# Patient Record
Sex: MALE | Race: WHITE | NOT HISPANIC OR LATINO | Employment: UNEMPLOYED | URBAN - NONMETROPOLITAN AREA
[De-identification: names, ages, dates, MRNs, and addresses within clinical notes are randomized per-mention and may not be internally consistent; named-entity substitution may affect disease eponyms.]

---

## 2020-10-01 ENCOUNTER — TRANSCRIBE ORDERS (OUTPATIENT)
Dept: LAB | Facility: HOSPITAL | Age: 50
End: 2020-10-01

## 2020-10-01 ENCOUNTER — HOSPITAL ENCOUNTER (OUTPATIENT)
Dept: GENERAL RADIOLOGY | Facility: HOSPITAL | Age: 50
Discharge: HOME | End: 2020-10-01
Admitting: FAMILY MEDICINE

## 2020-10-01 DIAGNOSIS — M1A.00X0 IDIOPATHIC CHRONIC GOUT WITHOUT TOPHUS, UNSPECIFIED SITE: ICD-10-CM

## 2020-10-01 DIAGNOSIS — M1A.9XX0 CHRONIC GOUT WITHOUT TOPHUS, UNSPECIFIED CAUSE, UNSPECIFIED SITE: ICD-10-CM

## 2020-10-01 DIAGNOSIS — M25.562 LEFT KNEE PAIN, UNSPECIFIED CHRONICITY: Primary | ICD-10-CM

## 2020-10-01 DIAGNOSIS — M25.562 LEFT KNEE PAIN, UNSPECIFIED CHRONICITY: ICD-10-CM

## 2020-10-01 PROCEDURE — 73562 X-RAY EXAM OF KNEE 3: CPT | Performed by: RADIOLOGY

## 2020-10-01 PROCEDURE — 73562 X-RAY EXAM OF KNEE 3: CPT

## 2022-10-21 ENCOUNTER — HOSPITAL ENCOUNTER (OUTPATIENT)
Dept: GENERAL RADIOLOGY | Facility: HOSPITAL | Age: 52
Discharge: HOME | End: 2022-10-21
Admitting: NURSE PRACTITIONER

## 2022-10-21 DIAGNOSIS — M25.511 RIGHT SHOULDER PAIN: ICD-10-CM

## 2022-10-21 PROCEDURE — 73030 X-RAY EXAM OF SHOULDER: CPT

## 2022-10-21 PROCEDURE — 73030 X-RAY EXAM OF SHOULDER: CPT | Performed by: RADIOLOGY

## 2023-09-05 ENCOUNTER — APPOINTMENT (OUTPATIENT)
Dept: CT IMAGING | Facility: HOSPITAL | Age: 53
DRG: 439 | End: 2023-09-05
Payer: COMMERCIAL

## 2023-09-05 ENCOUNTER — APPOINTMENT (OUTPATIENT)
Dept: ULTRASOUND IMAGING | Facility: HOSPITAL | Age: 53
DRG: 439 | End: 2023-09-05
Payer: COMMERCIAL

## 2023-09-05 ENCOUNTER — HOSPITAL ENCOUNTER (INPATIENT)
Facility: HOSPITAL | Age: 53
LOS: 3 days | Discharge: HOME | DRG: 439 | End: 2023-09-08
Attending: STUDENT IN AN ORGANIZED HEALTH CARE EDUCATION/TRAINING PROGRAM | Admitting: STUDENT IN AN ORGANIZED HEALTH CARE EDUCATION/TRAINING PROGRAM
Payer: COMMERCIAL

## 2023-09-05 ENCOUNTER — APPOINTMENT (OUTPATIENT)
Dept: MRI IMAGING | Facility: HOSPITAL | Age: 53
DRG: 439 | End: 2023-09-05
Payer: COMMERCIAL

## 2023-09-05 DIAGNOSIS — E78.1 HYPERTRIGLYCERIDEMIA: ICD-10-CM

## 2023-09-05 DIAGNOSIS — K85.00 IDIOPATHIC ACUTE PANCREATITIS WITHOUT INFECTION OR NECROSIS: Primary | ICD-10-CM

## 2023-09-05 PROBLEM — K85.90 PANCREATITIS: Status: ACTIVE | Noted: 2023-09-05

## 2023-09-05 LAB
ALBUMIN SERPL-MCNC: 0.4 G/DL (ref 3.5–5.2)
ALBUMIN/GLOB SERPL: 0.1 G/DL
ALP SERPL-CCNC: 75 U/L (ref 39–117)
ALT SERPL W P-5'-P-CCNC: 16 U/L (ref 1–41)
ANION GAP SERPL CALCULATED.3IONS-SCNC: 14.5 MMOL/L (ref 5–15)
ANION GAP SERPL CALCULATED.3IONS-SCNC: 20 MMOL/L (ref 5–15)
AST SERPL-CCNC: 28 U/L (ref 1–40)
BASOPHILS # BLD AUTO: 0.06 10*3/MM3 (ref 0–0.2)
BASOPHILS NFR BLD AUTO: 0.5 % (ref 0–1.5)
BILIRUB SERPL-MCNC: 0.2 MG/DL (ref 0–1.2)
BILIRUB UR QL STRIP: NEGATIVE
BUN SERPL-MCNC: 13 MG/DL (ref 6–20)
BUN SERPL-MCNC: 16 MG/DL (ref 6–20)
BUN/CREAT SERPL: 13.8 (ref 7–25)
BUN/CREAT SERPL: 20.3 (ref 7–25)
CALCIUM SPEC-SCNC: 9.3 MG/DL (ref 8.6–10.5)
CALCIUM SPEC-SCNC: ABNORMAL MMOL/L
CHLORIDE SERPL-SCNC: 100 MMOL/L (ref 98–107)
CHLORIDE SERPL-SCNC: 94 MMOL/L (ref 98–107)
CLARITY UR: CLEAR
CO2 SERPL-SCNC: 12 MMOL/L (ref 22–29)
CO2 SERPL-SCNC: 17.5 MMOL/L (ref 22–29)
COLOR UR: YELLOW
CREAT SERPL-MCNC: 0.64 MG/DL (ref 0.76–1.27)
CREAT SERPL-MCNC: 1.16 MG/DL (ref 0.76–1.27)
D-LACTATE SERPL-SCNC: 1.5 MMOL/L (ref 0.5–2)
DEPRECATED RDW RBC AUTO: 41.3 FL (ref 37–54)
EGFRCR SERPLBLD CKD-EPI 2021: 112.5 ML/MIN/1.73
EGFRCR SERPLBLD CKD-EPI 2021: 74.8 ML/MIN/1.73
EOSINOPHIL # BLD AUTO: 0.26 10*3/MM3 (ref 0–0.4)
EOSINOPHIL NFR BLD AUTO: 2.2 % (ref 0.3–6.2)
ERYTHROCYTE [DISTWIDTH] IN BLOOD BY AUTOMATED COUNT: 12.2 % (ref 12.3–15.4)
GLOBULIN UR ELPH-MCNC: 5.9 GM/DL
GLUCOSE BLDC GLUCOMTR-MCNC: 135 MG/DL (ref 70–130)
GLUCOSE BLDC GLUCOMTR-MCNC: 138 MG/DL (ref 70–130)
GLUCOSE BLDC GLUCOMTR-MCNC: 146 MG/DL (ref 70–130)
GLUCOSE BLDC GLUCOMTR-MCNC: 150 MG/DL (ref 70–130)
GLUCOSE BLDC GLUCOMTR-MCNC: 151 MG/DL (ref 70–130)
GLUCOSE BLDC GLUCOMTR-MCNC: 158 MG/DL (ref 70–130)
GLUCOSE SERPL-MCNC: 149 MG/DL (ref 65–99)
GLUCOSE SERPL-MCNC: 238 MG/DL (ref 65–99)
GLUCOSE UR STRIP-MCNC: ABNORMAL MG/DL
HCT VFR BLD AUTO: 47.2 % (ref 37.5–51)
HGB BLD-MCNC: 19.4 G/DL (ref 13–17.7)
HGB UR QL STRIP.AUTO: NEGATIVE
HOLD SPECIMEN: NORMAL
HOLD SPECIMEN: NORMAL
IMM GRANULOCYTES # BLD AUTO: 0.1 10*3/MM3 (ref 0–0.05)
IMM GRANULOCYTES NFR BLD AUTO: 0.9 % (ref 0–0.5)
KETONES UR QL STRIP: ABNORMAL
LEUKOCYTE ESTERASE UR QL STRIP.AUTO: NEGATIVE
LIPASE SERPL-CCNC: >3000 U/L (ref 13–60)
LYMPHOCYTES # BLD AUTO: 1.33 10*3/MM3 (ref 0.7–3.1)
LYMPHOCYTES NFR BLD AUTO: 11.4 % (ref 19.6–45.3)
MAGNESIUM SERPL-MCNC: 2.4 MG/DL (ref 1.6–2.6)
MCH RBC QN AUTO: 37.7 PG (ref 26.6–33)
MCHC RBC AUTO-ENTMCNC: 36.9 G/DL (ref 31.5–35.7)
MCV RBC AUTO: 91.7 FL (ref 79–97)
MONOCYTES # BLD AUTO: 0.88 10*3/MM3 (ref 0.1–0.9)
MONOCYTES NFR BLD AUTO: 7.6 % (ref 5–12)
NEUTROPHILS NFR BLD AUTO: 77.4 % (ref 42.7–76)
NEUTROPHILS NFR BLD AUTO: 8.99 10*3/MM3 (ref 1.7–7)
NITRITE UR QL STRIP: NEGATIVE
NRBC BLD AUTO-RTO: 0 /100 WBC (ref 0–0.2)
PH UR STRIP.AUTO: <=5 [PH] (ref 5–8)
PHOSPHATE SERPL-MCNC: 2.6 MG/DL (ref 2.5–4.5)
PLATELET # BLD AUTO: 306 10*3/MM3 (ref 140–450)
PMV BLD AUTO: 10.6 FL (ref 6–12)
POTASSIUM SERPL-SCNC: 4.6 MMOL/L (ref 3.5–5.2)
POTASSIUM SERPL-SCNC: 5.8 MMOL/L (ref 3.5–5.2)
PROT SERPL-MCNC: 6.3 G/DL (ref 6–8.5)
PROT UR QL STRIP: ABNORMAL
RBC # BLD AUTO: 5.15 10*6/MM3 (ref 4.14–5.8)
SODIUM SERPL-SCNC: 126 MMOL/L (ref 136–145)
SODIUM SERPL-SCNC: 132 MMOL/L (ref 136–145)
SP GR UR STRIP: >1.03 (ref 1–1.03)
TRIGL SERPL-MCNC: >4425 MG/DL (ref 0–150)
UROBILINOGEN UR QL STRIP: ABNORMAL
WBC NRBC COR # BLD: 11.62 10*3/MM3 (ref 3.4–10.8)
WHOLE BLOOD HOLD COAG: NORMAL
WHOLE BLOOD HOLD SPECIMEN: NORMAL

## 2023-09-05 PROCEDURE — 25010000002 ENOXAPARIN PER 10 MG: Performed by: STUDENT IN AN ORGANIZED HEALTH CARE EDUCATION/TRAINING PROGRAM

## 2023-09-05 PROCEDURE — 74181 MRI ABDOMEN W/O CONTRAST: CPT | Performed by: RADIOLOGY

## 2023-09-05 PROCEDURE — 25010000002 ONDANSETRON PER 1 MG: Performed by: STUDENT IN AN ORGANIZED HEALTH CARE EDUCATION/TRAINING PROGRAM

## 2023-09-05 PROCEDURE — 25010000002 HYDROMORPHONE 1 MG/ML SOLUTION: Performed by: STUDENT IN AN ORGANIZED HEALTH CARE EDUCATION/TRAINING PROGRAM

## 2023-09-05 PROCEDURE — 82948 REAGENT STRIP/BLOOD GLUCOSE: CPT

## 2023-09-05 PROCEDURE — 76705 ECHO EXAM OF ABDOMEN: CPT

## 2023-09-05 PROCEDURE — 25010000002 PROCHLORPERAZINE 10 MG/2ML SOLUTION: Performed by: STUDENT IN AN ORGANIZED HEALTH CARE EDUCATION/TRAINING PROGRAM

## 2023-09-05 PROCEDURE — 76705 ECHO EXAM OF ABDOMEN: CPT | Performed by: RADIOLOGY

## 2023-09-05 PROCEDURE — 25510000001 IOPAMIDOL 61 % SOLUTION: Performed by: STUDENT IN AN ORGANIZED HEALTH CARE EDUCATION/TRAINING PROGRAM

## 2023-09-05 PROCEDURE — 85025 COMPLETE CBC W/AUTO DIFF WBC: CPT | Performed by: PHYSICIAN ASSISTANT

## 2023-09-05 PROCEDURE — 84100 ASSAY OF PHOSPHORUS: CPT | Performed by: STUDENT IN AN ORGANIZED HEALTH CARE EDUCATION/TRAINING PROGRAM

## 2023-09-05 PROCEDURE — 74181 MRI ABDOMEN W/O CONTRAST: CPT

## 2023-09-05 PROCEDURE — 83605 ASSAY OF LACTIC ACID: CPT | Performed by: PHYSICIAN ASSISTANT

## 2023-09-05 PROCEDURE — 74177 CT ABD & PELVIS W/CONTRAST: CPT

## 2023-09-05 PROCEDURE — 84478 ASSAY OF TRIGLYCERIDES: CPT | Performed by: STUDENT IN AN ORGANIZED HEALTH CARE EDUCATION/TRAINING PROGRAM

## 2023-09-05 PROCEDURE — 93005 ELECTROCARDIOGRAM TRACING: CPT | Performed by: STUDENT IN AN ORGANIZED HEALTH CARE EDUCATION/TRAINING PROGRAM

## 2023-09-05 PROCEDURE — 99285 EMERGENCY DEPT VISIT HI MDM: CPT

## 2023-09-05 PROCEDURE — 83690 ASSAY OF LIPASE: CPT | Performed by: PHYSICIAN ASSISTANT

## 2023-09-05 PROCEDURE — 80053 COMPREHEN METABOLIC PANEL: CPT | Performed by: PHYSICIAN ASSISTANT

## 2023-09-05 PROCEDURE — 83735 ASSAY OF MAGNESIUM: CPT | Performed by: STUDENT IN AN ORGANIZED HEALTH CARE EDUCATION/TRAINING PROGRAM

## 2023-09-05 PROCEDURE — 74177 CT ABD & PELVIS W/CONTRAST: CPT | Performed by: RADIOLOGY

## 2023-09-05 PROCEDURE — 81003 URINALYSIS AUTO W/O SCOPE: CPT | Performed by: PHYSICIAN ASSISTANT

## 2023-09-05 RX ORDER — GLIPIZIDE 10 MG/1
10 TABLET, FILM COATED, EXTENDED RELEASE ORAL DAILY
COMMUNITY

## 2023-09-05 RX ORDER — ATORVASTATIN CALCIUM 40 MG/1
40 TABLET, FILM COATED ORAL NIGHTLY
Status: CANCELLED | OUTPATIENT
Start: 2023-09-05

## 2023-09-05 RX ORDER — DAPAGLIFLOZIN 10 MG/1
10 TABLET, FILM COATED ORAL DAILY
Status: ON HOLD | COMMUNITY
End: 2023-09-05

## 2023-09-05 RX ORDER — ASPIRIN 81 MG/1
TABLET ORAL
COMMUNITY

## 2023-09-05 RX ORDER — PROCHLORPERAZINE EDISYLATE 5 MG/ML
10 INJECTION INTRAMUSCULAR; INTRAVENOUS ONCE
Status: COMPLETED | OUTPATIENT
Start: 2023-09-05 | End: 2023-09-05

## 2023-09-05 RX ORDER — DEXTROSE MONOHYDRATE 25 G/50ML
25 INJECTION, SOLUTION INTRAVENOUS
Status: DISCONTINUED | OUTPATIENT
Start: 2023-09-05 | End: 2023-09-08

## 2023-09-05 RX ORDER — SODIUM CHLORIDE 0.9 % (FLUSH) 0.9 %
10 SYRINGE (ML) INJECTION AS NEEDED
Status: DISCONTINUED | OUTPATIENT
Start: 2023-09-05 | End: 2023-09-08 | Stop reason: HOSPADM

## 2023-09-05 RX ORDER — DEXTROSE AND SODIUM CHLORIDE 5; .45 G/100ML; G/100ML
150 INJECTION, SOLUTION INTRAVENOUS CONTINUOUS
Status: DISCONTINUED | OUTPATIENT
Start: 2023-09-05 | End: 2023-09-08

## 2023-09-05 RX ORDER — NICOTINE POLACRILEX 4 MG
15 LOZENGE BUCCAL
Status: DISCONTINUED | OUTPATIENT
Start: 2023-09-05 | End: 2023-09-08

## 2023-09-05 RX ORDER — GLUCAGON 1 MG/ML
1 KIT INJECTION
Status: DISCONTINUED | OUTPATIENT
Start: 2023-09-05 | End: 2023-09-08

## 2023-09-05 RX ORDER — HYDROXYZINE 50 MG/1
50 TABLET, FILM COATED ORAL NIGHTLY
Status: CANCELLED | OUTPATIENT
Start: 2023-09-05

## 2023-09-05 RX ORDER — ASPIRIN 81 MG/1
81 TABLET ORAL DAILY
Status: CANCELLED | OUTPATIENT
Start: 2023-09-06

## 2023-09-05 RX ORDER — GLIPIZIDE 5 MG/1
5 TABLET ORAL
Status: CANCELLED | OUTPATIENT
Start: 2023-09-06

## 2023-09-05 RX ORDER — FENOFIBRATE 145 MG/1
145 TABLET, COATED ORAL NIGHTLY
COMMUNITY

## 2023-09-05 RX ORDER — ENOXAPARIN SODIUM 100 MG/ML
40 INJECTION SUBCUTANEOUS NIGHTLY
Status: DISCONTINUED | OUTPATIENT
Start: 2023-09-05 | End: 2023-09-08 | Stop reason: HOSPADM

## 2023-09-05 RX ORDER — FENOFIBRATE 145 MG/1
145 TABLET, COATED ORAL NIGHTLY
Status: CANCELLED | OUTPATIENT
Start: 2023-09-05

## 2023-09-05 RX ORDER — HYDROXYZINE PAMOATE 25 MG/1
50 CAPSULE ORAL NIGHTLY
COMMUNITY

## 2023-09-05 RX ORDER — PROCHLORPERAZINE EDISYLATE 5 MG/ML
5 INJECTION INTRAMUSCULAR; INTRAVENOUS EVERY 6 HOURS PRN
Status: DISCONTINUED | OUTPATIENT
Start: 2023-09-05 | End: 2023-09-08 | Stop reason: HOSPADM

## 2023-09-05 RX ORDER — HYDROMORPHONE HYDROCHLORIDE 1 MG/ML
0.5 INJECTION, SOLUTION INTRAMUSCULAR; INTRAVENOUS; SUBCUTANEOUS
Status: DISCONTINUED | OUTPATIENT
Start: 2023-09-05 | End: 2023-09-08 | Stop reason: HOSPADM

## 2023-09-05 RX ORDER — FLUOXETINE HYDROCHLORIDE 20 MG/1
20 CAPSULE ORAL DAILY
Status: CANCELLED | OUTPATIENT
Start: 2023-09-06

## 2023-09-05 RX ORDER — ATORVASTATIN CALCIUM 40 MG/1
40 TABLET, FILM COATED ORAL NIGHTLY
COMMUNITY

## 2023-09-05 RX ORDER — SODIUM CHLORIDE 9 MG/ML
40 INJECTION, SOLUTION INTRAVENOUS AS NEEDED
Status: DISCONTINUED | OUTPATIENT
Start: 2023-09-05 | End: 2023-09-08 | Stop reason: HOSPADM

## 2023-09-05 RX ORDER — ONDANSETRON 2 MG/ML
4 INJECTION INTRAMUSCULAR; INTRAVENOUS ONCE
Status: COMPLETED | OUTPATIENT
Start: 2023-09-05 | End: 2023-09-05

## 2023-09-05 RX ORDER — SODIUM CHLORIDE 0.9 % (FLUSH) 0.9 %
10 SYRINGE (ML) INJECTION EVERY 12 HOURS SCHEDULED
Status: DISCONTINUED | OUTPATIENT
Start: 2023-09-05 | End: 2023-09-08 | Stop reason: HOSPADM

## 2023-09-05 RX ORDER — FLUOXETINE HYDROCHLORIDE 20 MG/1
20 CAPSULE ORAL DAILY
COMMUNITY

## 2023-09-05 RX ADMIN — HYDROMORPHONE HYDROCHLORIDE 1 MG: 1 INJECTION, SOLUTION INTRAMUSCULAR; INTRAVENOUS; SUBCUTANEOUS at 15:12

## 2023-09-05 RX ADMIN — HYDROMORPHONE HYDROCHLORIDE 1 MG: 1 INJECTION, SOLUTION INTRAMUSCULAR; INTRAVENOUS; SUBCUTANEOUS at 18:36

## 2023-09-05 RX ADMIN — INSULIN HUMAN 0.05 UNITS/KG/HR: 1 INJECTION, SOLUTION INTRAVENOUS at 21:42

## 2023-09-05 RX ADMIN — ONDANSETRON 4 MG: 2 INJECTION INTRAMUSCULAR; INTRAVENOUS at 14:02

## 2023-09-05 RX ADMIN — DEXTROSE AND SODIUM CHLORIDE 150 ML/HR: 5; 450 INJECTION, SOLUTION INTRAVENOUS at 20:46

## 2023-09-05 RX ADMIN — IOPAMIDOL 86 ML: 612 INJECTION, SOLUTION INTRAVENOUS at 15:30

## 2023-09-05 RX ADMIN — ENOXAPARIN SODIUM 40 MG: 40 INJECTION SUBCUTANEOUS at 20:50

## 2023-09-05 RX ADMIN — Medication 10 ML: at 20:51

## 2023-09-05 RX ADMIN — SODIUM CHLORIDE 1000 ML: 9 INJECTION, SOLUTION INTRAVENOUS at 13:41

## 2023-09-05 RX ADMIN — PROCHLORPERAZINE EDISYLATE 10 MG: 5 INJECTION INTRAMUSCULAR; INTRAVENOUS at 17:40

## 2023-09-05 RX ADMIN — SODIUM CHLORIDE 1000 ML: 9 INJECTION, SOLUTION INTRAVENOUS at 15:06

## 2023-09-05 RX ADMIN — HYDROMORPHONE HYDROCHLORIDE 1 MG: 1 INJECTION, SOLUTION INTRAMUSCULAR; INTRAVENOUS; SUBCUTANEOUS at 17:40

## 2023-09-05 RX ADMIN — HYDROMORPHONE HYDROCHLORIDE 1 MG: 1 INJECTION, SOLUTION INTRAMUSCULAR; INTRAVENOUS; SUBCUTANEOUS at 14:02

## 2023-09-05 RX ADMIN — PROCHLORPERAZINE EDISYLATE 5 MG: 5 INJECTION INTRAMUSCULAR; INTRAVENOUS at 22:37

## 2023-09-05 NOTE — LETTER
September 8, 2023     Patient: Herminio Matson   YOB: 1970   Date of Visit: 9/5/2023       To Whom It May Concern:    It is my medical opinion that Herminio Matson should be excused from work 9/5/2023 through 9/10/2023 due to recent hospitalization.            Sincerely,        Dr. Deangelo Thomason, DO

## 2023-09-05 NOTE — ED PROVIDER NOTES
Subjective   History of Present Illness  This is a 52-year-old male who presents to the emergency department chief complaint epigastric abdominal pain that radiates to his back.  Patient has had 1 episode of associated nausea and vomiting.  Patient has history of pancreatitis from hypertriglyceridemia.    History provided by:  Patient   used: No    Abdominal Pain  Pain location:  Generalized  Pain quality: aching    Pain radiates to:  Does not radiate  Pain severity:  Moderate  Onset quality:  Gradual  Duration:  2 days  Timing:  Intermittent  Progression:  Worsening  Chronicity:  New  Context: not alcohol use, not awakening from sleep, not diet changes, not laxative use, not previous surgeries, not recent illness, not recent sexual activity, not retching, not sick contacts and not suspicious food intake    Relieved by:  Nothing  Worsened by:  Nothing  Ineffective treatments:  None tried  Associated symptoms: chills, fatigue and nausea    Associated symptoms: no cough, no shortness of breath, no vaginal bleeding, no vaginal discharge and no vomiting    Risk factors: no alcohol abuse, no aspirin use, has not had multiple surgeries, no NSAID use, not pregnant and no recent hospitalization      Review of Systems   Constitutional:  Positive for chills and fatigue.   Eyes: Negative.  Negative for photophobia, pain, redness and itching.   Respiratory:  Negative for cough, choking, shortness of breath and stridor.    Gastrointestinal:  Positive for abdominal pain and nausea. Negative for vomiting.   Endocrine: Negative.    Genitourinary: Negative.  Negative for enuresis, flank pain, frequency, penile discharge, penile pain, vaginal bleeding and vaginal discharge.   All other systems reviewed and are negative.    Past Medical History:   Diagnosis Date    Anxiety     Hypertension     Pancreatitis        No Known Allergies    History reviewed. No pertinent surgical history.    History reviewed. No  pertinent family history.    Social History     Socioeconomic History    Marital status:            Objective   Physical Exam  Vitals and nursing note reviewed.   Constitutional:       General: He is not in acute distress.     Appearance: Normal appearance. He is well-developed and normal weight. He is not ill-appearing or toxic-appearing.   HENT:      Head: Normocephalic and atraumatic.      Nose: Nose normal.      Mouth/Throat:      Mouth: Mucous membranes are moist.      Pharynx: Oropharynx is clear. No pharyngeal swelling.   Eyes:      General: No scleral icterus.     Extraocular Movements: Extraocular movements intact.      Conjunctiva/sclera: Conjunctivae normal.      Pupils: Pupils are equal, round, and reactive to light.   Cardiovascular:      Rate and Rhythm: Normal rate and regular rhythm.      Heart sounds: Normal heart sounds. No murmur heard.    No friction rub. No gallop.   Pulmonary:      Effort: Pulmonary effort is normal. No respiratory distress.      Breath sounds: Normal breath sounds. No stridor. No wheezing, rhonchi or rales.   Abdominal:      General: Abdomen is flat and protuberant. Bowel sounds are normal.      Palpations: Abdomen is soft. There is no shifting dullness, fluid wave, hepatomegaly, splenomegaly, mass or pulsatile mass.      Tenderness: There is abdominal tenderness in the epigastric area. There is guarding.      Hernia: There is no hernia in the umbilical area, ventral area, left inguinal area, right femoral area or right inguinal area.   Musculoskeletal:         General: Normal range of motion.      Cervical back: Normal range of motion and neck supple.   Skin:     General: Skin is warm and dry.      Capillary Refill: Capillary refill takes less than 2 seconds.      Coloration: Skin is not cyanotic, jaundiced, mottled or pale.      Findings: No erythema.   Neurological:      General: No focal deficit present.      Mental Status: He is alert and oriented to person, place,  and time.   Psychiatric:         Mood and Affect: Mood normal. Mood is not anxious or depressed.         Behavior: Behavior normal.         Thought Content: Thought content normal.         Judgment: Judgment normal.       Procedures           ED Course  ED Course as of 09/05/23 1910   Tue Sep 05, 2023   1556 IMPRESSION:  1.  Stranding around the pancreas suggestive of acute pancreatitis.  2.  Tiny calcific density measuring about 2 mm in region of head of  pancreas could represent distal duct stone and evaluation with  ultrasound or MRCP could be considered for further evaluation.  3.  Degenerative changes lumbar spine as described.   []   1706 Discussed care with Dr. Zaldivar.  Await MRCP. []      ED Course User Index  [] Chun Fernandez PA-C                                           Medical Decision Making  Problems Addressed:  Idiopathic acute pancreatitis without infection or necrosis: complicated acute illness or injury    Amount and/or Complexity of Data Reviewed  Labs: ordered.  Radiology: ordered.    Risk  Prescription drug management.  Decision regarding hospitalization.        Final diagnoses:   Idiopathic acute pancreatitis without infection or necrosis   Hypertriglyceridemia       ED Disposition  ED Disposition       ED Disposition   Decision to Admit    Condition   --    Comment   Level of Care: Progressive Care [20]   Diagnosis: Pancreatitis [202663]   Certification: I Certify That Inpatient Hospital Services Are Medically Necessary For Greater Than 2 Midnights                 No follow-up provider specified.       Medication List      No changes were made to your prescriptions during this visit.            Chun Fernandez PA-C  09/05/23 1910

## 2023-09-05 NOTE — H&P
[facility] ERIST HISTORY AND PHYSICAL    Patient Identification:  Name:  Herminio Matson  Age:  52 y.o.  Sex:  male  :  1970  MRN:  [id number]   Visit Number:  [id number]  Admit Date: 2023   Room number:  H101/H1  Primary Care Physician:  Zain      Subjective     Chief complaint:    Chief Complaint   Patient presents with    Abdominal Pain       History of presenting illness:  52 y.o. male who presents to the hospital with onset of nausea and vomiting with epigastric pain early this morning.  Patient has a past medical history significant for hypertriglyceridemia with prior episode of pancreatitis on fibrate and statin, hypertension, and diabetes mellitus type 2.  Patient states that symptoms began earlier this morning.  He denies any fevers or chills associated with this.  He denies any chest pain, shortness of breath.  Patient reports inability to keep down oral food today hence he came to the hospital for further evaluation.  Patient does report past history of prior hospitalization for pancreatitis secondary to hypertriglyceridemia approximately 3 years ago in [place].  When asked if he is missed or run out of any of his cholesterol/lipid-lowering medications patient does report that up until last week when he followed up with his PCP he had been without his fibrate for several weeks.  When asked if repeat lab work was obtained at his last PCP visit he does report that his triglycerides reported to be in the 6000's he thinks and his wife was unaware of this.  Patient's triglycerides today in the emergency room elevated at 4400.  Patient states that he feels a little bit drowsy/sluggish but otherwise denies any other acute complaints.  No diarrhea or constipation no dysuria.  ---------------------------------------------------------------------------------------------------------------------   Review of Systems a 14 system review of systems was performed  with pertinent positives and negatives as above in the HPI.  ---------------------------------------------------------------------------------------------------------------------   Past Medical History:   Diagnosis Date    Anxiety     Hypertension     Pancreatitis      History reviewed. No pertinent surgical history.  History reviewed. No pertinent family history.  Social History     Socioeconomic History    Marital status:      ---------------------------------------------------------------------------------------------------------------------   Allergies:  Patient has no known allergies.  ---------------------------------------------------------------------------------------------------------------------   Medications below are reported home medications pulling from within the system; at this time, these medications have not been reconciled unless otherwise specified and are in the verification process for further verifcation as current home medications.    Prior to Admission Medications       Prescriptions Last Dose Informant Patient Reported? Taking?    FLUoxetine (PROzac) 10 MG capsule   Yes No    Take 1 capsule by mouth Daily.    hydrOXYzine pamoate (VISTARIL) 25 MG capsule   Yes No    Take 1 capsule by mouth 3 (Three) Times a Day As Needed for Itching.    metoprolol tartrate (LOPRESSOR) 50 MG tablet   Yes No    Take 1 tablet by mouth 2 (Two) Times a Day.          Objective     Vital Signs:  Temp:  [98.1 °F (36.7 °C)-98.7 °F (37.1 °C)] 98.7 °F (37.1 °C)  Heart Rate:  [67-92] 92  Resp:  [18-20] 20  BP: (129-153)/(76-87) 129/77    No data found.  SpO2:  [94 %-98 %] 94 %  on   ;   Device (Oxygen Therapy): room air  Body mass index is 28.48 kg/m².    Wt Readings from Last 3 Encounters:   09/05/23 95.3 kg (210 lb)      ---------------------------------------------------------------------------------------------------------------------   Physical Exam:  Constitutional:  Well-developed and well-nourished.  No  respiratory distress.      HENT:  Head: Normocephalic and atraumatic.  Mouth:  Moist mucous membranes.    Eyes:  Conjunctivae and EOM are normal.  Pupils are equal, round, and reactive to light.  No scleral icterus.  Neck:  Neck supple.  No JVD present.    Cardiovascular:  Normal rate, regular rhythm and normal heart sounds with no murmur.  Pulmonary/Chest:  No respiratory distress, no wheezes, no crackles, with normal breath sounds and good air movement.  Abdominal:  Soft.  Bowel sounds are normal.  No distention but there is tenderness to palpation in the epigastric region..   Musculoskeletal:  No tenderness and no deformity.  No red or swollen joints anywhere.    Neurological: Slightly drowsy but oriented to person, place, and time.  No cranial nerve deficit.  No tongue deviation.  No facial droop.  No slurred speech.   Skin:  Skin is warm and dry.  No rash noted.  No pallor.   Peripheral vascular:  No edema and pulses on all 4 extremities.    ---------------------------------------------------------------------------------------------------------------------  EKG:   Ordered and pending at this time    --------------------------------------------------------------------------------------------------------------------  Labs:  Results from last 7 days   Lab Units 09/05/23  1347   LACTATE mmol/L 1.5   WBC 10*3/mm3 11.62*   HEMOGLOBIN g/dL 19.4*   HEMATOCRIT % 47.2   MCV fL 91.7   MCHC g/dL 36.9*   PLATELETS 10*3/mm3 306         Results from last 7 days   Lab Units 09/05/23  1347   SODIUM mmol/L 126*   POTASSIUM mmol/L 5.8*   CHLORIDE mmol/L 94*   CO2 mmol/L 17.5*   BUN mg/dL 16   CREATININE mg/dL 1.16   GLUCOSE mg/dL 238*   ALBUMIN g/dL 0.4*   BILIRUBIN mg/dL 0.2   ALK PHOS U/L 75   AST (SGOT) U/L 28   ALT (SGPT) U/L 16   Estimated Creatinine Clearance: 87.2 mL/min (by C-G formula based on SCr of 1.16 mg/dL).    No results found for: AMMONIA      Results from last 7 days   Lab Units 09/05/23  1347   TRIGLYCERIDES  mg/dL >4,425*     No results found for: HGBA1C, POCGLU  No results found for: TSH, FREET4  No results found for: PREGTESTUR, PREGSERUM, HCG, HCGQUANT  Pain Management Panel           No data to display              Brief Urine Lab Results  (Last result in the past 365 days)        Color   Clarity   Blood   Leuk Est   Nitrite   Protein   CREAT   Urine HCG        09/05/23 1347 Yellow   Clear   Negative   Negative   Negative   Trace                 No results found for: BLOODCX  No results found for: URINECX  No results found for: WOUNDCX  No results found for: STOOLCX    I have personally looked at the labs and they are summarized above.  ----------------------------------------------------------------------------------------------------------------------  Detailed radiology reports for the last 24 hours:    Imaging Results (Last 24 Hours)       Procedure Component Value Units Date/Time    MRI abdomen wo contrast mrcp [[id number]] Resulted: 09/05/23 1639     Updated: 09/05/23 1704    US Gallbladder [[id number]] Collected: 09/05/23 1629     Updated: 09/05/23 1631    Narrative:      EXAM:    US Abdomen Limited, Gallbladder     EXAM DATE:    9/5/2023 4:16 PM     CLINICAL HISTORY:    ruq abdominal pain     TECHNIQUE:    Real-time ultrasound of the right upper quadrant with image  documentation.     COMPARISON:    No relevant prior studies available.     FINDINGS:    Liver:  Fatty infiltration of the liver.    Gallbladder:  Unremarkable.  No gallstones.    Common bile duct:  Unremarkable as visualized.  No stones.  No  dilation.  Common bile duct measures 0.22 cm in diameter.    Pancreas:  Unremarkable as visualized.       Impression:        No acute findings in the right upper quadrant.     This report was finalized on 9/5/2023 4:29 PM by Dr. Perry Roberts MD.       CT Abdomen Pelvis With Contrast [[id number]] Collected: 09/05/23 1547     Updated: 09/05/23 1550    Narrative:      EXAM:    CT Abdomen and Pelvis With  Intravenous Contrast     EXAM DATE:    9/5/2023 3:13 PM     CLINICAL HISTORY:    abdominal pain     TECHNIQUE:    Axial computed tomography images of the abdomen and pelvis with  intravenous contrast.  Sagittal and coronal reformatted images were  created and reviewed.  This CT exam was performed using one or more of  the following dose reduction techniques:  automated exposure control,  adjustment of the mA and/or kV according to patient size, and/or use of  iterative reconstruction technique.     COMPARISON:    No relevant prior studies available.     FINDINGS:    LUNG BASES:  Unremarkable.  No mass.  No consolidation.      ABDOMEN:    LIVER:  Unremarkable.  No mass.    GALLBLADDER AND BILE DUCTS:  Unremarkable.  No calcified stones.  No  ductal dilation.    PANCREAS:  Stranding around the pancreas suggestive of acute  pancreatitis.  Tiny calcific density measuring about 2 mm in region of  head of pancreas could represent distal duct stone and evaluation with  ultrasound or MRCP could be considered for further evaluation.    SPLEEN:  Unremarkable.  No splenomegaly.    ADRENALS:  Unremarkable.  No mass.    KIDNEYS AND URETERS:  Unremarkable.  No solid mass.  No  hydronephrosis.    STOMACH AND BOWEL:  Unremarkable.  No obstruction.  No mucosal  thickening.      PELVIS:    APPENDIX:  No findings to suggest acute appendicitis.    BLADDER:  Unremarkable.  No mass.    REPRODUCTIVE:  Unremarkable as visualized.      ABDOMEN and PELVIS:    INTRAPERITONEAL SPACE:  Unremarkable.  No free air.  No significant  fluid collection.    BONES/JOINTS:  Degenerative disc disease throughout the lumbar spine.   Degenerative facet arthropathy throughout the lumbar spine, most  prominent in the lower lumbar spine.  No acute fracture.  No  dislocation.    SOFT TISSUES:  Unremarkable.    VASCULATURE:  Atherosclerotic disease.  No abdominal aortic aneurysm.    LYMPH NODES:  Unremarkable.  No enlarged lymph nodes.       Impression:      1.   Stranding around the pancreas suggestive of acute pancreatitis.  2.  Tiny calcific density measuring about 2 mm in region of head of  pancreas could represent distal duct stone and evaluation with  ultrasound or MRCP could be considered for further evaluation.  3.  Degenerative changes lumbar spine as described.     This report was finalized on 9/5/2023 3:48 PM by Dr. Perry Roberts MD.             Final impressions for the last 30 days of radiology reports:    CT Abdomen Pelvis With Contrast    Result Date: 9/5/2023  1.  Stranding around the pancreas suggestive of acute pancreatitis. 2.  Tiny calcific density measuring about 2 mm in region of head of pancreas could represent distal duct stone and evaluation with ultrasound or MRCP could be considered for further evaluation. 3.  Degenerative changes lumbar spine as described.  This report was finalized on 9/5/2023 3:48 PM by Dr. Perry Roberts MD.      US Gallbladder    Result Date: 9/5/2023    No acute findings in the right upper quadrant.  This report was finalized on 9/5/2023 4:29 PM by Dr. Perry Roberts MD.     I have personally looked at the radiology images and read the final radiology report.    Assessment & Plan       Acute pancreatitis  Severe hypertriglyceridemia  Intractable nausea and vomiting    -Patient presenting with nausea and vomiting associated with acute pancreatitis in the setting of markedly elevated triglycerides greater than 4000.  Patient with a past medical history significant for hypertriglyceridemia and previous induced pancreatitis in the past last episode 3 years prior.    -Patient admits that he has been out of his fibrate until a week ago for approximately 2 to 3 weeks large suspicion that this is a contributing factor.    -Initiate patient on D5 half-normal saline for volume expansion in the setting of pancreatitis as well as need for glucose administration while initiating insulin drip for hypertriglyceridemia.    -Triglycerides every  12 hours until triglycerides less than 500 at which point we will turn off insulin drip.    -We will resume any of patient's home medications as appropriate.    -Lipase greater than 3000 with typical abdominal pain and imaging findings consistent with pancreatitis.    Hyponatremia    -Patient with corrected sodium of 129 not clinically significant at this time, will continue to monitor on chemistry panels.    Diabetes mellitus type 2    -Patient currently on insulin drip for treatment of hypertriglyceridemia as above, will monitor glucose closely and adjust insulin drip as needed.    -Every hour glucose checks while on insulin drip.    Hypertension    -Resume home antihypertensives once reconciled    VTE Prophylaxis:   Mechanical Order History:       None          Pharmalogical Order History:        Ordered     Dose Route Frequency Stop    09/05/23 1842  Enoxaparin Sodium (LOVENOX) syringe 40 mg         40 mg SC Nightly --    09/05/23 1840  Pharmacy to Dose enoxaparin (LOVENOX)        Question:  Indication of use  Answer:  Prophylaxis    -- XX Continuous PRN --                    The patient is high risk due to the following diagnoses/reasons: Hypertrophy acidemia induced acute pancreatitis with intractable nausea and vomiting.        Deangelo Thomason DO  [facility] ERist  09/05/23  18:46 EDT

## 2023-09-06 LAB
ANION GAP SERPL CALCULATED.3IONS-SCNC: 10.1 MMOL/L (ref 5–15)
ANION GAP SERPL CALCULATED.3IONS-SCNC: 11.7 MMOL/L (ref 5–15)
ANION GAP SERPL CALCULATED.3IONS-SCNC: 9.6 MMOL/L (ref 5–15)
BUN SERPL-MCNC: 10 MG/DL (ref 6–20)
BUN SERPL-MCNC: 10 MG/DL (ref 6–20)
BUN SERPL-MCNC: 11 MG/DL (ref 6–20)
BUN/CREAT SERPL: 13.5 (ref 7–25)
BUN/CREAT SERPL: 17.5 (ref 7–25)
BUN/CREAT SERPL: 18.3 (ref 7–25)
CALCIUM SPEC-SCNC: 8.4 MG/DL (ref 8.6–10.5)
CALCIUM SPEC-SCNC: 8.8 MG/DL (ref 8.6–10.5)
CALCIUM SPEC-SCNC: 8.8 MG/DL (ref 8.6–10.5)
CHLORIDE SERPL-SCNC: 102 MMOL/L (ref 98–107)
CHLORIDE SERPL-SCNC: 102 MMOL/L (ref 98–107)
CHLORIDE SERPL-SCNC: 99 MMOL/L (ref 98–107)
CO2 SERPL-SCNC: 17.3 MMOL/L (ref 22–29)
CO2 SERPL-SCNC: 23.4 MMOL/L (ref 22–29)
CO2 SERPL-SCNC: 24.9 MMOL/L (ref 22–29)
CREAT SERPL-MCNC: 0.57 MG/DL (ref 0.76–1.27)
CREAT SERPL-MCNC: 0.6 MG/DL (ref 0.76–1.27)
CREAT SERPL-MCNC: 0.74 MG/DL (ref 0.76–1.27)
DEPRECATED RDW RBC AUTO: 41.2 FL (ref 37–54)
EGFRCR SERPLBLD CKD-EPI 2021: 107.7 ML/MIN/1.73
EGFRCR SERPLBLD CKD-EPI 2021: 114.7 ML/MIN/1.73
EGFRCR SERPLBLD CKD-EPI 2021: 116.5 ML/MIN/1.73
ERYTHROCYTE [DISTWIDTH] IN BLOOD BY AUTOMATED COUNT: 12.6 % (ref 12.3–15.4)
GLUCOSE BLDC GLUCOMTR-MCNC: 123 MG/DL (ref 70–130)
GLUCOSE BLDC GLUCOMTR-MCNC: 137 MG/DL (ref 70–130)
GLUCOSE BLDC GLUCOMTR-MCNC: 138 MG/DL (ref 70–130)
GLUCOSE BLDC GLUCOMTR-MCNC: 139 MG/DL (ref 70–130)
GLUCOSE BLDC GLUCOMTR-MCNC: 140 MG/DL (ref 70–130)
GLUCOSE BLDC GLUCOMTR-MCNC: 141 MG/DL (ref 70–130)
GLUCOSE BLDC GLUCOMTR-MCNC: 143 MG/DL (ref 70–130)
GLUCOSE BLDC GLUCOMTR-MCNC: 147 MG/DL (ref 70–130)
GLUCOSE BLDC GLUCOMTR-MCNC: 147 MG/DL (ref 70–130)
GLUCOSE BLDC GLUCOMTR-MCNC: 148 MG/DL (ref 70–130)
GLUCOSE BLDC GLUCOMTR-MCNC: 151 MG/DL (ref 70–130)
GLUCOSE BLDC GLUCOMTR-MCNC: 152 MG/DL (ref 70–130)
GLUCOSE BLDC GLUCOMTR-MCNC: 154 MG/DL (ref 70–130)
GLUCOSE BLDC GLUCOMTR-MCNC: 158 MG/DL (ref 70–130)
GLUCOSE BLDC GLUCOMTR-MCNC: 160 MG/DL (ref 70–130)
GLUCOSE BLDC GLUCOMTR-MCNC: 177 MG/DL (ref 70–130)
GLUCOSE BLDC GLUCOMTR-MCNC: 181 MG/DL (ref 70–130)
GLUCOSE BLDC GLUCOMTR-MCNC: 186 MG/DL (ref 70–130)
GLUCOSE BLDC GLUCOMTR-MCNC: 187 MG/DL (ref 70–130)
GLUCOSE BLDC GLUCOMTR-MCNC: 197 MG/DL (ref 70–130)
GLUCOSE SERPL-MCNC: 144 MG/DL (ref 65–99)
GLUCOSE SERPL-MCNC: 145 MG/DL (ref 65–99)
GLUCOSE SERPL-MCNC: 176 MG/DL (ref 65–99)
HBA1C MFR BLD: 10.3 % (ref 4.8–5.6)
HCT VFR BLD AUTO: 44.6 % (ref 37.5–51)
HGB BLD-MCNC: 16.1 G/DL (ref 13–17.7)
MAGNESIUM SERPL-MCNC: 2.2 MG/DL (ref 1.6–2.6)
MCH RBC QN AUTO: 32.9 PG (ref 26.6–33)
MCHC RBC AUTO-ENTMCNC: 36.1 G/DL (ref 31.5–35.7)
MCV RBC AUTO: 91.2 FL (ref 79–97)
PHOSPHATE SERPL-MCNC: 3.2 MG/DL (ref 2.5–4.5)
PLATELET # BLD AUTO: 308 10*3/MM3 (ref 140–450)
PMV BLD AUTO: 10.6 FL (ref 6–12)
POTASSIUM SERPL-SCNC: 3.9 MMOL/L (ref 3.5–5.2)
POTASSIUM SERPL-SCNC: 4.1 MMOL/L (ref 3.5–5.2)
POTASSIUM SERPL-SCNC: 5.5 MMOL/L (ref 3.5–5.2)
RBC # BLD AUTO: 4.89 10*6/MM3 (ref 4.14–5.8)
SODIUM SERPL-SCNC: 131 MMOL/L (ref 136–145)
SODIUM SERPL-SCNC: 134 MMOL/L (ref 136–145)
SODIUM SERPL-SCNC: 135 MMOL/L (ref 136–145)
TRIGL SERPL-MCNC: 1463 MG/DL (ref 0–150)
TRIGL SERPL-MCNC: 2566 MG/DL (ref 0–150)
WBC NRBC COR # BLD: 17.47 10*3/MM3 (ref 3.4–10.8)

## 2023-09-06 PROCEDURE — 80048 BASIC METABOLIC PNL TOTAL CA: CPT | Performed by: STUDENT IN AN ORGANIZED HEALTH CARE EDUCATION/TRAINING PROGRAM

## 2023-09-06 PROCEDURE — 25010000002 ONDANSETRON PER 1 MG: Performed by: STUDENT IN AN ORGANIZED HEALTH CARE EDUCATION/TRAINING PROGRAM

## 2023-09-06 PROCEDURE — 25010000002 HYDROMORPHONE PER 4 MG: Performed by: STUDENT IN AN ORGANIZED HEALTH CARE EDUCATION/TRAINING PROGRAM

## 2023-09-06 PROCEDURE — 25010000002 CALCIUM GLUCONATE-NACL 1-0.675 GM/50ML-% SOLUTION: Performed by: INTERNAL MEDICINE

## 2023-09-06 PROCEDURE — 82948 REAGENT STRIP/BLOOD GLUCOSE: CPT

## 2023-09-06 PROCEDURE — 25010000002 FUROSEMIDE PER 20 MG: Performed by: INTERNAL MEDICINE

## 2023-09-06 PROCEDURE — 83735 ASSAY OF MAGNESIUM: CPT | Performed by: STUDENT IN AN ORGANIZED HEALTH CARE EDUCATION/TRAINING PROGRAM

## 2023-09-06 PROCEDURE — 87076 CULTURE ANAEROBE IDENT EACH: CPT | Performed by: STUDENT IN AN ORGANIZED HEALTH CARE EDUCATION/TRAINING PROGRAM

## 2023-09-06 PROCEDURE — 84478 ASSAY OF TRIGLYCERIDES: CPT | Performed by: STUDENT IN AN ORGANIZED HEALTH CARE EDUCATION/TRAINING PROGRAM

## 2023-09-06 PROCEDURE — 84100 ASSAY OF PHOSPHORUS: CPT | Performed by: STUDENT IN AN ORGANIZED HEALTH CARE EDUCATION/TRAINING PROGRAM

## 2023-09-06 PROCEDURE — 25010000002 PROCHLORPERAZINE 10 MG/2ML SOLUTION: Performed by: STUDENT IN AN ORGANIZED HEALTH CARE EDUCATION/TRAINING PROGRAM

## 2023-09-06 PROCEDURE — 85027 COMPLETE CBC AUTOMATED: CPT | Performed by: STUDENT IN AN ORGANIZED HEALTH CARE EDUCATION/TRAINING PROGRAM

## 2023-09-06 PROCEDURE — 93005 ELECTROCARDIOGRAM TRACING: CPT | Performed by: STUDENT IN AN ORGANIZED HEALTH CARE EDUCATION/TRAINING PROGRAM

## 2023-09-06 PROCEDURE — 87040 BLOOD CULTURE FOR BACTERIA: CPT | Performed by: STUDENT IN AN ORGANIZED HEALTH CARE EDUCATION/TRAINING PROGRAM

## 2023-09-06 PROCEDURE — 83036 HEMOGLOBIN GLYCOSYLATED A1C: CPT | Performed by: STUDENT IN AN ORGANIZED HEALTH CARE EDUCATION/TRAINING PROGRAM

## 2023-09-06 PROCEDURE — 25010000002 ENOXAPARIN PER 10 MG: Performed by: STUDENT IN AN ORGANIZED HEALTH CARE EDUCATION/TRAINING PROGRAM

## 2023-09-06 RX ORDER — ONDANSETRON 2 MG/ML
4 INJECTION INTRAMUSCULAR; INTRAVENOUS ONCE
Status: COMPLETED | OUTPATIENT
Start: 2023-09-06 | End: 2023-09-06

## 2023-09-06 RX ORDER — SODIUM CHLORIDE 0.9 % (FLUSH) 0.9 %
10 SYRINGE (ML) INJECTION EVERY 12 HOURS SCHEDULED
Status: DISCONTINUED | OUTPATIENT
Start: 2023-09-06 | End: 2023-09-08 | Stop reason: HOSPADM

## 2023-09-06 RX ORDER — FUROSEMIDE 10 MG/ML
40 INJECTION INTRAMUSCULAR; INTRAVENOUS ONCE
Status: COMPLETED | OUTPATIENT
Start: 2023-09-06 | End: 2023-09-06

## 2023-09-06 RX ORDER — FLUOXETINE HYDROCHLORIDE 20 MG/1
20 CAPSULE ORAL DAILY
Status: DISCONTINUED | OUTPATIENT
Start: 2023-09-06 | End: 2023-09-08 | Stop reason: HOSPADM

## 2023-09-06 RX ORDER — CALCIUM GLUCONATE 20 MG/ML
1000 INJECTION, SOLUTION INTRAVENOUS ONCE
Status: COMPLETED | OUTPATIENT
Start: 2023-09-06 | End: 2023-09-06

## 2023-09-06 RX ORDER — HYDROXYZINE 50 MG/1
50 TABLET, FILM COATED ORAL NIGHTLY
Status: DISCONTINUED | OUTPATIENT
Start: 2023-09-06 | End: 2023-09-08 | Stop reason: HOSPADM

## 2023-09-06 RX ORDER — SODIUM CHLORIDE 9 MG/ML
40 INJECTION, SOLUTION INTRAVENOUS AS NEEDED
Status: DISCONTINUED | OUTPATIENT
Start: 2023-09-06 | End: 2023-09-08 | Stop reason: HOSPADM

## 2023-09-06 RX ORDER — SODIUM CHLORIDE 0.9 % (FLUSH) 0.9 %
10 SYRINGE (ML) INJECTION AS NEEDED
Status: DISCONTINUED | OUTPATIENT
Start: 2023-09-06 | End: 2023-09-08 | Stop reason: HOSPADM

## 2023-09-06 RX ORDER — ACETAMINOPHEN 650 MG/1
650 SUPPOSITORY RECTAL EVERY 6 HOURS PRN
Status: DISCONTINUED | OUTPATIENT
Start: 2023-09-06 | End: 2023-09-08 | Stop reason: HOSPADM

## 2023-09-06 RX ADMIN — HYDROMORPHONE HYDROCHLORIDE 0.5 MG: 1 INJECTION, SOLUTION INTRAMUSCULAR; INTRAVENOUS; SUBCUTANEOUS at 01:26

## 2023-09-06 RX ADMIN — Medication 10 ML: at 20:53

## 2023-09-06 RX ADMIN — DEXTROSE AND SODIUM CHLORIDE 150 ML/HR: 5; 450 INJECTION, SOLUTION INTRAVENOUS at 03:05

## 2023-09-06 RX ADMIN — INSULIN HUMAN 0.05 UNITS/KG/HR: 1 INJECTION, SOLUTION INTRAVENOUS at 15:32

## 2023-09-06 RX ADMIN — PROCHLORPERAZINE EDISYLATE 5 MG: 5 INJECTION INTRAMUSCULAR; INTRAVENOUS at 05:19

## 2023-09-06 RX ADMIN — METOPROLOL TARTRATE 12.5 MG: 25 TABLET, FILM COATED ORAL at 08:48

## 2023-09-06 RX ADMIN — HYDROXYZINE HYDROCHLORIDE 50 MG: 50 TABLET ORAL at 20:52

## 2023-09-06 RX ADMIN — Medication 10 ML: at 08:49

## 2023-09-06 RX ADMIN — ONDANSETRON 4 MG: 2 INJECTION INTRAMUSCULAR; INTRAVENOUS at 10:59

## 2023-09-06 RX ADMIN — CALCIUM GLUCONATE 1000 MG: 20 INJECTION, SOLUTION INTRAVENOUS at 04:58

## 2023-09-06 RX ADMIN — DEXTROSE AND SODIUM CHLORIDE 150 ML/HR: 5; 450 INJECTION, SOLUTION INTRAVENOUS at 11:25

## 2023-09-06 RX ADMIN — FLUOXETINE HYDROCHLORIDE 20 MG: 20 CAPSULE ORAL at 08:48

## 2023-09-06 RX ADMIN — PROCHLORPERAZINE EDISYLATE 5 MG: 5 INJECTION INTRAMUSCULAR; INTRAVENOUS at 20:52

## 2023-09-06 RX ADMIN — HYDROMORPHONE HYDROCHLORIDE 0.5 MG: 1 INJECTION, SOLUTION INTRAMUSCULAR; INTRAVENOUS; SUBCUTANEOUS at 05:19

## 2023-09-06 RX ADMIN — SODIUM BICARBONATE 50 MEQ: 84 INJECTION INTRAVENOUS at 04:59

## 2023-09-06 RX ADMIN — FUROSEMIDE 40 MG: 10 INJECTION, SOLUTION INTRAMUSCULAR; INTRAVENOUS at 04:59

## 2023-09-06 RX ADMIN — ENOXAPARIN SODIUM 40 MG: 40 INJECTION SUBCUTANEOUS at 23:00

## 2023-09-06 RX ADMIN — ACETAMINOPHEN 650 MG: 650 SUPPOSITORY RECTAL at 04:59

## 2023-09-06 NOTE — PROGRESS NOTES
[facility] HOSPITALIST PROGRESS NOTE     Patient Identification:  Name:  Herminio Matson  Age:  52 y.o.  Sex:  male  :  1970  MRN:  [id number]  Visit Number:  [id number]  ROOM: [place] [address]     Primary Care Provider:  Zain     Length of stay in inpatient status:  1    Subjective     Chief Compliant:    Chief Complaint   Patient presents with    Abdominal Pain       History of Presenting Illness: Patient seen and evaluated in follow-up for hypertriglyceridemia induced pancreatitis.  Patient this morning still having some nausea and early episode of emesis this morning but reports some improvement in tolerating some clear liquid intake.  Triglycerides continue to improve on insulin drip.    Objective     Current Hospital Meds:  enoxaparin, 40 mg, Subcutaneous, Nightly  FLUoxetine, 20 mg, Oral, Daily  hydrOXYzine, 50 mg, Oral, Nightly  metoprolol tartrate, 12.5 mg, Oral, Daily  sodium chloride, 10 mL, Intravenous, Q12H  sodium chloride, 10 mL, Intravenous, Q12H      dextrose 5 % and sodium chloride 0.45 %, 150 mL/hr, Last Rate: 150 mL/hr (23 1125)  insulin, 0.05 Units/kg/hr, Last Rate: 0.05 Units/kg/hr (23 1532)  Pharmacy to Dose enoxaparin (LOVENOX),       ----------------------------------------------------------------------------------------------------------------------  Vital Signs:  Temp:  [97.5 °F (36.4 °C)-100.8 °F (38.2 °C)] 98.3 °F (36.8 °C)  Heart Rate:  [] 90  Resp:  [12-19] 18  BP: (115-147)/() 122/64  SpO2:  [91 %-100 %] 95 %  on   ;   Device (Oxygen Therapy): room air  Body mass index is 27.39 kg/m².      Intake/Output Summary (Last 24 hours) at 2023 1824  Last data filed at 2023 0800  Gross per 24 hour   Intake 1416.6 ml   Output 900 ml   Net 516.6 ml      ----------------------------------------------------------------------------------------------------------------------  Physical exam:  Constitutional:  Well-developed and  well-nourished.  No respiratory distress.      HENT:  Head: Normocephalic and atraumatic.  Mouth:  Moist mucous membranes.    Eyes:  Conjunctivae and EOM are normal.  Pupils are equal, round, and reactive to light.  No scleral icterus.  Neck:  Neck supple.  No JVD present.    Cardiovascular:  Normal rate, regular rhythm and normal heart sounds with no murmur.  Pulmonary/Chest:  No respiratory distress, no wheezes, no crackles, with normal breath sounds and good air movement.  Abdominal:  Soft.  Bowel sounds are normal.  No distention but there is tenderness to palpation in the epigastric region but improved from day prior.   Musculoskeletal:  No tenderness and no deformity.  No red or swollen joints anywhere.    Neurological: Slightly drowsy but oriented to person, place, and time.  No cranial nerve deficit.  No tongue deviation.  No facial droop.  No slurred speech.   Skin:  Skin is warm and dry.  No rash noted.  No pallor.   Peripheral vascular:  No edema and pulses on all 4 extremities.  ----------------------------------------------------------------------------------------------------------------------  WBC/HGB/HCT/PLT   17.47/16.1/44.6/308 (09/06 0903)  BUN/CREAT/GLUC/ALT/AST/NADYA/LIP    10/0.74/144/--/--/--/-- (09/06 0712)  LYTES - Na/K/Cl/CO2: 135*/4.1/102/23.4 (09/06 0712)        No results found for: URINECX  No results found for: BLOODCX    I have personally looked at the labs and they are summarized above.  ----------------------------------------------------------------------------------------------------------------------  Detailed radiology reports for the last 24 hours:  CT Abdomen Pelvis With Contrast    Result Date: 9/5/2023  1.  Stranding around the pancreas suggestive of acute pancreatitis. 2.  Tiny calcific density measuring about 2 mm in region of head of pancreas could represent distal duct stone and evaluation with ultrasound or MRCP could be considered for further evaluation. 3.  Degenerative  changes lumbar spine as described.  This report was finalized on 9/5/2023 3:48 PM by Dr. Perry Roberts MD.      US Gallbladder    Result Date: 9/5/2023    No acute findings in the right upper quadrant.  This report was finalized on 9/5/2023 4:29 PM by Dr. Perry Roberts MD.      MRI abdomen wo contrast mrcp    Result Date: 9/5/2023   1.  Cholelithiasis. 2.  No features of choledocholithiasis. 3.  No features of cholecystitis. 4.  Mild stranding around the pancreas and duodenum suggestive of underlying mild acute pancreatitis. 5.  Contribution from mild duodenitis is not excluded. 6.  No acute process seen in the liver, spleen, adrenal glands, and kidneys. 7.  Very small parapelvic renal cysts. 8.  Minimal nonspecific stranding around the right and left kidney.  This report was finalized on 9/5/2023 8:21 PM by Mukund Brennan MD.     Assessment & Plan      Acute pancreatitis  Severe hypertriglyceridemia  Intractable nausea and vomiting    -Patient presenting with nausea and vomiting associated with acute pancreatitis in the setting of markedly elevated triglycerides greater than 4000.  Patient with a past medical history significant for hypertriglyceridemia and previous induced pancreatitis in the past last episode 3 years prior.    -Patient admits that he has been out of his fibrate until a week ago for approximately 2 to 3 weeks large suspicion that this is a contributing factor.    -Initiate patient on D5 half-normal saline for volume expansion in the setting of pancreatitis as well as need for glucose administration while initiating insulin drip for hypertriglyceridemia.    -Triglycerides every 12 hours until triglycerides less than 500 at which point we will turn off insulin drip and transition to oral statin and fibrate    -We will resume any of patient's home medications as appropriate.    -Lipase greater than 3000 with typical abdominal pain and imaging findings consistent with pancreatitis.     Hyponatremia,  resolved    -Patient with corrected sodium of 129 initially at presentation now improved to 135 with volume resuscitation.     Diabetes mellitus type 2    -Patient currently on insulin drip for treatment of hypertriglyceridemia as above, will monitor glucose closely and adjust insulin drip as needed.    -Every hour glucose checks while on insulin drip.     Hypertension    -Resume home antihypertensives once reconciled    Copied text in portions of the note has been reviewed and is accurate as of 09/06/23    VTE Prophylaxis:   Mechanical Order History:       None          Pharmalogical Order History:        Ordered     Dose Route Frequency Stop    09/05/23 1842  Enoxaparin Sodium (LOVENOX) syringe 40 mg         40 mg SC Nightly --    09/05/23 1840  Pharmacy to Dose enoxaparin (LOVENOX)        Question:  Indication of use  Answer:  Prophylaxis    -- XX Continuous PRN --                    Disposition Home once hypertriglyceridemia improved and resolved and patient tolerating oral intake with regular diet.    Deangelo Thomason DO  [facility]  09/06/23  18:24 EDT

## 2023-09-06 NOTE — PAYOR COMM NOTE
"CONTACT:  EILEEN MORFIN RN  UTILIZATION MANAGEMENT DEPT.   [facility]   [address] [facility], [address]   PHONE:  [phone]   FAX: [phone]     INPATIENT AUTH REQUEST      Kylie Matson (52 y.o. Male)       Date of Birth   1970    Social Security Number       Address   [address] [facility] [address]    Home Phone   [phone]    MRN   [id number]       Caodaism   Non-Sikh    Marital Status                               Admission Date   23    Admission Type   Emergency    Admitting Provider   Deangelo Thomason DO    Attending Provider   Deangelo Thomason DO    Department, Room/Bed   [facility] PROGRESS CARE, P203/S2       Discharge Date       Discharge Disposition       Discharge Destination                                 Attending Provider: Deangelo Thomason DO    Allergies: No Known Allergies    Isolation: None   Infection: None   Code Status: CPR    Ht: 182.9 cm (72\")   Wt: 91.6 kg (201 lb 15.1 oz)    Admission Cmt: None   Principal Problem: Pancreatitis [K85.90]                   Active Insurance as of 2023       Primary Coverage       Payor Plan Insurance Group Employer/Plan Group    [employer] BLUE CROSS AN[employer] BLUE CROSS BLUE SHIELD PPO [id number]       Payor Plan Address Payor Plan Phone Number Payor Plan Fax Number Effective Dates    [phone]  2023 - None Entered    [place] [address]         Subscriber Name Subscriber Birth Date Member ID       KYLIE MATSON 1970 [id number]                     Emergency Contacts        (Rel.) Home Phone Work Phone Mobile Phone    NIKKI MATSON (Spouse) [phone] -- [phone]                     History & Physical        Deangelo Thomason DO at 23 1845                [facility] HOSPITALIST HISTORY AND PHYSICAL    Patient Identification:  Name:  Kylie Matson  Age:  52 y.o.  Sex:  male  :  1970  MRN:  [id number] "   Visit Number:  [id number]  Admit Date: 9/5/2023   Room number:  H101/H1  Primary Care Physician:  Zain April, APRN     Subjective     Chief complaint:    Chief Complaint   Patient presents with    Abdominal Pain       History of presenting illness:  52 y.o. male who presents to the hospital with onset of nausea and vomiting with epigastric pain early this morning.  Patient has a past medical history significant for hypertriglyceridemia with prior episode of pancreatitis on fibrate and statin, hypertension, and diabetes mellitus type 2.  Patient states that symptoms began earlier this morning.  He denies any fevers or chills associated with this.  He denies any chest pain, shortness of breath.  Patient reports inability to keep down oral food today hence he came to the hospital for further evaluation.  Patient does report past history of prior hospitalization for pancreatitis secondary to hypertriglyceridemia approximately 3 years ago in [place].  When asked if he is missed or run out of any of his cholesterol/lipid-lowering medications patient does report that up until last week when he followed up with his PCP he had been without his fibrate for several weeks.  When asked if repeat lab work was obtained at his last PCP visit he does report that his triglycerides reported to be in the 6000's he thinks and his wife was unaware of this.  Patient's triglycerides today in the emergency room elevated at 4400.  Patient states that he feels a little bit drowsy/sluggish but otherwise denies any other acute complaints.  No diarrhea or constipation no dysuria.  ---------------------------------------------------------------------------------------------------------------------   Review of Systems a 14 system review of systems was performed with pertinent positives and negatives as above in the  HPI.  ---------------------------------------------------------------------------------------------------------------------   Past Medical History:   Diagnosis Date    Anxiety     Hypertension     Pancreatitis      History reviewed. No pertinent surgical history.  History reviewed. No pertinent family history.  Social History     Socioeconomic History    Marital status:      ---------------------------------------------------------------------------------------------------------------------   Allergies:  Patient has no known allergies.  ---------------------------------------------------------------------------------------------------------------------   Medications below are reported home medications pulling from within the system; at this time, these medications have not been reconciled unless otherwise specified and are in the verification process for further verifcation as current home medications.    Prior to Admission Medications       Prescriptions Last Dose Informant Patient Reported? Taking?    FLUoxetine (PROzac) 10 MG capsule   Yes No    Take 1 capsule by mouth Daily.    hydrOXYzine pamoate (VISTARIL) 25 MG capsule   Yes No    Take 1 capsule by mouth 3 (Three) Times a Day As Needed for Itching.    metoprolol tartrate (LOPRESSOR) 50 MG tablet   Yes No    Take 1 tablet by mouth 2 (Two) Times a Day.          Objective     Vital Signs:  Temp:  [98.1 °F (36.7 °C)-98.7 °F (37.1 °C)] 98.7 °F (37.1 °C)  Heart Rate:  [67-92] 92  Resp:  [18-20] 20  BP: (129-153)/(76-87) 129/77    No data found.  SpO2:  [94 %-98 %] 94 %  on   ;   Device (Oxygen Therapy): room air  Body mass index is 28.48 kg/m².    Wt Readings from Last 3 Encounters:   09/05/23 95.3 kg (210 lb)      ---------------------------------------------------------------------------------------------------------------------   Physical Exam:  Constitutional:  Well-developed and well-nourished.  No respiratory distress.      HENT:  Head: Normocephalic  and atraumatic.  Mouth:  Moist mucous membranes.    Eyes:  Conjunctivae and EOM are normal.  Pupils are equal, round, and reactive to light.  No scleral icterus.  Neck:  Neck supple.  No JVD present.    Cardiovascular:  Normal rate, regular rhythm and normal heart sounds with no murmur.  Pulmonary/Chest:  No respiratory distress, no wheezes, no crackles, with normal breath sounds and good air movement.  Abdominal:  Soft.  Bowel sounds are normal.  No distention but there is tenderness to palpation in the epigastric region..   Musculoskeletal:  No tenderness and no deformity.  No red or swollen joints anywhere.    Neurological: Slightly drowsy but oriented to person, place, and time.  No cranial nerve deficit.  No tongue deviation.  No facial droop.  No slurred speech.   Skin:  Skin is warm and dry.  No rash noted.  No pallor.   Peripheral vascular:  No edema and pulses on all 4 extremities.    ---------------------------------------------------------------------------------------------------------------------  EKG:   Ordered and pending at this time    --------------------------------------------------------------------------------------------------------------------  Labs:  Results from last 7 days   Lab Units 09/05/23  1347   LACTATE mmol/L 1.5   WBC 10*3/mm3 11.62*   HEMOGLOBIN g/dL 19.4*   HEMATOCRIT % 47.2   MCV fL 91.7   MCHC g/dL 36.9*   PLATELETS 10*3/mm3 306         Results from last 7 days   Lab Units 09/05/23  1347   SODIUM mmol/L 126*   POTASSIUM mmol/L 5.8*   CHLORIDE mmol/L 94*   CO2 mmol/L 17.5*   BUN mg/dL 16   CREATININE mg/dL 1.16   GLUCOSE mg/dL 238*   ALBUMIN g/dL 0.4*   BILIRUBIN mg/dL 0.2   ALK PHOS U/L 75   AST (SGOT) U/L 28   ALT (SGPT) U/L 16   Estimated Creatinine Clearance: 87.2 mL/min (by C-G formula based on SCr of 1.16 mg/dL).    No results found for: AMMONIA      Results from last 7 days   Lab Units 09/05/23  1347   TRIGLYCERIDES mg/dL >4,425*     No results found for: HGBA1C,  POCGLU  No results found for: TSH, FREET4  No results found for: PREGTESTUR, PREGSERUM, HCG, HCGQUANT  Pain Management Panel           No data to display              Brief Urine Lab Results  (Last result in the past 365 days)        Color   Clarity   Blood   Leuk Est   Nitrite   Protein   CREAT   Urine HCG        09/05/23 1347 Yellow   Clear   Negative   Negative   Negative   Trace                 No results found for: BLOODCX  No results found for: URINECX  No results found for: WOUNDCX  No results found for: STOOLCX    I have personally looked at the labs and they are summarized above.  ----------------------------------------------------------------------------------------------------------------------  Detailed radiology reports for the last 24 hours:    Imaging Results (Last 24 Hours)       Procedure Component Value Units Date/Time    MRI abdomen wo contrast mrcp [[id number]] Resulted: 09/05/23 1639     Updated: 09/05/23 1704    US Gallbladder [[id number]] Collected: 09/05/23 1629     Updated: 09/05/23 1631    Narrative:      EXAM:    US Abdomen Limited, Gallbladder     EXAM DATE:    9/5/2023 4:16 PM     CLINICAL HISTORY:    ruq abdominal pain     TECHNIQUE:    Real-time ultrasound of the right upper quadrant with image  documentation.     COMPARISON:    No relevant prior studies available.     FINDINGS:    Liver:  Fatty infiltration of the liver.    Gallbladder:  Unremarkable.  No gallstones.    Common bile duct:  Unremarkable as visualized.  No stones.  No  dilation.  Common bile duct measures 0.22 cm in diameter.    Pancreas:  Unremarkable as visualized.       Impression:        No acute findings in the right upper quadrant.     This report was finalized on 9/5/2023 4:29 PM by Dr. Perry Roberts MD.       CT Abdomen Pelvis With Contrast [[id number]] Collected: 09/05/23 1547     Updated: 09/05/23 1550    Narrative:      EXAM:    CT Abdomen and Pelvis With Intravenous Contrast     EXAM DATE:    9/5/2023 3:13 PM      CLINICAL HISTORY:    abdominal pain     TECHNIQUE:    Axial computed tomography images of the abdomen and pelvis with  intravenous contrast.  Sagittal and coronal reformatted images were  created and reviewed.  This CT exam was performed using one or more of  the following dose reduction techniques:  automated exposure control,  adjustment of the mA and/or kV according to patient size, and/or use of  iterative reconstruction technique.     COMPARISON:    No relevant prior studies available.     FINDINGS:    LUNG BASES:  Unremarkable.  No mass.  No consolidation.      ABDOMEN:    LIVER:  Unremarkable.  No mass.    GALLBLADDER AND BILE DUCTS:  Unremarkable.  No calcified stones.  No  ductal dilation.    PANCREAS:  Stranding around the pancreas suggestive of acute  pancreatitis.  Tiny calcific density measuring about 2 mm in region of  head of pancreas could represent distal duct stone and evaluation with  ultrasound or MRCP could be considered for further evaluation.    SPLEEN:  Unremarkable.  No splenomegaly.    ADRENALS:  Unremarkable.  No mass.    KIDNEYS AND URETERS:  Unremarkable.  No solid mass.  No  hydronephrosis.    STOMACH AND BOWEL:  Unremarkable.  No obstruction.  No mucosal  thickening.      PELVIS:    APPENDIX:  No findings to suggest acute appendicitis.    BLADDER:  Unremarkable.  No mass.    REPRODUCTIVE:  Unremarkable as visualized.      ABDOMEN and PELVIS:    INTRAPERITONEAL SPACE:  Unremarkable.  No free air.  No significant  fluid collection.    BONES/JOINTS:  Degenerative disc disease throughout the lumbar spine.   Degenerative facet arthropathy throughout the lumbar spine, most  prominent in the lower lumbar spine.  No acute fracture.  No  dislocation.    SOFT TISSUES:  Unremarkable.    VASCULATURE:  Atherosclerotic disease.  No abdominal aortic aneurysm.    LYMPH NODES:  Unremarkable.  No enlarged lymph nodes.       Impression:      1.  Stranding around the pancreas suggestive of acute  pancreatitis.  2.  Tiny calcific density measuring about 2 mm in region of head of  pancreas could represent distal duct stone and evaluation with  ultrasound or MRCP could be considered for further evaluation.  3.  Degenerative changes lumbar spine as described.     This report was finalized on 9/5/2023 3:48 PM by Dr. Perry Roberts MD.             Final impressions for the last 30 days of radiology reports:    CT Abdomen Pelvis With Contrast    Result Date: 9/5/2023  1.  Stranding around the pancreas suggestive of acute pancreatitis. 2.  Tiny calcific density measuring about 2 mm in region of head of pancreas could represent distal duct stone and evaluation with ultrasound or MRCP could be considered for further evaluation. 3.  Degenerative changes lumbar spine as described.  This report was finalized on 9/5/2023 3:48 PM by Dr. Perry Roberts MD.      US Gallbladder    Result Date: 9/5/2023    No acute findings in the right upper quadrant.  This report was finalized on 9/5/2023 4:29 PM by Dr. Perry Roberts MD.     I have personally looked at the radiology images and read the final radiology report.    Assessment & Plan       Acute pancreatitis  Severe hypertriglyceridemia  Intractable nausea and vomiting    -Patient presenting with nausea and vomiting associated with acute pancreatitis in the setting of markedly elevated triglycerides greater than 4000.  Patient with a past medical history significant for hypertriglyceridemia and previous induced pancreatitis in the past last episode 3 years prior.    -Patient admits that he has been out of his fibrate until a week ago for approximately 2 to 3 weeks large suspicion that this is a contributing factor.    -Initiate patient on D5 half-normal saline for volume expansion in the setting of pancreatitis as well as need for glucose administration while initiating insulin drip for hypertriglyceridemia.    -Triglycerides every 12 hours until triglycerides less than 500 at  which point we will turn off insulin drip.    -We will resume any of patient's home medications as appropriate.    -Lipase greater than 3000 with typical abdominal pain and imaging findings consistent with pancreatitis.    Hyponatremia    -Patient with corrected sodium of 129 not clinically significant at this time, will continue to monitor on chemistry panels.    Diabetes mellitus type 2    -Patient currently on insulin drip for treatment of hypertriglyceridemia as above, will monitor glucose closely and adjust insulin drip as needed.    -Every hour glucose checks while on insulin drip.    Hypertension    -Resume home antihypertensives once reconciled    VTE Prophylaxis:   Mechanical Order History:       None          Pharmalogical Order History:        Ordered     Dose Route Frequency Stop    09/05/23 1842  Enoxaparin Sodium (LOVENOX) syringe 40 mg         40 mg SC Nightly --    09/05/23 1840  Pharmacy to Dose enoxaparin (LOVENOX)        Question:  Indication of use  Answer:  Prophylaxis    -- XX Continuous PRN --                    The patient is high risk due to the following diagnoses/reasons: Hypertrophy acidemia induced acute pancreatitis with intractable nausea and vomiting.        Deangelo Thomason DO  [facility]  09/05/23  18:46 EDT    Electronically signed by Deangelo Thomason DO at 09/05/23 1900          Emergency Department Notes        Ino Mishra PCT at 09/05/23 1835          Radiology contacted for resend on MRI scan.     Electronically signed by Ino Mishra PCT at 09/05/23 1835       Chun Fernandez PA-C at 09/05/23 1325          Subjective   History of Present Illness  This is a 52-year-old male who presents to the emergency department chief complaint epigastric abdominal pain that radiates to his back.  Patient has had 1 episode of associated nausea and vomiting.  Patient has history of pancreatitis from hypertriglyceridemia.    History provided by:   Patient   used: No    Abdominal Pain  Pain location:  Generalized  Pain quality: aching    Pain radiates to:  Does not radiate  Pain severity:  Moderate  Onset quality:  Gradual  Duration:  2 days  Timing:  Intermittent  Progression:  Worsening  Chronicity:  New  Context: not alcohol use, not awakening from sleep, not diet changes, not laxative use, not previous surgeries, not recent illness, not recent sexual activity, not retching, not sick contacts and not suspicious food intake    Relieved by:  Nothing  Worsened by:  Nothing  Ineffective treatments:  None tried  Associated symptoms: chills, fatigue and nausea    Associated symptoms: no cough, no shortness of breath, no vaginal bleeding, no vaginal discharge and no vomiting    Risk factors: no alcohol abuse, no aspirin use, has not had multiple surgeries, no NSAID use, not pregnant and no recent hospitalization      Review of Systems   Constitutional:  Positive for chills and fatigue.   Eyes: Negative.  Negative for photophobia, pain, redness and itching.   Respiratory:  Negative for cough, choking, shortness of breath and stridor.    Gastrointestinal:  Positive for abdominal pain and nausea. Negative for vomiting.   Endocrine: Negative.    Genitourinary: Negative.  Negative for enuresis, flank pain, frequency, penile discharge, penile pain, vaginal bleeding and vaginal discharge.   All other systems reviewed and are negative.    Past Medical History:   Diagnosis Date    Anxiety     Hypertension     Pancreatitis        No Known Allergies    History reviewed. No pertinent surgical history.    History reviewed. No pertinent family history.    Social History     Socioeconomic History    Marital status:            Objective   Physical Exam  Vitals and nursing note reviewed.   Constitutional:       General: He is not in acute distress.     Appearance: Normal appearance. He is well-developed and normal weight. He is not ill-appearing or  toxic-appearing.   HENT:      Head: Normocephalic and atraumatic.      Nose: Nose normal.      Mouth/Throat:      Mouth: Mucous membranes are moist.      Pharynx: Oropharynx is clear. No pharyngeal swelling.   Eyes:      General: No scleral icterus.     Extraocular Movements: Extraocular movements intact.      Conjunctiva/sclera: Conjunctivae normal.      Pupils: Pupils are equal, round, and reactive to light.   Cardiovascular:      Rate and Rhythm: Normal rate and regular rhythm.      Heart sounds: Normal heart sounds. No murmur heard.    No friction rub. No gallop.   Pulmonary:      Effort: Pulmonary effort is normal. No respiratory distress.      Breath sounds: Normal breath sounds. No stridor. No wheezing, rhonchi or rales.   Abdominal:      General: Abdomen is flat and protuberant. Bowel sounds are normal.      Palpations: Abdomen is soft. There is no shifting dullness, fluid wave, hepatomegaly, splenomegaly, mass or pulsatile mass.      Tenderness: There is abdominal tenderness in the epigastric area. There is guarding.      Hernia: There is no hernia in the umbilical area, ventral area, left inguinal area, right femoral area or right inguinal area.   Musculoskeletal:         General: Normal range of motion.      Cervical back: Normal range of motion and neck supple.   Skin:     General: Skin is warm and dry.      Capillary Refill: Capillary refill takes less than 2 seconds.      Coloration: Skin is not cyanotic, jaundiced, mottled or pale.      Findings: No erythema.   Neurological:      General: No focal deficit present.      Mental Status: He is alert and oriented to person, place, and time.   Psychiatric:         Mood and Affect: Mood normal. Mood is not anxious or depressed.         Behavior: Behavior normal.         Thought Content: Thought content normal.         Judgment: Judgment normal.       Procedures          ED Course  ED Course as of 09/05/23 1910 Tue Sep 05, 2023   5641 IMPRESSION:  1.   Stranding around the pancreas suggestive of acute pancreatitis.  2.  Tiny calcific density measuring about 2 mm in region of head of  pancreas could represent distal duct stone and evaluation with  ultrasound or MRCP could be considered for further evaluation.  3.  Degenerative changes lumbar spine as described.   []   1706 Discussed care with Dr. Zaldivar.  Await MRCP. []      ED Course User Index  [] Chun Fernandez PA-C                                           Medical Decision Making  Problems Addressed:  Idiopathic acute pancreatitis without infection or necrosis: complicated acute illness or injury    Amount and/or Complexity of Data Reviewed  Labs: ordered.  Radiology: ordered.    Risk  Prescription drug management.  Decision regarding hospitalization.        Final diagnoses:   Idiopathic acute pancreatitis without infection or necrosis   Hypertriglyceridemia       ED Disposition  ED Disposition       ED Disposition   Decision to Admit    Condition   --    Comment   Level of Care: Progressive Care [20]   Diagnosis: Pancreatitis [202663]   Certification: I Certify That Inpatient Hospital Services Are Medically Necessary For Greater Than 2 Midnights                 No follow-up provider specified.       Medication List      No changes were made to your prescriptions during this visit.            Cuhn Fernandez PA-C  09/05/23 1910      Electronically signed by Chun Fernandez PA-C at 09/05/23 1910       Facility-Administered Medications as of 9/6/2023   Medication Dose Route Frequency Provider Last Rate Last Admin    acetaminophen (TYLENOL) suppository 650 mg  650 mg Rectal Q6H PRN Shobha Bishop DO   650 mg at 09/06/23 0459    [COMPLETED] calcium gluconate 1000 Mg/50ml 0.675% NaCl IV SOLN  1,000 mg Intravenous Once Shobha Bishop DO   1,000 mg at 09/06/23 0458    Calcium Replacement - Follow Nurse / BPA Driven Protocol   Does not apply PRN Deangeol Thomason DO        dextrose  (D50W) (25 g/50 mL) IV injection 25 g  25 g Intravenous Q15 Min PRN Deangelo Thomason DO        dextrose (GLUTOSE) oral gel 15 g  15 g Oral Q15 Min PRN Deangelo Thomason DO        dextrose 5 % and sodium chloride 0.45 % infusion  150 mL/hr Intravenous Continuous Deangelo Thomason  mL/hr at 09/06/23 0305 150 mL/hr at 09/06/23 0305    Enoxaparin Sodium (LOVENOX) syringe 40 mg  40 mg Subcutaneous Nightly Deangelo Thomason DO   40 mg at 09/05/23 2050    FLUoxetine (PROzac) capsule 20 mg  20 mg Oral Daily Deangelo Thomason DO        [COMPLETED] furosemide (LASIX) injection 40 mg  40 mg Intravenous Once Shobha Bishop DO   40 mg at 09/06/23 0459    glucagon HCl (Diagnostic) injection 1 mg  1 mg Intramuscular Q15 Min PRN Deangelo Thomason DO        HYDROmorphone (DILAUDID) injection 0.5 mg  0.5 mg Intravenous Q2H PRN Deangelo Thomason DO   0.5 mg at 09/06/23 0519    [COMPLETED] HYDROmorphone (DILAUDID) injection 1 mg  1 mg Intravenous Once Hailee Fernandez MD   1 mg at 09/05/23 1402    [COMPLETED] HYDROmorphone (DILAUDID) injection 1 mg  1 mg Intravenous Once Hailee Fernandez MD   1 mg at 09/05/23 1512    [COMPLETED] HYDROmorphone (DILAUDID) injection 1 mg  1 mg Intravenous Once Hailee Fernandez MD   1 mg at 09/05/23 1740    [COMPLETED] HYDROmorphone (DILAUDID) injection 1 mg  1 mg Intravenous Once Hailee Fernandez MD   1 mg at 09/05/23 1836    hydrOXYzine (ATARAX) tablet 50 mg  50 mg Oral Nightly Deangelo Thomason DO        insulin regular 1 unit/mL in 0.9% sodium chloride (HYPERTRIGLYCERIDEMIA) - NOT FOR USE WITH GLUCOMMANDER  0.05 Units/kg/hr Intravenous Continuous Deangelo Thomason DO 4.77 mL/hr at 09/05/23 2142 0.05 Units/kg/hr at 09/05/23 2142    [COMPLETED] iopamidol (ISOVUE-300) 61 % injection 100 mL  100 mL Intravenous Once in imaging Hailee Fernandez MD   86 mL at 09/05/23 1530    Magnesium Standard Dose Replacement - Follow Nurse / BPA Driven Protocol   Does not apply PRN Deangelo Thomason DO        metoprolol tartrate  (LOPRESSOR) tablet 12.5 mg  12.5 mg Oral Daily Deangelo Thomason DO        [COMPLETED] ondansetron (ZOFRAN) injection 4 mg  4 mg Intravenous Once Hailee Fernandez MD   4 mg at 09/05/23 1402    Pharmacy Consult - Calculate corrected sodium if level falls below 135  1 each Does not apply PRN Deangelo Thomason DO        Pharmacy to Dose enoxaparin (LOVENOX)   Does not apply Continuous PRN Deangelo Thomason DO        Phosphorus Replacement - Follow Nurse / BPA Driven Protocol   Does not apply PRN Deangelo Thomason DO        Potassium Replacement - Follow Nurse / BPA Driven Protocol   Does not apply PRN Deangelo Thomason DO        [COMPLETED] prochlorperazine (COMPAZINE) injection 10 mg  10 mg Intravenous Once Hailee Fernandez MD   10 mg at 09/05/23 1740    prochlorperazine (COMPAZINE) injection 5 mg  5 mg Intravenous Q6H PRN Deangelo Thomason DO   5 mg at 09/06/23 0519    [COMPLETED] sodium bicarbonate injection 8.4% 50 mEq  50 mEq Intravenous Once Shobha Bishop DO   50 mEq at 09/06/23 0459    [COMPLETED] sodium chloride 0.9 % bolus 1,000 mL  1,000 mL Intravenous Once Chun Fernandez PA-C   Stopped at 09/05/23 1440    [COMPLETED] sodium chloride 0.9 % bolus 1,000 mL  1,000 mL Intravenous Once Chun Fernandez PA-C   Stopped at 09/05/23 1521    sodium chloride 0.9 % flush 10 mL  10 mL Intravenous PRN Chun Fernandez PA-C        sodium chloride 0.9 % flush 10 mL  10 mL Intravenous PRN Chun Fernandez PA-C        sodium chloride 0.9 % flush 10 mL  10 mL Intravenous Q12H Deangelo Thomason DO   10 mL at 09/05/23 2051    sodium chloride 0.9 % flush 10 mL  10 mL Intravenous PRN Deangelo Thomason DO        sodium chloride 0.9 % flush 10 mL  10 mL Intravenous Q12H Deangelo Thomason DO        sodium chloride 0.9 % flush 10 mL  10 mL Intravenous PRN Deangelo Thomason DO        sodium chloride 0.9 % infusion 40 mL  40 mL Intravenous PRN Deangelo Thomason DO        sodium chloride 0.9 % infusion 40 mL  40 mL Intravenous PRN  Deangelo Thomason DO         Orders (all)        Start     Ordered    09/07/23 1816  Basic Metabolic Panel  Daily       09/05/23 1840    09/07/23 0930  BNP  Once         09/06/23 0508    09/06/23 2100  hydrOXYzine (ATARAX) tablet 50 mg  Nightly         09/06/23 0752    09/06/23 0900  metoprolol tartrate (LOPRESSOR) tablet 12.5 mg  Daily         09/06/23 0752    09/06/23 0900  FLUoxetine (PROzac) capsule 20 mg  Daily         09/06/23 0752    09/06/23 0900  sodium chloride 0.9 % flush 10 mL  Every 12 Hours Scheduled         09/06/23 0801    09/06/23 0805  Blood Culture - Blood,  Once        See Hyperspace for full Linked Orders Report.    09/06/23 0804    09/06/23 0805  Blood Culture - Blood,  Once        Comments: From a different site than #1.     See Hyperspace for full Linked Orders Report.    09/06/23 0804    09/06/23 0802  Connectors / Hubs Must Be Scrubbed 15 Seconds Using 70% Alcohol Before Access - Allow to Dry Before Accessing Line  Continuous         09/06/23 0801    09/06/23 0801  sodium chloride 0.9 % flush 10 mL  As Needed         09/06/23 0801    09/06/23 0801  sodium chloride 0.9 % infusion 40 mL  As Needed         09/06/23 0801    09/06/23 0800  Oral Care  2 Times Daily       09/05/23 1840    09/06/23 0800  Triglycerides  Every 12 Hours       09/05/23 1840    09/06/23 0753  CBC (No Diff)  Morning Draw         09/06/23 0753    09/06/23 0752  NPO Diet NPO Type: Sips with Meds  Diet Effective Now         09/06/23 0751    09/06/23 0730  POC Glucose Once  PROCEDURE ONCE        Comments: Complete no more than 45 minutes prior to patient eating      09/06/23 0723    09/06/23 0723  Activity As Tolerated  Until Discontinued         09/06/23 0722    09/06/23 0616  POC Glucose Once  PROCEDURE ONCE        Comments: Complete no more than 45 minutes prior to patient eating      09/06/23 0610    09/06/23 0600  Hemoglobin A1c  Morning Draw         09/05/23 1840    09/06/23 0553  POC Glucose Once  PROCEDURE ONCE         Comments: Complete no more than 45 minutes prior to patient eating      09/06/23 0546    09/06/23 0505  POC Glucose Once  PROCEDURE ONCE        Comments: Complete no more than 45 minutes prior to patient eating      09/06/23 0458    09/06/23 0445  calcium gluconate 1000 Mg/50ml 0.675% NaCl IV SOLN  Once         09/06/23 0351    09/06/23 0445  sodium bicarbonate injection 8.4% 50 mEq  Once         09/06/23 0351    09/06/23 0445  furosemide (LASIX) injection 40 mg  Once         09/06/23 0351    09/06/23 0438  acetaminophen (TYLENOL) suppository 650 mg  Every 6 Hours PRN         09/06/23 0438    09/06/23 0355  POC Glucose Once  PROCEDURE ONCE        Comments: Complete no more than 45 minutes prior to patient eating      09/06/23 0348    09/06/23 0352  Please order a repeat BMP timed for 4 hours after hyperkalemia treatment given  Misc Nursing Order (Specify)  Once        Comments: Please order a repeat BMP timed for 4 hours after hyperkalemia treatment given    09/06/23 0351    09/06/23 0235  POC Glucose Once  PROCEDURE ONCE        Comments: Complete no more than 45 minutes prior to patient eating      09/06/23 0229    09/06/23 0209  Basic Metabolic Panel  Timed         09/05/23 2150    09/06/23 0133  ECG 12 Lead Tachycardia  STAT         09/06/23 0132    09/06/23 0110  POC Glucose Once  PROCEDURE ONCE        Comments: Complete no more than 45 minutes prior to patient eating      09/06/23 0103    09/06/23 0045  POC Glucose Once  PROCEDURE ONCE        Comments: Complete no more than 45 minutes prior to patient eating      09/06/23 0037    09/05/23 2337  POC Glucose Once  PROCEDURE ONCE        Comments: Complete no more than 45 minutes prior to patient eating      09/05/23 2329    09/05/23 2309  Code Status and Medical Interventions:  Continuous         09/05/23 2308    09/05/23 2300  Basic Metabolic Panel  Once,   Status:  Canceled         09/05/23 1908    09/05/23 2240  POC Glucose Once  PROCEDURE ONCE         Comments: Complete no more than 45 minutes prior to patient eating      09/05/23 2232 09/05/23 2155  POC Glucose Once  PROCEDURE ONCE        Comments: Complete no more than 45 minutes prior to patient eating      09/05/23 2148 09/05/23 2153  ECG 12 Lead Rhythm Change  STAT,   Status:  Canceled         09/05/23 2152 09/05/23 2130  POC Glucose Once  PROCEDURE ONCE        Comments: Complete no more than 45 minutes prior to patient eating      09/05/23 2122 09/05/23 2100  sodium chloride 0.9 % flush 10 mL  Every 12 Hours Scheduled         09/05/23 1840 09/05/23 2100  Enoxaparin Sodium (LOVENOX) syringe 40 mg  Nightly         09/05/23 1842 09/05/23 2019  POC Glucose Once  PROCEDURE ONCE        Comments: Complete no more than 45 minutes prior to patient eating      09/05/23 2012 09/05/23 2000  Vital Signs  Every 4 Hours       09/05/23 1840 09/05/23 1903  POC Glucose Once  PROCEDURE ONCE        Comments: Complete no more than 45 minutes prior to patient eating      09/05/23 1856 09/05/23 1901  prochlorperazine (COMPAZINE) injection 5 mg  Every 6 Hours PRN         09/05/23 1901    09/05/23 1900  POC Glucose Q1H  Every Hour      Comments: While on Insulin Infusion for Hypertriglyceridemia      09/05/23 1840 09/05/23 1856  insulin regular 1 unit/mL in 0.9% sodium chloride (HYPERTRIGLYCERIDEMIA) - NOT FOR USE WITH GLUCOMMANDER  Continuous         09/05/23 1840 09/05/23 1856  dextrose 5 % and sodium chloride 0.45 % infusion  Continuous         09/05/23 1840 09/05/23 1855  ECG 12 Lead Electrolyte Imbalance  Once         09/05/23 1854 09/05/23 1850  Initiate & Follow Hypercapnic Monitoring Guideline for Opioid Administration via EtCO2 and / or SpO2  Continuous        Comments: Follow Hypercapnic Monitoring Guideline As Outlined in Process Instructions (Open Order Report to View Full Instructions)    09/05/23 1850 09/05/23 1850  Opioid Administration - Document EtCO2 and / or SpO2 With  Each Set of Vitals & Any Change in Patient Status  Per Order Details        Comments: With Each Set of Vitals & Any Change in Patient Status    09/05/23 1850    09/05/23 1850  Opioid Administration - Notify Provider Hypercapnic Monitoring  Until Discontinued         09/05/23 1850 09/05/23 1850  Opioid Administration - Continuous Pulse Oximetry (SpO2)  Continuous         09/05/23 1850 09/05/23 1841  Intake & Output  Every Shift       09/05/23 1840 09/05/23 1841  Weigh Patient  Once         09/05/23 1840 09/05/23 1841  Insert Peripheral IV  Once         09/05/23 1840 09/05/23 1841  Saline Lock & Maintain IV Access  Continuous         09/05/23 1840 09/05/23 1841  Activity - Ad Adela  Until Discontinued         09/05/23 1840 09/05/23 1841  NPO Diet NPO Type: Strict NPO  Diet Effective Now,   Status:  Canceled         09/05/23 1840 09/05/23 1841  Bowel Regimen Not Indicated  Once         09/05/23 1840 09/05/23 1841  Basic Metabolic Panel  Now Then Every 12 Hours       09/05/23 1840 09/05/23 1841  Magnesium  Daily       09/05/23 1840 09/05/23 1841  Phosphorus  Daily       09/05/23 1840 09/05/23 1840  Potassium Replacement - Follow Nurse / BPA Driven Protocol  As Needed         09/05/23 1840 09/05/23 1840  Magnesium Standard Dose Replacement - Follow Nurse / BPA Driven Protocol  As Needed         09/05/23 1840 09/05/23 1840  Phosphorus Replacement - Follow Nurse / BPA Driven Protocol  As Needed         09/05/23 1840 09/05/23 1840  Calcium Replacement - Follow Nurse / BPA Driven Protocol  As Needed         09/05/23 1840    09/05/23 1840  sodium chloride 0.9 % flush 10 mL  As Needed         09/05/23 1840 09/05/23 1840  sodium chloride 0.9 % infusion 40 mL  As Needed         09/05/23 1840 09/05/23 1840  Pharmacy to Dose enoxaparin (LOVENOX)  Continuous PRN         09/05/23 1840 09/05/23 1840  HYDROmorphone (DILAUDID) injection 0.5 mg  Every 2 Hours PRN         09/05/23 1840     09/05/23 1840  Pharmacy Consult - Calculate corrected sodium if level falls below 135  As Needed         09/05/23 1840    09/05/23 1840  dextrose (GLUTOSE) oral gel 15 g  Every 15 Minutes PRN         09/05/23 1840    09/05/23 1840  dextrose (D50W) (25 g/50 mL) IV injection 25 g  Every 15 Minutes PRN         09/05/23 1840    09/05/23 1840  glucagon HCl (Diagnostic) injection 1 mg  Every 15 Minutes PRN         09/05/23 1840    09/05/23 1832  HYDROmorphone (DILAUDID) injection 1 mg  Once         09/05/23 1816    09/05/23 1813  Inpatient Admission  Once         09/05/23 1820    09/05/23 1732  HYDROmorphone (DILAUDID) injection 1 mg  Once         09/05/23 1716    09/05/23 1732  prochlorperazine (COMPAZINE) injection 10 mg  Once         09/05/23 1716    09/05/23 1710  Triglycerides  STAT         09/05/23 1709    09/05/23 1558  US Gallbladder  1 Time Imaging         09/05/23 1557    09/05/23 1557  MRI abdomen wo contrast mrcp  1 Time Imaging         09/05/23 1557    09/05/23 1546  iopamidol (ISOVUE-300) 61 % injection 100 mL  Once in Imaging         09/05/23 1530    09/05/23 1524  HYDROmorphone (DILAUDID) injection 1 mg  Once         09/05/23 1508    09/05/23 1514  sodium chloride 0.9 % bolus 1,000 mL  Once         09/05/23 1458    09/05/23 1409  HYDROmorphone (DILAUDID) injection 1 mg  Once         09/05/23 1353    09/05/23 1409  ondansetron (ZOFRAN) injection 4 mg  Once         09/05/23 1353    09/05/23 1353  Scan Slide  Once,   Status:  Canceled         09/05/23 1352    09/05/23 1350  sodium chloride 0.9 % bolus 1,000 mL  Once         09/05/23 1334    09/05/23 1340  CT Abdomen Pelvis With Contrast  1 Time Imaging         09/05/23 1339    09/05/23 1335  Insert Peripheral IV  Once         09/05/23 1334    09/05/23 1335  [facility] Draw  Once         09/05/23 1334    09/05/23 1335  CBC & Differential  Once         09/05/23 1334    09/05/23 1335  Comprehensive Metabolic Panel  Once         09/05/23 1334    09/05/23 1335   Lipase  Once         09/05/23 1334    09/05/23 1335  Urinalysis With Microscopic If Indicated (No Culture) - Urine, Clean Catch  STAT         09/05/23 1334    09/05/23 1335  Lactic Acid, Plasma  Once         09/05/23 1334    09/05/23 1335  Insert Peripheral IV  Once        See Hyperspace for full Linked Orders Report.    09/05/23 1334    09/05/23 1335  Green Top (Gel)  PROCEDURE ONCE         09/05/23 1334    09/05/23 1335  Lavender Top  PROCEDURE ONCE         09/05/23 1334    09/05/23 1335  Gold Top - SST  PROCEDURE ONCE         09/05/23 1334    09/05/23 1335  Light Blue Top  PROCEDURE ONCE         09/05/23 1334    09/05/23 1335  CBC Auto Differential  PROCEDURE ONCE         09/05/23 1334    09/05/23 1334  sodium chloride 0.9 % flush 10 mL  As Needed        See Hyperspace for full Linked Orders Report.    09/05/23 1334    09/05/23 1334  sodium chloride 0.9 % flush 10 mL  As Needed         09/05/23 1334    Unscheduled  Follow Hypoglycemia Standing Orders For Blood Glucose <70 & Notify Provider of Treatment  As Needed      Comments: Follow Hypoglycemia Orders As Outlined in Process Instructions (Open Order Report to View Full Instructions)  Notify Provider Any Time Hypoglycemia Treatment is Administered    09/05/23 1840    Unscheduled  Change Dressing to IV Site As Needed When Damp, Loose or Soiled  As Needed       09/06/23 0801    Unscheduled  Change Needleless Connectors  As Needed      Comments: Change Needleless Connectors When:  - Administration Set Changed  - Dressing Changed  - Removed For Any Reason  - Residual Blood or Debris Within Connector  - Prior to Drawing Blood Cultures  - Contamination of Connector  - After Administration of Blood or Blood Components    09/06/23 0801    Unscheduled  Insert New Peripheral IV  As Needed      Comments: Frequency Per Facility Policy    09/06/23 0801    --  FLUoxetine (PROzac) 20 MG capsule  Daily         09/05/23 1349    --  hydrOXYzine pamoate (VISTARIL) 25 MG capsule   Nightly         09/05/23 1349    --  metoprolol tartrate (LOPRESSOR) 25 MG tablet  Daily         09/05/23 1349    --  atorvastatin (LIPITOR) 40 MG tablet  Nightly         09/05/23 1851    --  dapagliflozin Propanediol (Farxiga) 10 MG tablet  Daily,   Status:  Discontinued         09/05/23 1851    --  fenofibrate (TRICOR) 145 MG tablet  Nightly         09/05/23 1851    --  glipizide (GLUCOTROL XL) 10 MG 24 hr tablet  Daily         09/05/23 1851    --  aspirin 81 MG EC tablet         09/05/23 1851    --  empagliflozin (JARDIANCE) 10 MG tablet tablet  Daily         09/05/23 1956    --  SCANNED - TELEMETRY           09/05/23 0000    --  SCANNED - TELEMETRY           09/05/23 0000                  Physician Progress Notes (all)    No notes of this type exist for this encounter.       Consult Notes (all)    No notes of this type exist for this encounter.

## 2023-09-06 NOTE — PLAN OF CARE
Goal Outcome Evaluation:  Plan of Care Reviewed With: patient        Progress: no change  Outcome Evaluation: Pt AAOx4 on room air, running sinus tachycardia patient has insulin infusing along with D5 and sodium chloride 0.45%. Patient recieving hyperkalemia treatment this shift, patient has no concerns at this time. Bed in lowest positon and call light within reach.    Problem: Adult Inpatient Plan of Care  Goal: Plan of Care Review  9/6/2023 0425 by Marcio Tadeo RN  Outcome: Ongoing, Progressing  Flowsheets (Taken 9/6/2023 0425)  Progress: no change  9/6/2023 0422 by Marcio Tadeo RN  Outcome: Ongoing, Progressing  Flowsheets (Taken 9/6/2023 0419)  Outcome Evaluation: Pt AAOx4 on room air, running sinus tachycardia patient has insulin insulin infusing and D5 and sodium chloride 0.45% infusing  9/6/2023 0422 by Marcio Tadeo RN  Outcome: Ongoing, Progressing  Flowsheets (Taken 9/6/2023 0419)  Progress: no change  Plan of Care Reviewed With: patient  Outcome Evaluation: Pt AAOx4 on room air, running sinus tachycardia patient has insulin insulin infusing and D5 and sodium chloride 0.45% infusing          Problem: Adult Inpatient Plan of Care  Goal: Plan of Care Review  9/6/2023 0422 by Marcio Tadeo RN  Outcome: Ongoing, Progressing  Flowsheets (Taken 9/6/2023 0419)  Outcome Evaluation: Pt AAOx4 on room air, running sinus tachycardia patient has insulin insulin infusing and D5 and sodium chloride 0.45% infusing  9/6/2023 0422 by Marcio Tadeo RN  Outcome: Ongoing, Progressing  Flowsheets (Taken 9/6/2023 0419)  Progress: no change  Plan of Care Reviewed With: patient  Outcome Evaluation: Pt AAOx4 on room air, running sinus tachycardia patient has insulin insulin infusing and D5 and sodium chloride 0.45% infusing  Goal: Patient-Specific Goal (Individualized)  9/6/2023 0422 by Marcio Tadeo RN  Outcome: Ongoing, Progressing  9/6/2023 0422 by Marcio Tadeo RN  Outcome: Ongoing,  Progressing  Goal: Absence of Hospital-Acquired Illness or Injury  9/6/2023 0422 by Marcio Tadeo RN  Outcome: Ongoing, Progressing  9/6/2023 0422 by Marcio Tadeo RN  Outcome: Ongoing, Progressing  Intervention: Identify and Manage Fall Risk  Recent Flowsheet Documentation  Taken 9/6/2023 0300 by Marcio Tadeo RN  Safety Promotion/Fall Prevention: safety round/check completed  Taken 9/5/2023 2300 by Marcio Tadeo RN  Safety Promotion/Fall Prevention: safety round/check completed  Intervention: Prevent Skin Injury  Recent Flowsheet Documentation  Taken 9/6/2023 0200 by Marcio Tadeo RN  Skin Protection:   adhesive use limited   drying agents applied   protective footwear used   pulse oximeter probe site changed   skin-to-skin areas padded   skin-to-device areas padded   transparent dressing maintained   tubing/devices free from skin contact  Taken 9/5/2023 2000 by Marcio Tadeo RN  Skin Protection:   adhesive use limited   drying agents applied   incontinence pads utilized   protective footwear used   pulse oximeter probe site changed   tubing/devices free from skin contact   transparent dressing maintained  Intervention: Prevent and Manage VTE (Venous Thromboembolism) Risk  Recent Flowsheet Documentation  Taken 9/6/2023 0200 by Marcio Tadeo RN  VTE Prevention/Management: (See MAR) other (see comments)  Range of Motion: active ROM (range of motion) encouraged  Taken 9/5/2023 2000 by Marcio Tadeo RN  VTE Prevention/Management: (See MAR) other (see comments)  Range of Motion: active ROM (range of motion) encouraged  Intervention: Prevent Infection  Recent Flowsheet Documentation  Taken 9/6/2023 0300 by Marcio Tadeo RN  Infection Prevention:   single patient room provided   rest/sleep promoted  Taken 9/5/2023 2300 by Marcio Tadeo RN  Infection Prevention:   rest/sleep promoted   single patient room provided  Goal: Optimal Comfort and Wellbeing  9/6/2023 0422 by Shwetha  ROBEL Buckley  Outcome: Ongoing, Progressing  9/6/2023 0422 by Marcio Tadeo RN  Outcome: Ongoing, Progressing  Intervention: Provide Person-Centered Care  Recent Flowsheet Documentation  Taken 9/6/2023 0200 by Marcio Tadeo RN  Trust Relationship/Rapport:   care explained   thoughts/feelings acknowledged  Taken 9/5/2023 2000 by Marcio Tadeo, RN  Trust Relationship/Rapport:   care explained   choices provided   thoughts/feelings acknowledged   reassurance provided  Goal: Readiness for Transition of Care  9/6/2023 0422 by Marcio Tadeo RN  Outcome: Ongoing, Progressing  9/6/2023 0422 by Marcio Tadeo RN  Outcome: Ongoing, Progressing

## 2023-09-07 LAB
ANION GAP SERPL CALCULATED.3IONS-SCNC: 11.1 MMOL/L (ref 5–15)
ANION GAP SERPL CALCULATED.3IONS-SCNC: 7.8 MMOL/L (ref 5–15)
BUN SERPL-MCNC: 10 MG/DL (ref 6–20)
BUN SERPL-MCNC: 11 MG/DL (ref 6–20)
BUN/CREAT SERPL: 16.4 (ref 7–25)
BUN/CREAT SERPL: 19.3 (ref 7–25)
CALCIUM SPEC-SCNC: 8.5 MG/DL (ref 8.6–10.5)
CALCIUM SPEC-SCNC: 8.7 MG/DL (ref 8.6–10.5)
CHLORIDE SERPL-SCNC: 102 MMOL/L (ref 98–107)
CHLORIDE SERPL-SCNC: 103 MMOL/L (ref 98–107)
CO2 SERPL-SCNC: 20.9 MMOL/L (ref 22–29)
CO2 SERPL-SCNC: 26.2 MMOL/L (ref 22–29)
CREAT SERPL-MCNC: 0.57 MG/DL (ref 0.76–1.27)
CREAT SERPL-MCNC: 0.61 MG/DL (ref 0.76–1.27)
DEPRECATED RDW RBC AUTO: 42.5 FL (ref 37–54)
EGFRCR SERPLBLD CKD-EPI 2021: 114.1 ML/MIN/1.73
EGFRCR SERPLBLD CKD-EPI 2021: 116.5 ML/MIN/1.73
ERYTHROCYTE [DISTWIDTH] IN BLOOD BY AUTOMATED COUNT: 12.5 % (ref 12.3–15.4)
GLUCOSE BLDC GLUCOMTR-MCNC: 109 MG/DL (ref 70–130)
GLUCOSE BLDC GLUCOMTR-MCNC: 115 MG/DL (ref 70–130)
GLUCOSE BLDC GLUCOMTR-MCNC: 120 MG/DL (ref 70–130)
GLUCOSE BLDC GLUCOMTR-MCNC: 125 MG/DL (ref 70–130)
GLUCOSE BLDC GLUCOMTR-MCNC: 127 MG/DL (ref 70–130)
GLUCOSE BLDC GLUCOMTR-MCNC: 131 MG/DL (ref 70–130)
GLUCOSE BLDC GLUCOMTR-MCNC: 132 MG/DL (ref 70–130)
GLUCOSE BLDC GLUCOMTR-MCNC: 150 MG/DL (ref 70–130)
GLUCOSE BLDC GLUCOMTR-MCNC: 151 MG/DL (ref 70–130)
GLUCOSE BLDC GLUCOMTR-MCNC: 171 MG/DL (ref 70–130)
GLUCOSE BLDC GLUCOMTR-MCNC: 196 MG/DL (ref 70–130)
GLUCOSE BLDC GLUCOMTR-MCNC: 91 MG/DL (ref 70–130)
GLUCOSE SERPL-MCNC: 100 MG/DL (ref 65–99)
GLUCOSE SERPL-MCNC: 120 MG/DL (ref 65–99)
HCT VFR BLD AUTO: 36.4 % (ref 37.5–51)
HGB BLD-MCNC: 12.7 G/DL (ref 13–17.7)
MAGNESIUM SERPL-MCNC: 2 MG/DL (ref 1.6–2.6)
MCH RBC QN AUTO: 32.2 PG (ref 26.6–33)
MCHC RBC AUTO-ENTMCNC: 34.9 G/DL (ref 31.5–35.7)
MCV RBC AUTO: 92.4 FL (ref 79–97)
NT-PROBNP SERPL-MCNC: 54.3 PG/ML (ref 0–900)
PHOSPHATE SERPL-MCNC: 2 MG/DL (ref 2.5–4.5)
PHOSPHATE SERPL-MCNC: 2.1 MG/DL (ref 2.5–4.5)
PLATELET # BLD AUTO: 197 10*3/MM3 (ref 140–450)
PMV BLD AUTO: 9.8 FL (ref 6–12)
POTASSIUM SERPL-SCNC: 3.3 MMOL/L (ref 3.5–5.2)
POTASSIUM SERPL-SCNC: 4.3 MMOL/L (ref 3.5–5.2)
QT INTERVAL: 328 MS
QT INTERVAL: 332 MS
QTC INTERVAL: 435 MS
QTC INTERVAL: 469 MS
RBC # BLD AUTO: 3.94 10*6/MM3 (ref 4.14–5.8)
SODIUM SERPL-SCNC: 134 MMOL/L (ref 136–145)
SODIUM SERPL-SCNC: 137 MMOL/L (ref 136–145)
TRIGL SERPL-MCNC: 1048 MG/DL (ref 0–150)
TRIGL SERPL-MCNC: 756 MG/DL (ref 0–150)
WBC NRBC COR # BLD: 11.28 10*3/MM3 (ref 3.4–10.8)

## 2023-09-07 PROCEDURE — 83735 ASSAY OF MAGNESIUM: CPT | Performed by: STUDENT IN AN ORGANIZED HEALTH CARE EDUCATION/TRAINING PROGRAM

## 2023-09-07 PROCEDURE — 84100 ASSAY OF PHOSPHORUS: CPT | Performed by: STUDENT IN AN ORGANIZED HEALTH CARE EDUCATION/TRAINING PROGRAM

## 2023-09-07 PROCEDURE — 25010000002 PROCHLORPERAZINE 10 MG/2ML SOLUTION: Performed by: STUDENT IN AN ORGANIZED HEALTH CARE EDUCATION/TRAINING PROGRAM

## 2023-09-07 PROCEDURE — 25010000002 ENOXAPARIN PER 10 MG: Performed by: STUDENT IN AN ORGANIZED HEALTH CARE EDUCATION/TRAINING PROGRAM

## 2023-09-07 PROCEDURE — 84478 ASSAY OF TRIGLYCERIDES: CPT | Performed by: STUDENT IN AN ORGANIZED HEALTH CARE EDUCATION/TRAINING PROGRAM

## 2023-09-07 PROCEDURE — 83880 ASSAY OF NATRIURETIC PEPTIDE: CPT | Performed by: INTERNAL MEDICINE

## 2023-09-07 PROCEDURE — 82948 REAGENT STRIP/BLOOD GLUCOSE: CPT

## 2023-09-07 PROCEDURE — 80048 BASIC METABOLIC PNL TOTAL CA: CPT | Performed by: STUDENT IN AN ORGANIZED HEALTH CARE EDUCATION/TRAINING PROGRAM

## 2023-09-07 PROCEDURE — 85027 COMPLETE CBC AUTOMATED: CPT | Performed by: STUDENT IN AN ORGANIZED HEALTH CARE EDUCATION/TRAINING PROGRAM

## 2023-09-07 RX ORDER — POTASSIUM CHLORIDE 1.5 G/1.58G
40 POWDER, FOR SOLUTION ORAL EVERY 4 HOURS
Status: COMPLETED | OUTPATIENT
Start: 2023-09-07 | End: 2023-09-07

## 2023-09-07 RX ADMIN — DEXTROSE AND SODIUM CHLORIDE 150 ML/HR: 5; 450 INJECTION, SOLUTION INTRAVENOUS at 01:53

## 2023-09-07 RX ADMIN — METOPROLOL TARTRATE 25 MG: 25 TABLET, FILM COATED ORAL at 08:49

## 2023-09-07 RX ADMIN — ENOXAPARIN SODIUM 40 MG: 40 INJECTION SUBCUTANEOUS at 20:23

## 2023-09-07 RX ADMIN — Medication 10 ML: at 08:52

## 2023-09-07 RX ADMIN — DEXTROSE AND SODIUM CHLORIDE 150 ML/HR: 5; 450 INJECTION, SOLUTION INTRAVENOUS at 08:49

## 2023-09-07 RX ADMIN — INSULIN HUMAN 0.05 UNITS/KG/HR: 1 INJECTION, SOLUTION INTRAVENOUS at 09:00

## 2023-09-07 RX ADMIN — Medication 10 ML: at 20:23

## 2023-09-07 RX ADMIN — HYDROXYZINE HYDROCHLORIDE 50 MG: 50 TABLET ORAL at 20:22

## 2023-09-07 RX ADMIN — POTASSIUM CHLORIDE 40 MEQ: 1.5 POWDER, FOR SOLUTION ORAL at 12:48

## 2023-09-07 RX ADMIN — POTASSIUM & SODIUM PHOSPHATES POWDER PACK 280-160-250 MG 2 PACKET: 280-160-250 PACK at 08:49

## 2023-09-07 RX ADMIN — Medication 10 ML: at 08:51

## 2023-09-07 RX ADMIN — FLUOXETINE HYDROCHLORIDE 20 MG: 20 CAPSULE ORAL at 08:49

## 2023-09-07 RX ADMIN — DEXTROSE AND SODIUM CHLORIDE 150 ML/HR: 5; 450 INJECTION, SOLUTION INTRAVENOUS at 15:27

## 2023-09-07 RX ADMIN — DEXTROSE AND SODIUM CHLORIDE 150 ML/HR: 5; 450 INJECTION, SOLUTION INTRAVENOUS at 22:39

## 2023-09-07 RX ADMIN — POTASSIUM CHLORIDE 40 MEQ: 1.5 POWDER, FOR SOLUTION ORAL at 08:49

## 2023-09-07 RX ADMIN — PROCHLORPERAZINE EDISYLATE 5 MG: 5 INJECTION INTRAMUSCULAR; INTRAVENOUS at 09:00

## 2023-09-07 NOTE — PLAN OF CARE
Problem: Adult Inpatient Plan of Care  Goal: Plan of Care Review  Outcome: Ongoing, Not Progressing  Flowsheets (Taken 9/7/2023 1721)  Progress: improving  Plan of Care Reviewed With: patient  Outcome Evaluation: Pt A&Ox4. RA. Diet advanced today. Phos and K replaced. Pt ambulated this shift. Wife remains at bedside. Triglycerides improving. Pt up in chair verbalizing no new complaints at this time. Call light within reach. Bed locked and in lowest position.  Goal: Patient-Specific Goal (Individualized)  Outcome: Ongoing, Not Progressing  Goal: Absence of Hospital-Acquired Illness or Injury  Outcome: Ongoing, Not Progressing  Intervention: Identify and Manage Fall Risk  Recent Flowsheet Documentation  Taken 9/7/2023 1500 by Yris Thomas RN  Safety Promotion/Fall Prevention: safety round/check completed  Taken 9/7/2023 1300 by Yris Thomas RN  Safety Promotion/Fall Prevention: safety round/check completed  Taken 9/7/2023 1100 by Yris Thomas RN  Safety Promotion/Fall Prevention: safety round/check completed  Taken 9/7/2023 0900 by Yris Thomas RN  Safety Promotion/Fall Prevention: safety round/check completed  Taken 9/7/2023 0700 by Yris Thomas RN  Safety Promotion/Fall Prevention: safety round/check completed  Intervention: Prevent Skin Injury  Recent Flowsheet Documentation  Taken 9/7/2023 1400 by Yris Thomas RN  Skin Protection:   adhesive use limited   incontinence pads utilized   skin-to-device areas padded   skin-to-skin areas padded   tubing/devices free from skin contact  Taken 9/7/2023 0800 by Yris Thomas RN  Skin Protection:   adhesive use limited   incontinence pads utilized  Intervention: Prevent and Manage VTE (Venous Thromboembolism) Risk  Recent Flowsheet Documentation  Taken 9/7/2023 1400 by Yris Thomas RN  VTE Prevention/Management: (lovenox) other (see comments)  Range of Motion: active ROM (range of motion) encouraged  Taken 9/7/2023 0815 by Yris Thomas  RN  Activity Management:   ambulated in room   up in chair  Taken 9/7/2023 0800 by Yris Thomas RN  VTE Prevention/Management: (subq lovenox) other (see comments)  Range of Motion: active ROM (range of motion) encouraged  Intervention: Prevent Infection  Recent Flowsheet Documentation  Taken 9/7/2023 1500 by Yris Thomas RN  Infection Prevention:   rest/sleep promoted   single patient room provided  Taken 9/7/2023 1300 by Yris Thomas RN  Infection Prevention:   rest/sleep promoted   single patient room provided  Taken 9/7/2023 1100 by Yris Thomas RN  Infection Prevention:   rest/sleep promoted   single patient room provided  Taken 9/7/2023 0900 by Yris Thomas RN  Infection Prevention:   rest/sleep promoted   single patient room provided  Taken 9/7/2023 0700 by Yris Thomas RN  Infection Prevention:   single patient room provided   rest/sleep promoted  Goal: Optimal Comfort and Wellbeing  Outcome: Ongoing, Not Progressing  Intervention: Provide Person-Centered Care  Recent Flowsheet Documentation  Taken 9/7/2023 1400 by Yris Thomas RN  Trust Relationship/Rapport:   care explained   choices provided   empathic listening provided   emotional support provided   questions answered   questions encouraged   reassurance provided   thoughts/feelings acknowledged  Taken 9/7/2023 0800 by Yris Thomas RN  Trust Relationship/Rapport:   care explained   choices provided   emotional support provided   empathic listening provided   questions answered   questions encouraged   thoughts/feelings acknowledged   reassurance provided  Goal: Readiness for Transition of Care  Outcome: Ongoing, Not Progressing     Problem: Adjustment to Illness (Sepsis/Septic Shock)  Goal: Optimal Coping  Outcome: Ongoing, Not Progressing  Intervention: Optimize Psychosocial Adjustment to Illness  Recent Flowsheet Documentation  Taken 9/7/2023 1400 by Yris Thomas RN  Supportive Measures:   active listening utilized    relaxation techniques promoted  Family/Support System Care: support provided  Taken 9/7/2023 0800 by Yris Thomas RN  Supportive Measures:   active listening utilized   relaxation techniques promoted  Family/Support System Care: support provided     Problem: Bleeding (Sepsis/Septic Shock)  Goal: Absence of Bleeding  Outcome: Ongoing, Not Progressing     Problem: Glycemic Control Impaired (Sepsis/Septic Shock)  Goal: Blood Glucose Level Within Desired Range  Outcome: Ongoing, Not Progressing  Intervention: Optimize Glycemic Control  Recent Flowsheet Documentation  Taken 9/7/2023 1400 by Yris Thomas RN  Glycemic Management: blood glucose monitored  Taken 9/7/2023 0800 by Yris Thomas RN  Glycemic Management: blood glucose monitored     Problem: Infection Progression (Sepsis/Septic Shock)  Goal: Absence of Infection Signs and Symptoms  Outcome: Ongoing, Not Progressing  Intervention: Initiate Sepsis Management  Recent Flowsheet Documentation  Taken 9/7/2023 1500 by Yris Thomas RN  Infection Prevention:   rest/sleep promoted   single patient room provided  Taken 9/7/2023 1300 by Yris Thomas RN  Infection Prevention:   rest/sleep promoted   single patient room provided  Taken 9/7/2023 1100 by Yris Thomas RN  Infection Prevention:   rest/sleep promoted   single patient room provided  Taken 9/7/2023 0900 by Yris Thomas RN  Infection Prevention:   rest/sleep promoted   single patient room provided  Taken 9/7/2023 0700 by Yris Thomas RN  Infection Prevention:   single patient room provided   rest/sleep promoted  Intervention: Promote Recovery  Recent Flowsheet Documentation  Taken 9/7/2023 1400 by Yris Thomas RN  Sleep/Rest Enhancement:   awakenings minimized   regular sleep/rest pattern promoted   relaxation techniques promoted  Taken 9/7/2023 0815 by Yris Thomas RN  Activity Management:   ambulated in room   up in chair  Taken 9/7/2023 0800 by Yris Thomas RN  Sleep/Rest  Enhancement:   awakenings minimized   regular sleep/rest pattern promoted   relaxation techniques promoted     Problem: Nutrition Impaired (Sepsis/Septic Shock)  Goal: Optimal Nutrition Intake  Outcome: Ongoing, Not Progressing   Goal Outcome Evaluation:  Plan of Care Reviewed With: patient        Progress: improving  Outcome Evaluation: Pt A&Ox4. RA. Diet advanced today. Phos and K replaced. Pt ambulated this shift. Wife remains at bedside. Triglycerides improving. Pt up in chair verbalizing no new complaints at this time. Call light within reach. Bed locked and in lowest position.

## 2023-09-07 NOTE — PROGRESS NOTES
[facility] HOSPITALIST PROGRESS NOTE     Patient Identification:  Name:  Herminio Matson  Age:  52 y.o.  Sex:  male  :  1970  MRN:  [id number]  Visit Number:  [id number]  ROOM: [place] [address]     Primary Care Provider:  Zain     Length of stay in inpatient status:  2    Subjective     Chief Compliant:    Chief Complaint   Patient presents with    Abdominal Pain       History of Presenting Illness: Patient seen and evaluated in follow-up for hypertriglyceridemia induced pancreatitis.  Patient this morning having near resolution of nausea and denies any further emesis.  Triglycerides have improved into the 's.  Patient initiate advancing diet and will advance to a more solid food diet.    Objective     Current Hospital Meds:  enoxaparin, 40 mg, Subcutaneous, Nightly  FLUoxetine, 20 mg, Oral, Daily  hydrOXYzine, 50 mg, Oral, Nightly  metoprolol tartrate, 25 mg, Oral, Daily  sodium chloride, 10 mL, Intravenous, Q12H  sodium chloride, 10 mL, Intravenous, Q12H      dextrose 5 % and sodium chloride 0.45 %, 150 mL/hr, Last Rate: 150 mL/hr (23 1527)  insulin, 0.05 Units/kg/hr, Last Rate: 0.05 Units/kg/hr (23 0900)  Pharmacy to Dose enoxaparin (LOVENOX),       ----------------------------------------------------------------------------------------------------------------------  Vital Signs:  Temp:  [98.2 °F (36.8 °C)-98.9 °F (37.2 °C)] 98.2 °F (36.8 °C)  Heart Rate:  [] 77  Resp:  [12-24] 14  BP: (106-142)/(63-80) 113/67  SpO2:  [92 %-100 %] 98 %  on   ;   Device (Oxygen Therapy): room air  Body mass index is 28.26 kg/m².      Intake/Output Summary (Last 24 hours) at 2023 1639  Last data filed at 2023 1200  Gross per 24 hour   Intake 4375.89 ml   Output --   Net 4375.89 ml      ----------------------------------------------------------------------------------------------------------------------  Physical exam:  Constitutional:  Well-developed and well-nourished.   No respiratory distress.      HENT:  Head: Normocephalic and atraumatic.  Mouth:  Moist mucous membranes.    Eyes:  Conjunctivae and EOM are normal.  Pupils are equal, round, and reactive to light.  No scleral icterus.  Neck:  Neck supple.  No JVD present.    Cardiovascular:  Normal rate, regular rhythm and normal heart sounds with no murmur.  Pulmonary/Chest:  No respiratory distress, no wheezes, no crackles, with normal breath sounds and good air movement.  Abdominal:  Soft.  Bowel sounds are normal.  No distention but there is tenderness to palpation in the epigastric region but improved from day prior.   Musculoskeletal:  No tenderness and no deformity.  No red or swollen joints anywhere.    Neurological: Slightly drowsy but oriented to person, place, and time.  No cranial nerve deficit.  No tongue deviation.  No facial droop.  No slurred speech.   Skin:  Skin is warm and dry.  No rash noted.  No pallor.   Peripheral vascular:  No edema and pulses on all 4 extremities.  ----------------------------------------------------------------------------------------------------------------------  WBC/HGB/HCT/PLT   11.28/12.7/36.4/197 (09/07 0648)  BUN/CREAT/GLUC/ALT/AST/NADYA/LIP    11/0.57/120/--/--/--/-- (09/07 0648)  LYTES - Na/K/Cl/CO2: 137/3.3*/103/26.2 (09/07 0648)        No results found for: URINECX  No results found for: BLOODCX    I have personally looked at the labs and they are summarized above.  ----------------------------------------------------------------------------------------------------------------------  Detailed radiology reports for the last 24 hours:  MRI abdomen wo contrast mrcp    Result Date: 9/5/2023   1.  Cholelithiasis. 2.  No features of choledocholithiasis. 3.  No features of cholecystitis. 4.  Mild stranding around the pancreas and duodenum suggestive of underlying mild acute pancreatitis. 5.  Contribution from mild duodenitis is not excluded. 6.  No acute process seen in the liver,  spleen, adrenal glands, and kidneys. 7.  Very small parapelvic renal cysts. 8.  Minimal nonspecific stranding around the right and left kidney.  This report was finalized on 9/5/2023 8:21 PM by Mukund Brennan MD.       Assessment & Plan      Acute pancreatitis  Severe hypertriglyceridemia  Intractable nausea and vomiting    -Patient presenting with nausea and vomiting associated with acute pancreatitis in the setting of markedly elevated triglycerides greater than 4000.  Patient with a past medical history significant for hypertriglyceridemia and previous induced pancreatitis in the past last episode 3 years prior.    -Patient admits that he has been out of his fibrate until a week ago for approximately 2 to 3 weeks large suspicion that this is a contributing factor.    -Initiate patient on D5 half-normal saline for volume expansion in the setting of pancreatitis as well as need for glucose administration while initiating insulin drip for hypertriglyceridemia.  Monitor whether or not continued need of D5 half-normal saline with resuming patient's oral intake.    -Triglycerides every 12 hours until triglycerides less than 500 at which point we will turn off insulin drip and transition to oral statin and fibrate    -We will resume any of patient's home medications as appropriate.    -Lipase greater than 3000 with typical abdominal pain and imaging findings consistent with pancreatitis.    -Advance to solid food fat restricted consistent carb diet.     Hyponatremia, resolved    -Patient with corrected sodium of 129 initially at presentation now improved to 137 with volume resuscitation.     Diabetes mellitus type 2    -Patient currently on insulin drip for treatment of hypertriglyceridemia as above, will monitor glucose closely and adjust insulin drip as needed.    -Every 2 hour glucose checks while on insulin drip.     Hypertension    -Resume home antihypertensives as appropriate    Copied text in portions of the note  has been reviewed and is accurate as of 09/07/23    VTE Prophylaxis:   Mechanical Order History:       None          Pharmalogical Order History:        Ordered     Dose Route Frequency Stop    09/05/23 1842  Enoxaparin Sodium (LOVENOX) syringe 40 mg         40 mg SC Nightly --    09/05/23 1840  Pharmacy to Dose enoxaparin (LOVENOX)        Question:  Indication of use  Answer:  Prophylaxis    -- XX Continuous PRN --                    Disposition Home once hypertriglyceridemia improved and resolved and patient tolerating oral intake with regular diet.    Deangelo Thomason DO  [facility] Hospitalist  09/07/23  16:39 EDT

## 2023-09-07 NOTE — PLAN OF CARE
Goal Outcome Evaluation:  Plan of Care Reviewed With: patient        Progress: improving  Outcome Evaluation: Patient vital signs currently stable. He is on room air and tolerating it well. Continuous insulin gtt still infusing. Continuous IVF also infusing. Patient has ambulated inside room on multiple occassions this shift. Glucose checked q2hrs. Otherwise, no significant changes noted.

## 2023-09-08 ENCOUNTER — APPOINTMENT (OUTPATIENT)
Dept: ULTRASOUND IMAGING | Facility: HOSPITAL | Age: 53
DRG: 439 | End: 2023-09-08
Payer: COMMERCIAL

## 2023-09-08 VITALS
SYSTOLIC BLOOD PRESSURE: 131 MMHG | HEART RATE: 69 BPM | HEIGHT: 72 IN | OXYGEN SATURATION: 91 % | DIASTOLIC BLOOD PRESSURE: 81 MMHG | TEMPERATURE: 99 F | WEIGHT: 210.76 LBS | BODY MASS INDEX: 28.55 KG/M2 | RESPIRATION RATE: 15 BRPM

## 2023-09-08 LAB
ANION GAP SERPL CALCULATED.3IONS-SCNC: 7.9 MMOL/L (ref 5–15)
BUN SERPL-MCNC: 8 MG/DL (ref 6–20)
BUN/CREAT SERPL: 12.5 (ref 7–25)
CALCIUM SPEC-SCNC: 8.9 MG/DL (ref 8.6–10.5)
CHLORIDE SERPL-SCNC: 103 MMOL/L (ref 98–107)
CO2 SERPL-SCNC: 25.1 MMOL/L (ref 22–29)
CREAT SERPL-MCNC: 0.64 MG/DL (ref 0.76–1.27)
EGFRCR SERPLBLD CKD-EPI 2021: 112.5 ML/MIN/1.73
GLUCOSE BLDC GLUCOMTR-MCNC: 144 MG/DL (ref 70–130)
GLUCOSE BLDC GLUCOMTR-MCNC: 144 MG/DL (ref 70–130)
GLUCOSE BLDC GLUCOMTR-MCNC: 152 MG/DL (ref 70–130)
GLUCOSE BLDC GLUCOMTR-MCNC: 91 MG/DL (ref 70–130)
GLUCOSE BLDC GLUCOMTR-MCNC: 95 MG/DL (ref 70–130)
GLUCOSE BLDC GLUCOMTR-MCNC: 99 MG/DL (ref 70–130)
GLUCOSE SERPL-MCNC: 95 MG/DL (ref 65–99)
POTASSIUM SERPL-SCNC: 3.7 MMOL/L (ref 3.5–5.2)
SODIUM SERPL-SCNC: 136 MMOL/L (ref 136–145)
TRIGL SERPL-MCNC: 662 MG/DL (ref 0–150)

## 2023-09-08 PROCEDURE — 84478 ASSAY OF TRIGLYCERIDES: CPT | Performed by: STUDENT IN AN ORGANIZED HEALTH CARE EDUCATION/TRAINING PROGRAM

## 2023-09-08 PROCEDURE — 94761 N-INVAS EAR/PLS OXIMETRY MLT: CPT

## 2023-09-08 PROCEDURE — 94799 UNLISTED PULMONARY SVC/PX: CPT

## 2023-09-08 PROCEDURE — 93971 EXTREMITY STUDY: CPT

## 2023-09-08 PROCEDURE — 80048 BASIC METABOLIC PNL TOTAL CA: CPT | Performed by: STUDENT IN AN ORGANIZED HEALTH CARE EDUCATION/TRAINING PROGRAM

## 2023-09-08 PROCEDURE — 82948 REAGENT STRIP/BLOOD GLUCOSE: CPT

## 2023-09-08 RX ORDER — ATORVASTATIN CALCIUM 40 MG/1
40 TABLET, FILM COATED ORAL NIGHTLY
Status: DISCONTINUED | OUTPATIENT
Start: 2023-09-08 | End: 2023-09-08 | Stop reason: HOSPADM

## 2023-09-08 RX ORDER — CHOLECALCIFEROL (VITAMIN D3) 125 MCG
10 CAPSULE ORAL NIGHTLY PRN
Status: DISCONTINUED | OUTPATIENT
Start: 2023-09-08 | End: 2023-09-08 | Stop reason: HOSPADM

## 2023-09-08 RX ORDER — BLOOD SUGAR DIAGNOSTIC
STRIP MISCELLANEOUS
Qty: 100 EACH | Refills: 12 | Status: SHIPPED | OUTPATIENT
Start: 2023-09-08

## 2023-09-08 RX ORDER — BLOOD SUGAR DIAGNOSTIC
STRIP MISCELLANEOUS
Qty: 100 EACH | Refills: 12 | Status: SHIPPED | OUTPATIENT
Start: 2023-09-08 | End: 2023-09-08 | Stop reason: SDUPTHER

## 2023-09-08 RX ORDER — LANCETS 30 GAUGE
EACH MISCELLANEOUS
Qty: 100 EACH | Refills: 12 | Status: SHIPPED | OUTPATIENT
Start: 2023-09-08 | End: 2023-09-08 | Stop reason: SDUPTHER

## 2023-09-08 RX ORDER — LANCETS 30 GAUGE
EACH MISCELLANEOUS
Qty: 100 EACH | Refills: 12 | Status: SHIPPED | OUTPATIENT
Start: 2023-09-08

## 2023-09-08 RX ORDER — FENOFIBRATE 145 MG/1
145 TABLET, COATED ORAL NIGHTLY
Status: DISCONTINUED | OUTPATIENT
Start: 2023-09-08 | End: 2023-09-08 | Stop reason: HOSPADM

## 2023-09-08 RX ORDER — ASPIRIN 81 MG/1
81 TABLET ORAL DAILY
Status: DISCONTINUED | OUTPATIENT
Start: 2023-09-08 | End: 2023-09-08 | Stop reason: HOSPADM

## 2023-09-08 RX ADMIN — Medication 10 ML: at 08:32

## 2023-09-08 RX ADMIN — DEXTROSE AND SODIUM CHLORIDE 150 ML/HR: 5; 450 INJECTION, SOLUTION INTRAVENOUS at 06:23

## 2023-09-08 RX ADMIN — Medication 10 MG: at 02:48

## 2023-09-08 RX ADMIN — FLUOXETINE HYDROCHLORIDE 20 MG: 20 CAPSULE ORAL at 08:31

## 2023-09-08 RX ADMIN — METOPROLOL TARTRATE 25 MG: 25 TABLET, FILM COATED ORAL at 08:31

## 2023-09-08 RX ADMIN — INSULIN HUMAN 4.76 UNITS/KG/HR: 1 INJECTION, SOLUTION INTRAVENOUS at 02:10

## 2023-09-08 RX ADMIN — ASPIRIN 81 MG: 81 TABLET, COATED ORAL at 09:00

## 2023-09-08 NOTE — CASE MANAGEMENT/SOCIAL WORK
Case Management Discharge Note      Final Note: Patient is being discharged home on this date 9/8/23.  No needs identified.         Selected Continued Care - Admitted Since 9/5/2023               Final Discharge Disposition Code: 01 - home or self-care

## 2023-09-08 NOTE — PLAN OF CARE
Goal Outcome Evaluation:  Plan of Care Reviewed With: patient        Progress: improving  Outcome Evaluation: Patient vital signs currently stable. He remains on room air and tolerating it well. Continuous IVF and insulin gtt still infusing. Triglycerides are improving but still >500. He has ambulated int he room on multiple occassions. PRN medication given to help with sleep. Otherwise, no significant changes noted.

## 2023-09-09 NOTE — DISCHARGE SUMMARY
[facility] HOSPITALISTS DISCHARGE SUMMARY    Patient Identification:  Name:  Herminio Matson  Age:  52 y.o.  Sex:  male  :  1970  MRN:  [id number]  Visit Number:  [id number]    Date of Admission: 2023  Date of Discharge:  2023    PCP: Zain N    DISCHARGE DIAGNOSIS  Acute pancreatitis  Severe hypertriglyceridemia  Intractable nausea and vomiting  Hyponatremia, resolved  Diabetes mellitus type 2  Hypertension    CONSULTS   None    PROCEDURES PERFORMED  None    HOSPITAL COURSE  Patient is a 52 y.o. male presented to [facility] complaining of nausea and vomiting with epigastric pain early this morning. Patient has a past medical history significant for hypertriglyceridemia with prior episode of pancreatitis on fibrate and statin, hypertension, and diabetes mellitus type 2. Patient states that symptoms began earlier the morning of admission. Patient reported inability to keep down oral food today hence he came to the hospital for further evaluation. Patient did report past history of prior hospitalization for pancreatitis secondary to hypertriglyceridemia approximately 3 years ago in [place]. When asked if he is missed or run out of any of his cholesterol/lipid-lowering medications patient he did report that up until last week when he followed up with his PCP he had been without his fibrate for several weeks. When asked if repeat lab work was obtained at his last PCP visit he does report that his triglycerides reported to be in the 6000's he thinks and his wife was unaware of this. Patient's triglycerides on day of admission and the emergency room elevated at 4400. Patient states that he feels a little bit drowsy/sluggish but otherwise denies any other acute complaints.  Patient was subsequently admitted to the hospitalist service and started on insulin drip and placed on a clear liquid diet.  Patient was able to slowly advance diet back to regular and kept  on insulin drip until triglycerides had significantly improved.  On day of discharge patient's triglycerides had improved from  greater than 4425-6 62 with steady decrease on every 12 hour checks as he was maintaining oral intake he was transition back to oral fibrates and statin and patient taken off insulin drip.  He was monitored off insulin drip for several hours to ensure no developing hypoglycemia and he did not have any.  He did have some tenderness to the right forearm above recent IV site and duplex obtained showed noncompressible right cephalic vein suggestive of superficial thrombus.  Given superficial nature no anticoagulation is indicated and patient could proceed with conservative management with NSAIDs and warm compresses.  As such patient was felt to have achieved maximal innovate of continued hospitalization and discharged back home in stable medical condition with implicit instructions on not missing any fibrate or statin therapy.  Of note patient will follow-up with his PCP and would likely benefit from referral to endocrinology to follow along given his significant hypertriglyceridemia and history of pancreatitis secondary to this.    VITAL SIGNS:        on   ;        Body mass index is 28.58 kg/m².  Wt Readings from Last 3 Encounters:   09/08/23 95.6 kg (210 lb 12.2 oz)       PHYSICAL EXAM:  Constitutional:  Well-developed and well-nourished.  No respiratory distress.      HENT:  Head: Normocephalic and atraumatic.  Mouth:  Moist mucous membranes.    Eyes:  Conjunctivae and EOM are normal.  Pupils are equal, round, and reactive to light.  No scleral icterus.  Neck:  Neck supple.  No JVD present.    Cardiovascular:  Normal rate, regular rhythm and normal heart sounds with no murmur.  Pulmonary/Chest:  No respiratory distress, no wheezes, no crackles, with normal breath sounds and good air movement.  Abdominal:  Soft.  Bowel sounds are normal.  No distention and no tenderness to palpation.     Musculoskeletal: There is tenderness to palpation above the right AC..  No red or swollen joints anywhere.    Neurological: Alert and oriented to person, place, and time.  No cranial nerve deficit.  No tongue deviation.  No facial droop.  No slurred speech.   Skin:  Skin is warm and dry.  No rash noted.  No pallor.   Peripheral vascular:  No edema and pulses on all 4 extremities.    DISCHARGE DISPOSITION   Stable    DISCHARGE MEDICATIONS:     Discharge Medications        New Medications        Instructions Start Date   Alcohol Pads 70 % pads   Apply one alcohol swab to injection site of skin immediately prior to insulin injection. Formulary Compliance Approval.      glucose blood test strip   Check blood glucose twice daily once in AM and once in PM and keep log.      Lancets misc   Use to test blood glucose up to four times daily as needed. Formulary Compliance Approval. Diagnosis: Type 2 Diabetes - Not Insulin Dependent             Continue These Medications        Instructions Start Date   aspirin 81 MG EC tablet   Aspir-81 mg tablet,delayed release   Take 1 tablet every day by oral route.      atorvastatin 40 MG tablet  Commonly known as: LIPITOR   40 mg, Oral, Nightly      empagliflozin 10 MG tablet tablet  Commonly known as: JARDIANCE   10 mg, Oral, Daily      fenofibrate 145 MG tablet  Commonly known as: TRICOR   145 mg, Oral, Nightly      FLUoxetine 20 MG capsule  Commonly known as: PROzac   20 mg, Oral, Daily      glipizide 10 MG 24 hr tablet  Commonly known as: GLUCOTROL XL   10 mg, Oral, Daily      hydrOXYzine pamoate 25 MG capsule  Commonly known as: VISTARIL   50 mg, Oral, Nightly      metoprolol tartrate 25 MG tablet  Commonly known as: LOPRESSOR   12.5 mg, Oral, Daily                 Additional Instructions for the Follow-ups that You Need to Schedule       Discharge Follow-up with PCP   As directed       Currently Documented PCP:    Zain April, APRN    PCP Phone Number:    [phone]      Follow Up Details: 1 week post hospital follow up, repeat check of triglycerides               Follow-up Information       Pittman, April, APRN .    Specialty: Nurse Practitioner  Why: 1 week post hospital follow up, repeat check of triglycerides  Contact information:  [address] [place] [address]  [phone]               Pittman, April, APRN .    Specialty: Nurse Practitioner  Contact information:  [address] [place] [address]  [phone]                              TEST  RESULTS PENDING AT DISCHARGE  Pending Labs       Order Current Status    Blood Culture - Blood, Arm, Left Preliminary result    Blood Culture - Blood, Hand, Right Preliminary result             The ASCVD Risk score (Yennifer ASTORGA, et al., 2019) failed to calculate for the following reasons:    Cannot find a previous HDL lab    Cannot find a previous total cholesterol lab     CODE STATUS  Code Status and Medical Interventions:   Ordered at: 09/05/23 2308     Code Status (Patient has no pulse and is not breathing):    CPR (Attempt to Resuscitate)     Medical Interventions (Patient has pulse or is breathing):    Full Support       Deangelo Thomason DO  [facility]  09/09/23  19:04 EDT    Please note that this discharge summary required more than 30 minutes to complete.

## 2023-09-10 NOTE — PAYOR COMM NOTE
"[facility]  NATALIA RIOS  PHONE  [phone]  FAX  [phone]  NPI:  [id number]    PATIENT D/C 2023    Kylie Matson (52 y.o. Male)       Date of Birth   1970    Social Security Number       Address   [address] [place] [id number]    Home Phone   [phone]    MRN   [id number]       Latter day   Non-Spiritism    Marital Status                               Admission Date   23    Admission Type   Emergency    Admitting Provider   Deangelo Thomason DO    Attending Provider       Department, Room/Bed   [facility] PROGRESS CARE, P203/S2       Discharge Date   2023    Discharge Disposition   Home or Self Care    Discharge Destination   Home                              Attending Provider: (none)   Allergies: No Known Allergies    Isolation: None   Infection: None   Code Status: Prior    Ht: 182.9 cm (72\")   Wt: 95.6 kg (210 lb 12.2 oz)    Admission Cmt: None   Principal Problem: Pancreatitis [K85.90]                   Active Insurance as of 2023       Primary Coverage       Payor Plan Insurance Group Employer/Plan Group    [employer]UE [employer] PPO [id number]       Payor Plan Address Payor Plan Phone Number Payor Plan Fax Number Effective Dates    [address] [phone]  2023 - None Entered    [facility] [id number]         Subscriber Name Subscriber Birth Date Member ID       KYLIE MATSON 1970 [id number]                     Emergency Contacts        (Rel.) Home Phone Work Phone Mobile Phone    NIKKI MATSON (Spouse) [phone] -- [phone]                 Discharge Summary        Deangelo Thomason DO at 23 1247              [facility] HOSPITALISTS DISCHARGE SUMMARY    Patient Identification:  Name:  Kylie Matson  Age:  52 y.o.  Sex:  male  :  1970  MRN:  [id number]  Visit Number:  [id number]    Date of Admission: 2023  Date of Discharge:  " 9/8/2023    PCP: Zain April, APRN    DISCHARGE DIAGNOSIS  Acute pancreatitis  Severe hypertriglyceridemia  Intractable nausea and vomiting  Hyponatremia, resolved  Diabetes mellitus type 2  Hypertension    CONSULTS   None    PROCEDURES PERFORMED  None    HOSPITAL COURSE  Patient is a 52 y.o. male presented to [facility] complaining of nausea and vomiting with epigastric pain early this morning. Patient has a past medical history significant for hypertriglyceridemia with prior episode of pancreatitis on fibrate and statin, hypertension, and diabetes mellitus type 2. Patient states that symptoms began earlier the morning of admission. Patient reported inability to keep down oral food today hence he came to the hospital for further evaluation. Patient did report past history of prior hospitalization for pancreatitis secondary to hypertriglyceridemia approximately 3 years ago in [place]. When asked if he is missed or run out of any of his cholesterol/lipid-lowering medications patient he did report that up until last week when he followed up with his PCP he had been without his fibrate for several weeks. When asked if repeat lab work was obtained at his last PCP visit he does report that his triglycerides reported to be in the 6000's he thinks and his wife was unaware of this. Patient's triglycerides on day of admission and the emergency room elevated at 4400. Patient states that he feels a little bit drowsy/sluggish but otherwise denies any other acute complaints.  Patient was subsequently admitted to the hospitalist service and started on insulin drip and placed on a clear liquid diet.  Patient was able to slowly advance diet back to regular and kept on insulin drip until triglycerides had significantly improved.  On day of discharge patient's triglycerides had improved from  greater than 4425-6 62 with steady decrease on every 12 hour checks as he was maintaining oral intake he was transition  back to oral fibrates and statin and patient taken off insulin drip.  He was monitored off insulin drip for several hours to ensure no developing hypoglycemia and he did not have any.  He did have some tenderness to the right forearm above recent IV site and duplex obtained showed noncompressible right cephalic vein suggestive of superficial thrombus.  Given superficial nature no anticoagulation is indicated and patient could proceed with conservative management with NSAIDs and warm compresses.  As such patient was felt to have achieved maximal innovate of continued hospitalization and discharged back home in stable medical condition with implicit instructions on not missing any fibrate or statin therapy.  Of note patient will follow-up with his PCP and would likely benefit from referral to endocrinology to follow along given his significant hypertriglyceridemia and history of pancreatitis secondary to this.    VITAL SIGNS:        on   ;        Body mass index is 28.58 kg/m².  Wt Readings from Last 3 Encounters:   09/08/23 95.6 kg (210 lb 12.2 oz)       PHYSICAL EXAM:  Constitutional:  Well-developed and well-nourished.  No respiratory distress.      HENT:  Head: Normocephalic and atraumatic.  Mouth:  Moist mucous membranes.    Eyes:  Conjunctivae and EOM are normal.  Pupils are equal, round, and reactive to light.  No scleral icterus.  Neck:  Neck supple.  No JVD present.    Cardiovascular:  Normal rate, regular rhythm and normal heart sounds with no murmur.  Pulmonary/Chest:  No respiratory distress, no wheezes, no crackles, with normal breath sounds and good air movement.  Abdominal:  Soft.  Bowel sounds are normal.  No distention and no tenderness to palpation.    Musculoskeletal: There is tenderness to palpation above the right AC..  No red or swollen joints anywhere.    Neurological: Alert and oriented to person, place, and time.  No cranial nerve deficit.  No tongue deviation.  No facial droop.  No slurred  speech.   Skin:  Skin is warm and dry.  No rash noted.  No pallor.   Peripheral vascular:  No edema and pulses on all 4 extremities.    DISCHARGE DISPOSITION   Stable    DISCHARGE MEDICATIONS:     Discharge Medications        New Medications        Instructions Start Date   Alcohol Pads 70 % pads   Apply one alcohol swab to injection site of skin immediately prior to insulin injection. Formulary Compliance Approval.      glucose blood test strip   Check blood glucose twice daily once in AM and once in PM and keep log.      Lancets misc   Use to test blood glucose up to four times daily as needed. Formulary Compliance Approval. Diagnosis: Type 2 Diabetes - Not Insulin Dependent             Continue These Medications        Instructions Start Date   aspirin 81 MG EC tablet   Aspir-81 mg tablet,delayed release   Take 1 tablet every day by oral route.      atorvastatin 40 MG tablet  Commonly known as: LIPITOR   40 mg, Oral, Nightly      empagliflozin 10 MG tablet tablet  Commonly known as: JARDIANCE   10 mg, Oral, Daily      fenofibrate 145 MG tablet  Commonly known as: TRICOR   145 mg, Oral, Nightly      FLUoxetine 20 MG capsule  Commonly known as: PROzac   20 mg, Oral, Daily      glipizide 10 MG 24 hr tablet  Commonly known as: GLUCOTROL XL   10 mg, Oral, Daily      hydrOXYzine pamoate 25 MG capsule  Commonly known as: VISTARIL   50 mg, Oral, Nightly      metoprolol tartrate 25 MG tablet  Commonly known as: LOPRESSOR   12.5 mg, Oral, Daily                 Additional Instructions for the Follow-ups that You Need to Schedule       Discharge Follow-up with PCP   As directed       Currently Documented PCP:    Luma Pittman, APRJESÚS    PCP Phone Number:    [phone]     Follow Up Details: 1 week post hospital follow up, repeat check of triglycerides               Follow-up Information       Luma Pittman, APRN .    Specialty: Nurse Practitioner  Why: 1 week post hospital follow up, repeat check of triglycerides  Contact  information:  [address] [place] [address]  [phone]               Pittman, April, APRN .    Specialty: Nurse Practitioner  Contact information:  [address] [facility] [place] [address]  [phone]                              TEST  RESULTS PENDING AT DISCHARGE  Pending Labs       Order Current Status    Blood Culture - Blood, Arm, Left Preliminary result    Blood Culture - Blood, Hand, Right Preliminary result             The ASCVD Risk score (Yennifer ASTORGA, et al., 2019) failed to calculate for the following reasons:    Cannot find a previous HDL lab    Cannot find a previous total cholesterol lab     CODE STATUS  Code Status and Medical Interventions:   Ordered at: 09/05/23 2252     Code Status (Patient has no pulse and is not breathing):    CPR (Attempt to Resuscitate)     Medical Interventions (Patient has pulse or is breathing):    Full Support       Deangelo Thomason DO  [facility] Hospitalist  09/09/23  19:04 EDT    Please note that this discharge summary required more than 30 minutes to complete.      Electronically signed by Deangelo Thomason DO at 09/09/23 0850

## 2023-09-11 LAB — BACTERIA SPEC AEROBE CULT: NORMAL

## 2023-09-13 LAB
BACTERIA SPEC AEROBE CULT: ABNORMAL
GRAM STN SPEC: ABNORMAL
ISOLATED FROM: ABNORMAL

## 2023-09-14 NOTE — PAYOR COMM NOTE
"[facility]  NPI:[id number]    Utilization Review  Contact: Farhana Darnell RN  Phone: [phone]  Fax:[phone]    CLINICAL UPDATE   LAB FLOW SHEETS AS REQUESTED       Kylie Matson (52 y.o. Male)       Date of Birth   1970    Social Security Number       Address   [address] [place] [address]    Home Phone   [phone]    MRN   [id number]       Religious   Non-Druze    Marital Status                               Admission Date   9/5/23    Admission Type   Emergency    Admitting Provider   Deangelo Thomason DO    Attending Provider       Department, Room/Bed   [facility] PROGRESS CARE, P203/S2       Discharge Date   9/8/2023    Discharge Disposition   Home or Self Care    Discharge Destination   Home                              Attending Provider: (none)   Allergies: No Known Allergies    Isolation: None   Infection: None   Code Status: Prior    Ht: 182.9 cm (72\")   Wt: 95.6 kg (210 lb 12.2 oz)    Admission Cmt: None   Principal Problem: Pancreatitis [K85.90]                   Active Insurance as of 9/5/2023       Primary Coverage       Payor Plan Insurance Group Employer/Plan Group    [employer] PPO [id number]       Payor Plan Address Payor Plan Phone Number Payor Plan Fax Number Effective Dates    [address] [phone]  1/1/2023 - None Entered    [place] [address]         Subscriber Name Subscriber Birth Date Member ID       KYLIE MATSON 1970 [id number]                     Emergency Contacts        (Rel.) Home Phone Work Phone Mobile Phone    NIKKI MATSON (Spouse) [phone] -- [phone]              Lab Results (all)       Procedure Component Value Units Date/Time    Blood Culture - Blood, Arm, Left [[id number]]  (Abnormal) Collected: 09/06/23 0903    Specimen: Blood from Arm, Left Updated: 09/13/23 1347     Blood Culture Cutibacterium acnes     Isolated " from Anaerobic Bottle     Gram Stain Anaerobic Bottle Gram positive bacilli    Narrative:      Probable contaminant requires clinical correlation, susceptibility not performed unless requested by physician.    Blood culture does not meet the specified criteria for PCR identification.  If pregnant, immunocompromised, or clinical concern for meningitis, call lab to run BCID for Listeria monocytogenes.    Blood Culture - Blood, Hand, Right [[id number]]  (Normal) Collected: 09/06/23 0903    Specimen: Blood from Hand, Right Updated: 09/11/23 0930     Blood Culture No growth at 5 days    POC Glucose Once [[id number]]  (Abnormal) Collected: 09/08/23 1125    Specimen: Blood Updated: 09/08/23 1131     Glucose 152 mg/dL     Basic Metabolic Panel [[id number]]  (Abnormal) Collected: 09/08/23 0725    Specimen: Blood Updated: 09/08/23 0840     Glucose 95 mg/dL      BUN 8 mg/dL      Creatinine 0.64 mg/dL      Sodium 136 mmol/L      Potassium 3.7 mmol/L      Comment: Slight hemolysis detected by analyzer. Results may be affected.        Chloride 103 mmol/L      CO2 25.1 mmol/L      Calcium 8.9 mg/dL      BUN/Creatinine Ratio 12.5     Anion Gap 7.9 mmol/L      eGFR 112.5 mL/min/1.73     Narrative:      GFR Normal >60  Chronic Kidney Disease <60  Kidney Failure <15      Triglycerides [[id number]]  (Abnormal) Collected: 09/08/23 0725    Specimen: Blood Updated: 09/08/23 0840     Triglycerides 662 mg/dL     Narrative:      Triglyceride Reference Ranges:    Normal           less than 150 mg/dL  Borderline       150-199 mg/dL  High             200-499 mg/dL  Very High        greater than 499 mg/dL    POC Glucose Once [[id number]]  (Abnormal) Collected: 09/08/23 0831    Specimen: Blood Updated: 09/08/23 0837     Glucose 144 mg/dL     POC Glucose Once [[id number]]  (Normal) Collected: 09/08/23 0627    Specimen: Blood Updated: 09/08/23 0633     Glucose 91 mg/dL     POC Glucose Once [[id number]]  (Normal) Collected: 09/08/23 0444    Specimen: Blood  Updated: 09/08/23 0452     Glucose 95 mg/dL     POC Glucose Once [[id number]]  (Normal) Collected: 09/08/23 0247    Specimen: Blood Updated: 09/08/23 0253     Glucose 99 mg/dL     POC Glucose Once [[id number]]  (Abnormal) Collected: 09/08/23 0049    Specimen: Blood Updated: 09/08/23 0056     Glucose 144 mg/dL     POC Glucose Once [[id number]]  (Abnormal) Collected: 09/07/23 2241    Specimen: Blood Updated: 09/07/23 2247     Glucose 150 mg/dL     Triglycerides [[id number]]  (Abnormal) Collected: 09/07/23 2010    Specimen: Blood Updated: 09/07/23 2045     Triglycerides 756 mg/dL     Narrative:      Triglyceride Reference Ranges:    Normal           less than 150 mg/dL  Borderline       150-199 mg/dL  High             200-499 mg/dL  Very High        greater than 499 mg/dL    POC Glucose Once [[id number]]  (Abnormal) Collected: 09/07/23 2025    Specimen: Blood Updated: 09/07/23 2035     Glucose 196 mg/dL     POC Glucose Once [[id number]]  (Normal) Collected: 09/07/23 1822    Specimen: Blood Updated: 09/07/23 1829     Glucose 115 mg/dL     Basic Metabolic Panel [[id number]]  (Abnormal) Collected: 09/07/23 1615    Specimen: Blood Updated: 09/07/23 1716     Glucose 100 mg/dL      BUN 10 mg/dL      Creatinine 0.61 mg/dL      Sodium 134 mmol/L      Potassium 4.3 mmol/L      Comment: Slight hemolysis detected by analyzer. Results may be affected. 1+ Lipemia            Chloride 102 mmol/L      CO2 20.9 mmol/L      Calcium 8.7 mg/dL      BUN/Creatinine Ratio 16.4     Anion Gap 11.1 mmol/L      eGFR 114.1 mL/min/1.73     Narrative:      GFR Normal >60  Chronic Kidney Disease <60  Kidney Failure <15      Phosphorus [[id number]]  (Abnormal) Collected: 09/07/23 1615    Specimen: Blood Updated: 09/07/23 1648     Phosphorus 2.0 mg/dL     POC Glucose Once [[id number]]  (Normal) Collected: 09/07/23 1607    Specimen: Blood Updated: 09/07/23 1621     Glucose 91 mg/dL     POC Glucose Once [[id number]]  (Normal) Collected: 09/07/23 1436    Specimen:  Blood Updated: 09/07/23 1444     Glucose 120 mg/dL     POC Glucose Once [[id number]]  (Abnormal) Collected: 09/07/23 1240    Specimen: Blood Updated: 09/07/23 1246     Glucose 151 mg/dL     POC Glucose Once [[id number]]  (Abnormal) Collected: 09/07/23 1006    Specimen: Blood Updated: 09/07/23 1012     Glucose 131 mg/dL     POC Glucose Once [[id number]]  (Abnormal) Collected: 09/07/23 0820    Specimen: Blood Updated: 09/07/23 0829     Glucose 171 mg/dL     Phosphorus [[id number]]  (Abnormal) Collected: 09/07/23 0648    Specimen: Blood Updated: 09/07/23 0738     Phosphorus 2.1 mg/dL     Triglycerides [[id number]]  (Abnormal) Collected: 09/07/23 0648    Specimen: Blood Updated: 09/07/23 0738     Triglycerides 1,048 mg/dL     Narrative:      Triglyceride Reference Ranges:    Normal           less than 150 mg/dL  Borderline       150-199 mg/dL  High             200-499 mg/dL  Very High        greater than 499 mg/dL    BNP [[id number]]  (Normal) Collected: 09/07/23 0648    Specimen: Blood Updated: 09/07/23 0729     proBNP 54.3 pg/mL     Narrative:      Among patients with dyspnea, NT-proBNP is highly sensitive for the detection of acute congestive heart failure. In addition NT-proBNP of <300 pg/ml effectively rules out acute congestive heart failure with 99% negative predictive value.      Magnesium [[id number]]  (Normal) Collected: 09/07/23 0648    Specimen: Blood Updated: 09/07/23 0726     Magnesium 2.0 mg/dL     Basic Metabolic Panel [[id number]]  (Abnormal) Collected: 09/07/23 0648    Specimen: Blood Updated: 09/07/23 0726     Glucose 120 mg/dL      BUN 11 mg/dL      Creatinine 0.57 mg/dL      Sodium 137 mmol/L      Potassium 3.3 mmol/L      Comment: Slight hemolysis detected by analyzer. Results may be affected.        Chloride 103 mmol/L      CO2 26.2 mmol/L      Calcium 8.5 mg/dL      BUN/Creatinine Ratio 19.3     Anion Gap 7.8 mmol/L      eGFR 116.5 mL/min/1.73     Narrative:      GFR Normal >60  Chronic Kidney Disease  <60  Kidney Failure <15      CBC (No Diff) [[id number]]  (Abnormal) Collected: 09/07/23 0648    Specimen: Blood Updated: 09/07/23 0713     WBC 11.28 10*3/mm3      RBC 3.94 10*6/mm3      Hemoglobin 12.7 g/dL      Hematocrit 36.4 %      MCV 92.4 fL      MCH 32.2 pg      MCHC 34.9 g/dL      RDW 12.5 %      RDW-SD 42.5 fl      MPV 9.8 fL      Platelets 197 10*3/mm3     POC Glucose Once [[id number]]  (Normal) Collected: 09/07/23 0645    Specimen: Blood Updated: 09/07/23 0653     Glucose 109 mg/dL     POC Glucose Once [[id number]]  (Normal) Collected: 09/07/23 0448    Specimen: Blood Updated: 09/07/23 0503     Glucose 125 mg/dL     POC Glucose Once [[id number]]  (Abnormal) Collected: 09/07/23 0248    Specimen: Blood Updated: 09/07/23 0255     Glucose 132 mg/dL     POC Glucose Once [[id number]]  (Normal) Collected: 09/07/23 0033    Specimen: Blood Updated: 09/07/23 0040     Glucose 127 mg/dL     POC Glucose Once [[id number]]  (Abnormal) Collected: 09/06/23 2230    Specimen: Blood Updated: 09/06/23 2236     Glucose 147 mg/dL     Triglycerides [[id number]]  (Abnormal) Collected: 09/06/23 2013    Specimen: Blood Updated: 09/06/23 2056     Triglycerides 1,463 mg/dL     Narrative:      Triglyceride Reference Ranges:    Normal           less than 150 mg/dL  Borderline       150-199 mg/dL  High             200-499 mg/dL  Very High        greater than 499 mg/dL    POC Glucose Once [[id number]]  (Abnormal) Collected: 09/06/23 2031    Specimen: Blood Updated: 09/06/23 2037     Glucose 148 mg/dL     Basic Metabolic Panel [[id number]]  (Abnormal) Collected: 09/06/23 1850    Specimen: Blood Updated: 09/06/23 2003     Glucose 176 mg/dL      BUN 10 mg/dL      Creatinine 0.57 mg/dL      Sodium 134 mmol/L      Potassium 3.9 mmol/L      Comment: Specimen hemolyzed.  Results may be affected.        Chloride 99 mmol/L      CO2 24.9 mmol/L      Calcium 8.8 mg/dL      BUN/Creatinine Ratio 17.5     Anion Gap 10.1 mmol/L      eGFR 116.5 mL/min/1.73      Narrative:      GFR Normal >60  Chronic Kidney Disease <60  Kidney Failure <15      POC Glucose Once [[id number]]  (Abnormal) Collected: 09/06/23 1815    Specimen: Blood Updated: 09/06/23 1821     Glucose 177 mg/dL     POC Glucose Once [[id number]]  (Abnormal) Collected: 09/06/23 1732    Specimen: Blood Updated: 09/06/23 1738     Glucose 186 mg/dL     POC Glucose Once [[id number]]  (Abnormal) Collected: 09/06/23 1627    Specimen: Blood Updated: 09/06/23 1633     Glucose 154 mg/dL     POC Glucose Once [[id number]]  (Abnormal) Collected: 09/06/23 1540    Specimen: Blood Updated: 09/06/23 1546     Glucose 158 mg/dL     POC Glucose Once [[id number]]  (Abnormal) Collected: 09/06/23 1437    Specimen: Blood Updated: 09/06/23 1442     Glucose 181 mg/dL     POC Glucose Once [[id number]]  (Abnormal) Collected: 09/06/23 1322    Specimen: Blood Updated: 09/06/23 1328     Glucose 197 mg/dL     POC Glucose Once [[id number]]  (Abnormal) Collected: 09/06/23 1157    Specimen: Blood Updated: 09/06/23 1203     Glucose 187 mg/dL     POC Glucose Once [[id number]]  (Abnormal) Collected: 09/06/23 1037    Specimen: Blood Updated: 09/06/23 1043     Glucose 140 mg/dL     POC Glucose Once [[id number]]  (Abnormal) Collected: 09/06/23 0926    Specimen: Blood Updated: 09/06/23 0932     Glucose 137 mg/dL     CBC (No Diff) [[id number]]  (Abnormal) Collected: 09/06/23 0903    Specimen: Blood Updated: 09/06/23 0914     WBC 17.47 10*3/mm3      RBC 4.89 10*6/mm3      Hemoglobin 16.1 g/dL      Hematocrit 44.6 %      MCV 91.2 fL      MCH 32.9 pg      MCHC 36.1 g/dL      RDW 12.6 %      RDW-SD 41.2 fl      MPV 10.6 fL      Platelets 308 10*3/mm3     POC Glucose Once [[id number]]  (Abnormal) Collected: 09/06/23 0849    Specimen: Blood Updated: 09/06/23 0856     Glucose 141 mg/dL     Triglycerides [[id number]]  (Abnormal) Collected: 09/06/23 0711    Specimen: Blood Updated: 09/06/23 0843     Triglycerides 2,566 mg/dL     Narrative:      Triglyceride Reference  Ranges:    Normal           less than 150 mg/dL  Borderline       150-199 mg/dL  High             200-499 mg/dL  Very High        greater than 499 mg/dL    Basic Metabolic Panel [[id number]]  (Abnormal) Collected: 09/06/23 0712    Specimen: Blood Updated: 09/06/23 0834     Glucose 144 mg/dL      BUN 10 mg/dL      Creatinine 0.74 mg/dL      Sodium 135 mmol/L      Potassium 4.1 mmol/L      Comment: Specimen hemolyzed.  Results may be affected.  1+ Hemolysis    4+ Lipemia            Chloride 102 mmol/L      CO2 23.4 mmol/L      Calcium 8.8 mg/dL      BUN/Creatinine Ratio 13.5     Anion Gap 9.6 mmol/L      eGFR 107.7 mL/min/1.73     Narrative:      4+ Lipemia        GFR Normal >60  Chronic Kidney Disease <60  Kidney Failure <15      POC Glucose Once [[id number]]  (Abnormal) Collected: 09/06/23 0723    Specimen: Blood Updated: 09/06/23 0729     Glucose 152 mg/dL     POC Glucose Once [[id number]]  (Normal) Collected: 09/06/23 0610    Specimen: Blood Updated: 09/06/23 0616     Glucose 123 mg/dL     POC Glucose Once [[id number]]  (Abnormal) Collected: 09/06/23 0546    Specimen: Blood Updated: 09/06/23 0552     Glucose 160 mg/dL     POC Glucose Once [[id number]]  (Abnormal) Collected: 09/06/23 0458    Specimen: Blood Updated: 09/06/23 0504     Glucose 151 mg/dL     POC Glucose Once [[id number]]  (Abnormal) Collected: 09/06/23 0348    Specimen: Blood Updated: 09/06/23 0354     Glucose 143 mg/dL     Basic Metabolic Panel [[id number]]  (Abnormal) Collected: 09/06/23 0212    Specimen: Blood Updated: 09/06/23 0251     Glucose 145 mg/dL      BUN 11 mg/dL      Creatinine 0.60 mg/dL      Sodium 131 mmol/L      Potassium 5.5 mmol/L      Comment: Specimen hemolyzed.  Results may be affected. 2+ Hemolysis    4+ Lipemia            Chloride 102 mmol/L      CO2 17.3 mmol/L      Calcium 8.4 mg/dL      BUN/Creatinine Ratio 18.3     Anion Gap 11.7 mmol/L      eGFR 114.7 mL/min/1.73     Narrative:      GFR Normal >60  Chronic Kidney Disease  <60  Kidney Failure <15      POC Glucose Once [[id number]]  (Abnormal) Collected: 09/06/23 0229    Specimen: Blood Updated: 09/06/23 0234     Glucose 147 mg/dL     Magnesium [[id number]]  (Normal) Collected: 09/06/23 0121    Specimen: Blood Updated: 09/06/23 0213     Magnesium 2.2 mg/dL     Phosphorus [[id number]]  (Normal) Collected: 09/06/23 0121    Specimen: Blood Updated: 09/06/23 0213     Phosphorus 3.2 mg/dL     Hemoglobin A1c [[id number]]  (Abnormal) Collected: 09/06/23 0121    Specimen: Blood Updated: 09/06/23 0141     Hemoglobin A1C 10.30 %     Narrative:      Hemoglobin A1C Ranges:    Increased Risk for Diabetes  5.7% to 6.4%  Diabetes                     >= 6.5%  Diabetic Goal                < 7.0%    POC Glucose Once [[id number]]  (Abnormal) Collected: 09/06/23 0103    Specimen: Blood Updated: 09/06/23 0109     Glucose 139 mg/dL     POC Glucose Once [[id number]]  (Abnormal) Collected: 09/06/23 0037    Specimen: Blood Updated: 09/06/23 0044     Glucose 138 mg/dL     POC Glucose Once [[id number]]  (Abnormal) Collected: 09/05/23 2329    Specimen: Blood Updated: 09/05/23 2336     Glucose 151 mg/dL     POC Glucose Once [[id number]]  (Abnormal) Collected: 09/05/23 2232    Specimen: Blood Updated: 09/05/23 2239     Glucose 150 mg/dL     POC Glucose Once [[id number]]  (Abnormal) Collected: 09/05/23 2148    Specimen: Blood Updated: 09/05/23 2154     Glucose 158 mg/dL     POC Glucose Once [[id number]]  (Abnormal) Collected: 09/05/23 2122    Specimen: Blood Updated: 09/05/23 2129     Glucose 146 mg/dL     POC Glucose Once [[id number]]  (Abnormal) Collected: 09/05/23 2012    Specimen: Blood Updated: 09/05/23 2018     Glucose 135 mg/dL     Phosphorus [[id number]]  (Normal) Collected: 09/05/23 1914    Specimen: Blood Updated: 09/05/23 2007     Phosphorus 2.6 mg/dL     Magnesium [[id number]]  (Normal) Collected: 09/05/23 1914    Specimen: Blood Updated: 09/05/23 2007     Magnesium 2.4 mg/dL     Basic Metabolic Panel [[id number]]   (Abnormal) Collected: 09/05/23 1914    Specimen: Blood Updated: 09/05/23 2007     Glucose 149 mg/dL      BUN 13 mg/dL      Creatinine 0.64 mg/dL      Sodium 132 mmol/L      Potassium 4.6 mmol/L      Comment: Specimen hemolyzed.  Results may be affected. 1+ Hemolysis    4+ Lipemia            Chloride 100 mmol/L      CO2 12.0 mmol/L      Calcium 9.3 mg/dL      BUN/Creatinine Ratio 20.3     Anion Gap 20.0 mmol/L      eGFR 112.5 mL/min/1.73     Narrative:      GFR Normal >60  Chronic Kidney Disease <60  Kidney Failure <15      POC Glucose Once [[id number]]  (Abnormal) Collected: 09/05/23 1856    Specimen: Blood Updated: 09/05/23 1902     Glucose 138 mg/dL     Triglycerides [[id number]]  (Abnormal) Collected: 09/05/23 1347    Specimen: Blood Updated: 09/05/23 1810     Triglycerides >4,425 mg/dL     Narrative:        4+ Lipemia    Triglyceride Reference Ranges:    Normal           less than 150 mg/dL  Borderline       150-199 mg/dL  High             200-499 mg/dL  Very High        greater than 499 mg/dL    [place] Draw [[id number]] Collected: 09/05/23 1347    Specimen: Blood Updated: 09/05/23 1500    Narrative:      The following orders were created for panel order [place] [facility].  Procedure                               Abnormality         Status                     ---------                               -----------         ------                     Green Top (Gel)[[id number]]                                  Final result               Lavender Top[[id number]]                                     Final result               Gold Top - SST[[id number]]                                   Final result               Light Blue Top[[id number]]                                   Final result                 Please view results for these tests on the individual orders.    Lavender Top [[id number]] Collected: 09/05/23 1347    Specimen: Blood Updated: 09/05/23 1500     Extra Tube hold for add-on     Comment: Auto resulted       Light Blue Top  [[id number]] Collected: 09/05/23 1347    Specimen: Blood Updated: 09/05/23 1500     Extra Tube Hold for add-ons.     Comment: Auto resulted       Green Top (Gel) [[id number]] Collected: 09/05/23 1347    Specimen: Blood Updated: 09/05/23 1500     Extra Tube Hold for add-ons.     Comment: Auto resulted.       Gold Top - SST [[id number]] Collected: 09/05/23 1347    Specimen: Blood Updated: 09/05/23 1500     Extra Tube Hold for add-ons.     Comment: Auto resulted.       Lactic Acid, Plasma [[id number]]  (Normal) Collected: 09/05/23 1347    Specimen: Blood Updated: 09/05/23 1450     Lactate 1.5 mmol/L      Comment: 4+ Lipemia, result may be affected.           Comprehensive Metabolic Panel [[id number]]  (Abnormal) Collected: 09/05/23 1347    Specimen: Blood Updated: 09/05/23 1450     Glucose 238 mg/dL      BUN 16 mg/dL      Creatinine 1.16 mg/dL      Sodium 126 mmol/L      Potassium 5.8 mmol/L      Comment: Specimen hemolyzed.  Results may be affected.  1+ Hemolysis    4+ Lipemia            Chloride 94 mmol/L      CO2 17.5 mmol/L      Calcium --     Comment: Unable to calculate due to lipemia.            Total Protein 6.3 g/dL      Albumin 0.4 g/dL      ALT (SGPT) 16 U/L      Comment: Specimen hemolyzed.  Results may be affected.        AST (SGOT) 28 U/L      Alkaline Phosphatase 75 U/L      Total Bilirubin 0.2 mg/dL      Globulin 5.9 gm/dL      A/G Ratio 0.1 g/dL      BUN/Creatinine Ratio 13.8     Anion Gap 14.5 mmol/L      eGFR 74.8 mL/min/1.73     Narrative:      4+ Lipemia, results may be affected due to lipemia.    GFR Normal >60  Chronic Kidney Disease <60  Kidney Failure <15      Lipase [[id number]]  (Abnormal) Collected: 09/05/23 1347    Specimen: Blood Updated: 09/05/23 1443     Lipase >3,000 U/L      Comment: 4+ Lipemia    1+ Hemolysis           CBC & Differential [[id number]]  (Abnormal) Collected: 09/05/23 1347    Specimen: Blood Updated: 09/05/23 1415    Narrative:      The following orders were created for panel  order CBC & Differential.  Procedure                               Abnormality         Status                     ---------                               -----------         ------                     CBC Auto Differential[[id number]]        Abnormal            Final result               Scan Slide[[id number]]                                                                    Please view results for these tests on the individual orders.    CBC Auto Differential [[id number]]  (Abnormal) Collected: 09/05/23 1347    Specimen: Blood Updated: 09/05/23 1415     WBC 11.62 10*3/mm3      RBC 5.15 10*6/mm3      Hemoglobin 19.4 g/dL      Hematocrit 47.2 %      MCV 91.7 fL      MCH 37.7 pg      MCHC 36.9 g/dL      RDW 12.2 %      RDW-SD 41.3 fl      MPV 10.6 fL      Platelets 306 10*3/mm3      Neutrophil % 77.4 %      Lymphocyte % 11.4 %      Monocyte % 7.6 %      Eosinophil % 2.2 %      Basophil % 0.5 %      Immature Grans % 0.9 %      Neutrophils, Absolute 8.99 10*3/mm3      Lymphocytes, Absolute 1.33 10*3/mm3      Monocytes, Absolute 0.88 10*3/mm3      Eosinophils, Absolute 0.26 10*3/mm3      Basophils, Absolute 0.06 10*3/mm3      Immature Grans, Absolute 0.10 10*3/mm3      nRBC 0.0 /100 WBC     Urinalysis With Microscopic If Indicated (No Culture) - Urine, Clean Catch [[id number]]  (Abnormal) Collected: 09/05/23 1347    Specimen: Urine, Clean Catch Updated: 09/05/23 1351     Color, UA Yellow     Appearance, UA Clear     pH, UA <=5.0     Specific Gravity, UA >1.030     Glucose, UA >=1000 mg/dL (3+)     Ketones, UA 15 mg/dL (1+)     Bilirubin, UA Negative     Blood, UA Negative     Protein, UA Trace     Leuk Esterase, UA Negative     Nitrite, UA Negative     Urobilinogen, UA 0.2 E.U./dL    Narrative:      Urine microscopic not indicated.

## 2024-12-16 ENCOUNTER — APPOINTMENT (OUTPATIENT)
Dept: CT IMAGING | Facility: HOSPITAL | Age: 54
End: 2024-12-16
Payer: COMMERCIAL

## 2024-12-16 ENCOUNTER — APPOINTMENT (OUTPATIENT)
Dept: GENERAL RADIOLOGY | Facility: HOSPITAL | Age: 54
End: 2024-12-16
Payer: COMMERCIAL

## 2024-12-16 ENCOUNTER — HOSPITAL ENCOUNTER (INPATIENT)
Facility: HOSPITAL | Age: 54
LOS: 5 days | Discharge: HOME | End: 2024-12-22
Attending: EMERGENCY MEDICINE | Admitting: HOSPITALIST
Payer: COMMERCIAL

## 2024-12-16 DIAGNOSIS — E78.1 HYPERTRIGLYCERIDEMIA: ICD-10-CM

## 2024-12-16 DIAGNOSIS — K85.90 ACUTE PANCREATITIS, UNSPECIFIED COMPLICATION STATUS, UNSPECIFIED PANCREATITIS TYPE: Primary | ICD-10-CM

## 2024-12-16 DIAGNOSIS — K85.80 OTHER ACUTE PANCREATITIS WITHOUT INFECTION OR NECROSIS: ICD-10-CM

## 2024-12-16 LAB
APTT PPP: 28.6 SECONDS (ref 26.5–34.5)
BILIRUB UR QL STRIP: NEGATIVE
CLARITY UR: CLEAR
COLOR UR: YELLOW
CREAT BLDA-MCNC: 0.8 MG/DL (ref 0.6–1.3)
DEPRECATED RDW RBC AUTO: 42.1 FL (ref 37–54)
ERYTHROCYTE [DISTWIDTH] IN BLOOD BY AUTOMATED COUNT: 12.8 % (ref 12.3–15.4)
GLUCOSE BLDC GLUCOMTR-MCNC: 230 MG/DL (ref 70–130)
GLUCOSE BLDC GLUCOMTR-MCNC: 250 MG/DL (ref 70–130)
GLUCOSE UR STRIP-MCNC: ABNORMAL MG/DL
HCT VFR BLD AUTO: 49.8 % (ref 37.5–51)
HGB BLD-MCNC: 18 G/DL (ref 13–17.7)
HGB UR QL STRIP.AUTO: NEGATIVE
HOLD SPECIMEN: NORMAL
HOLD SPECIMEN: NORMAL
INR PPP: 0.97 (ref 0.9–1.1)
KETONES UR QL STRIP: ABNORMAL
LEUKOCYTE ESTERASE UR QL STRIP.AUTO: NEGATIVE
LYMPHOCYTES # BLD MANUAL: 0.68 10*3/MM3 (ref 0.7–3.1)
LYMPHOCYTES NFR BLD MANUAL: 4 % (ref 5–12)
MCH RBC QN AUTO: 33 PG (ref 26.6–33)
MCHC RBC AUTO-ENTMCNC: 36.4 G/DL (ref 31.5–35.7)
MCV RBC AUTO: 91 FL (ref 79–97)
MONOCYTES # BLD: 0.68 10*3/MM3 (ref 0.1–0.9)
NEUTROPHILS # BLD AUTO: 15.62 10*3/MM3 (ref 1.7–7)
NEUTROPHILS NFR BLD MANUAL: 85 % (ref 42.7–76)
NEUTS BAND NFR BLD MANUAL: 7 % (ref 0–5)
NITRITE UR QL STRIP: NEGATIVE
PH UR STRIP.AUTO: 5.5 [PH] (ref 5–8)
PLAT MORPH BLD: NORMAL
PLATELET # BLD AUTO: 267 10*3/MM3 (ref 140–450)
PMV BLD AUTO: 10.4 FL (ref 6–12)
PROT UR QL STRIP: ABNORMAL
PROTHROMBIN TIME: 13 SECONDS (ref 12.1–14.7)
RBC # BLD AUTO: 5.47 10*6/MM3 (ref 4.14–5.8)
RBC MORPH BLD: NORMAL
SP GR UR STRIP: >1.03 (ref 1–1.03)
UROBILINOGEN UR QL STRIP: ABNORMAL
VARIANT LYMPHS NFR BLD MANUAL: 4 % (ref 19.6–45.3)
WBC NRBC COR # BLD AUTO: 16.98 10*3/MM3 (ref 3.4–10.8)
WHOLE BLOOD HOLD COAG: NORMAL
WHOLE BLOOD HOLD SPECIMEN: NORMAL

## 2024-12-16 PROCEDURE — 85007 BL SMEAR W/DIFF WBC COUNT: CPT | Performed by: EMERGENCY MEDICINE

## 2024-12-16 PROCEDURE — 25010000002 HYDROMORPHONE PER 4 MG

## 2024-12-16 PROCEDURE — 84484 ASSAY OF TROPONIN QUANT: CPT

## 2024-12-16 PROCEDURE — 25010000002 ONDANSETRON PER 1 MG

## 2024-12-16 PROCEDURE — 25010000002 HYDROMORPHONE 1 MG/ML SOLUTION

## 2024-12-16 PROCEDURE — 25810000003 SODIUM CHLORIDE 0.9 % SOLUTION

## 2024-12-16 PROCEDURE — 99223 1ST HOSP IP/OBS HIGH 75: CPT | Performed by: HOSPITALIST

## 2024-12-16 PROCEDURE — 71045 X-RAY EXAM CHEST 1 VIEW: CPT | Performed by: RADIOLOGY

## 2024-12-16 PROCEDURE — 93005 ELECTROCARDIOGRAM TRACING: CPT

## 2024-12-16 PROCEDURE — 99285 EMERGENCY DEPT VISIT HI MDM: CPT

## 2024-12-16 PROCEDURE — 74177 CT ABD & PELVIS W/CONTRAST: CPT

## 2024-12-16 PROCEDURE — 25510000001 IOPAMIDOL 61 % SOLUTION

## 2024-12-16 PROCEDURE — 83036 HEMOGLOBIN GLYCOSYLATED A1C: CPT

## 2024-12-16 PROCEDURE — 87040 BLOOD CULTURE FOR BACTERIA: CPT

## 2024-12-16 PROCEDURE — 25010000002 PIPERACILLIN SOD-TAZOBACTAM PER 1 G

## 2024-12-16 PROCEDURE — 83735 ASSAY OF MAGNESIUM: CPT

## 2024-12-16 PROCEDURE — 83690 ASSAY OF LIPASE: CPT | Performed by: EMERGENCY MEDICINE

## 2024-12-16 PROCEDURE — 81003 URINALYSIS AUTO W/O SCOPE: CPT | Performed by: EMERGENCY MEDICINE

## 2024-12-16 PROCEDURE — 85025 COMPLETE CBC W/AUTO DIFF WBC: CPT | Performed by: EMERGENCY MEDICINE

## 2024-12-16 PROCEDURE — 83605 ASSAY OF LACTIC ACID: CPT | Performed by: EMERGENCY MEDICINE

## 2024-12-16 PROCEDURE — 80053 COMPREHEN METABOLIC PANEL: CPT | Performed by: EMERGENCY MEDICINE

## 2024-12-16 PROCEDURE — 74177 CT ABD & PELVIS W/CONTRAST: CPT | Performed by: RADIOLOGY

## 2024-12-16 PROCEDURE — 25010000002 PROCHLORPERAZINE 10 MG/2ML SOLUTION

## 2024-12-16 PROCEDURE — 93010 ELECTROCARDIOGRAM REPORT: CPT | Performed by: INTERNAL MEDICINE

## 2024-12-16 PROCEDURE — 84145 PROCALCITONIN (PCT): CPT

## 2024-12-16 PROCEDURE — 82565 ASSAY OF CREATININE: CPT

## 2024-12-16 PROCEDURE — 84478 ASSAY OF TRIGLYCERIDES: CPT

## 2024-12-16 PROCEDURE — 80061 LIPID PANEL: CPT

## 2024-12-16 PROCEDURE — 71045 X-RAY EXAM CHEST 1 VIEW: CPT

## 2024-12-16 PROCEDURE — 82948 REAGENT STRIP/BLOOD GLUCOSE: CPT

## 2024-12-16 PROCEDURE — 85610 PROTHROMBIN TIME: CPT

## 2024-12-16 PROCEDURE — 85730 THROMBOPLASTIN TIME PARTIAL: CPT

## 2024-12-16 PROCEDURE — 83615 LACTATE (LD) (LDH) ENZYME: CPT

## 2024-12-16 PROCEDURE — 84100 ASSAY OF PHOSPHORUS: CPT | Performed by: HOSPITALIST

## 2024-12-16 RX ORDER — DEXTROSE MONOHYDRATE 25 G/50ML
25 INJECTION, SOLUTION INTRAVENOUS
Status: DISCONTINUED | OUTPATIENT
Start: 2024-12-16 | End: 2024-12-21

## 2024-12-16 RX ORDER — PROCHLORPERAZINE EDISYLATE 5 MG/ML
5 INJECTION INTRAMUSCULAR; INTRAVENOUS ONCE
Status: COMPLETED | OUTPATIENT
Start: 2024-12-16 | End: 2024-12-16

## 2024-12-16 RX ORDER — ONDANSETRON 2 MG/ML
4 INJECTION INTRAMUSCULAR; INTRAVENOUS ONCE
Status: COMPLETED | OUTPATIENT
Start: 2024-12-16 | End: 2024-12-16

## 2024-12-16 RX ORDER — IOPAMIDOL 612 MG/ML
100 INJECTION, SOLUTION INTRAVASCULAR
Status: COMPLETED | OUTPATIENT
Start: 2024-12-16 | End: 2024-12-16

## 2024-12-16 RX ORDER — DEXTROSE MONOHYDRATE 100 MG/ML
1-2 INJECTION, SOLUTION INTRAVENOUS
Status: DISPENSED | OUTPATIENT
Start: 2024-12-16 | End: 2024-12-17

## 2024-12-16 RX ORDER — GLUCAGON 1 MG/ML
1 KIT INJECTION
Status: DISCONTINUED | OUTPATIENT
Start: 2024-12-16 | End: 2024-12-21

## 2024-12-16 RX ORDER — SODIUM CHLORIDE 0.9 % (FLUSH) 0.9 %
10 SYRINGE (ML) INJECTION AS NEEDED
Status: DISCONTINUED | OUTPATIENT
Start: 2024-12-16 | End: 2024-12-22 | Stop reason: HOSPADM

## 2024-12-16 RX ORDER — NICOTINE POLACRILEX 4 MG
15 LOZENGE BUCCAL
Status: DISCONTINUED | OUTPATIENT
Start: 2024-12-16 | End: 2024-12-21

## 2024-12-16 RX ORDER — HYDROMORPHONE HYDROCHLORIDE 1 MG/ML
0.5 INJECTION, SOLUTION INTRAMUSCULAR; INTRAVENOUS; SUBCUTANEOUS ONCE
Status: COMPLETED | OUTPATIENT
Start: 2024-12-16 | End: 2024-12-16

## 2024-12-16 RX ADMIN — SODIUM CHLORIDE 2000 ML: 9 INJECTION, SOLUTION INTRAVENOUS at 20:00

## 2024-12-16 RX ADMIN — HYDROMORPHONE HYDROCHLORIDE 0.5 MG: 1 INJECTION, SOLUTION INTRAMUSCULAR; INTRAVENOUS; SUBCUTANEOUS at 18:44

## 2024-12-16 RX ADMIN — PROCHLORPERAZINE EDISYLATE 5 MG: 5 INJECTION INTRAMUSCULAR; INTRAVENOUS at 20:44

## 2024-12-16 RX ADMIN — DEXTROSE MONOHYDRATE 1 ML/KG/HR: 25 INJECTION, SOLUTION INTRAVENOUS at 22:26

## 2024-12-16 RX ADMIN — HYDROMORPHONE HYDROCHLORIDE 1 MG: 1 INJECTION, SOLUTION INTRAMUSCULAR; INTRAVENOUS; SUBCUTANEOUS at 20:44

## 2024-12-16 RX ADMIN — SODIUM CHLORIDE 1000 ML: 9 INJECTION, SOLUTION INTRAVENOUS at 18:43

## 2024-12-16 RX ADMIN — PIPERACILLIN AND TAZOBACTAM 3.38 G: 3; .375 INJECTION, POWDER, FOR SOLUTION INTRAVENOUS at 19:45

## 2024-12-16 RX ADMIN — INSULIN HUMAN 0.05 UNITS/KG/HR: 1 INJECTION, SOLUTION INTRAVENOUS at 22:27

## 2024-12-16 RX ADMIN — IOPAMIDOL 100 ML: 612 INJECTION, SOLUTION INTRAVENOUS at 20:29

## 2024-12-16 RX ADMIN — ONDANSETRON 4 MG: 2 INJECTION INTRAMUSCULAR; INTRAVENOUS at 18:44

## 2024-12-16 NOTE — ED NOTES
Patient provided with urine specimen cup and advised that a urine sample is needed for workup and to return sample to ER staff. Patient voices understanding.

## 2024-12-17 LAB
ALBUMIN SERPL-MCNC: 3.2 G/DL (ref 3.5–5.2)
ALBUMIN SERPL-MCNC: NORMAL G/DL
ALBUMIN/GLOB SERPL: 1.3 G/DL
ALBUMIN/GLOB SERPL: NORMAL {RATIO}
ALP SERPL-CCNC: 48 U/L (ref 39–117)
ALP SERPL-CCNC: NORMAL U/L
ALT SERPL W P-5'-P-CCNC: 31 U/L (ref 1–41)
ALT SERPL W P-5'-P-CCNC: NORMAL U/L
ANION GAP SERPL CALCULATED.3IONS-SCNC: 10.6 MMOL/L (ref 5–15)
ANION GAP SERPL CALCULATED.3IONS-SCNC: 9 MMOL/L (ref 5–15)
ANION GAP SERPL CALCULATED.3IONS-SCNC: NORMAL MMOL/L
AST SERPL-CCNC: 82 U/L (ref 1–40)
AST SERPL-CCNC: NORMAL U/L
BASOPHILS # BLD AUTO: 0.06 10*3/MM3 (ref 0–0.2)
BASOPHILS NFR BLD AUTO: 0.6 % (ref 0–1.5)
BILIRUB SERPL-MCNC: 0.5 MG/DL (ref 0–1.2)
BILIRUB SERPL-MCNC: NORMAL MG/DL
BUN SERPL-MCNC: 25 MG/DL (ref 6–20)
BUN SERPL-MCNC: 28 MG/DL (ref 6–20)
BUN SERPL-MCNC: NORMAL MG/DL
BUN/CREAT SERPL: 15.7 (ref 7–25)
BUN/CREAT SERPL: 16.8 (ref 7–25)
BUN/CREAT SERPL: NORMAL
CALCIUM SPEC-SCNC: 6.9 MG/DL (ref 8.6–10.5)
CALCIUM SPEC-SCNC: 7.2 MG/DL (ref 8.6–10.5)
CALCIUM SPEC-SCNC: NORMAL MMOL/L
CHLORIDE SERPL-SCNC: 100 MMOL/L (ref 98–107)
CHLORIDE SERPL-SCNC: 107 MMOL/L (ref 98–107)
CHLORIDE SERPL-SCNC: NORMAL MMOL/L
CHOLEST SERPL-MCNC: 1185 MG/DL (ref 0–200)
CO2 SERPL-SCNC: 18 MMOL/L (ref 22–29)
CO2 SERPL-SCNC: 20.4 MMOL/L (ref 22–29)
CO2 SERPL-SCNC: NORMAL MMOL/L
CREAT SERPL-MCNC: 1.49 MG/DL (ref 0.76–1.27)
CREAT SERPL-MCNC: 1.78 MG/DL (ref 0.76–1.27)
CREAT SERPL-MCNC: NORMAL MG/DL
D-LACTATE SERPL-SCNC: 2.4 MMOL/L (ref 0.5–2)
D-LACTATE SERPL-SCNC: NORMAL MMOL/L
D-LACTATE SERPL-SCNC: NORMAL MMOL/L
DEPRECATED RDW RBC AUTO: 43.5 FL (ref 37–54)
EGFRCR SERPLBLD CKD-EPI 2021: 44.5 ML/MIN/1.73
EGFRCR SERPLBLD CKD-EPI 2021: 55.1 ML/MIN/1.73
EOSINOPHIL # BLD AUTO: 0.04 10*3/MM3 (ref 0–0.4)
EOSINOPHIL NFR BLD AUTO: 0.4 % (ref 0.3–6.2)
ERYTHROCYTE [DISTWIDTH] IN BLOOD BY AUTOMATED COUNT: 13.4 % (ref 12.3–15.4)
GEN 5 1HR TROPONIN T REFLEX: NORMAL
GLOBULIN UR ELPH-MCNC: 2.4 GM/DL
GLOBULIN UR ELPH-MCNC: NORMAL MG/DL
GLUCOSE BLDC GLUCOMTR-MCNC: 157 MG/DL (ref 70–130)
GLUCOSE BLDC GLUCOMTR-MCNC: 160 MG/DL (ref 70–130)
GLUCOSE BLDC GLUCOMTR-MCNC: 161 MG/DL (ref 70–130)
GLUCOSE BLDC GLUCOMTR-MCNC: 186 MG/DL (ref 70–130)
GLUCOSE BLDC GLUCOMTR-MCNC: 190 MG/DL (ref 70–130)
GLUCOSE BLDC GLUCOMTR-MCNC: 193 MG/DL (ref 70–130)
GLUCOSE BLDC GLUCOMTR-MCNC: 201 MG/DL (ref 70–130)
GLUCOSE BLDC GLUCOMTR-MCNC: 203 MG/DL (ref 70–130)
GLUCOSE BLDC GLUCOMTR-MCNC: 203 MG/DL (ref 70–130)
GLUCOSE BLDC GLUCOMTR-MCNC: 209 MG/DL (ref 70–130)
GLUCOSE BLDC GLUCOMTR-MCNC: 217 MG/DL (ref 70–130)
GLUCOSE BLDC GLUCOMTR-MCNC: 222 MG/DL (ref 70–130)
GLUCOSE BLDC GLUCOMTR-MCNC: 230 MG/DL (ref 70–130)
GLUCOSE BLDC GLUCOMTR-MCNC: 244 MG/DL (ref 70–130)
GLUCOSE BLDC GLUCOMTR-MCNC: 244 MG/DL (ref 70–130)
GLUCOSE BLDC GLUCOMTR-MCNC: 246 MG/DL (ref 70–130)
GLUCOSE BLDC GLUCOMTR-MCNC: 247 MG/DL (ref 70–130)
GLUCOSE BLDC GLUCOMTR-MCNC: 251 MG/DL (ref 70–130)
GLUCOSE BLDC GLUCOMTR-MCNC: 251 MG/DL (ref 70–130)
GLUCOSE BLDC GLUCOMTR-MCNC: 257 MG/DL (ref 70–130)
GLUCOSE BLDC GLUCOMTR-MCNC: 264 MG/DL (ref 70–130)
GLUCOSE BLDC GLUCOMTR-MCNC: 274 MG/DL (ref 70–130)
GLUCOSE SERPL-MCNC: 204 MG/DL (ref 65–99)
GLUCOSE SERPL-MCNC: 268 MG/DL (ref 65–99)
GLUCOSE SERPL-MCNC: NORMAL MG/DL
HBA1C MFR BLD: NORMAL %
HCT VFR BLD AUTO: 51.7 % (ref 37.5–51)
HDLC SERPL-MCNC: 17 MG/DL (ref 40–60)
HGB BLD-MCNC: 18.7 G/DL (ref 13–17.7)
IMM GRANULOCYTES # BLD AUTO: 0.06 10*3/MM3 (ref 0–0.05)
IMM GRANULOCYTES NFR BLD AUTO: 0.6 % (ref 0–0.5)
LDH SERPL-CCNC: NORMAL U/L
LDLC SERPL CALC-MCNC: ABNORMAL MG/DL
LDLC/HDLC SERPL: ABNORMAL {RATIO}
LIPASE SERPL-CCNC: 1505 U/L (ref 13–60)
LIPASE SERPL-CCNC: 2034 U/L (ref 13–60)
LIPASE SERPL-CCNC: >3000 U/L (ref 13–60)
LYMPHOCYTES # BLD AUTO: 0.92 10*3/MM3 (ref 0.7–3.1)
LYMPHOCYTES NFR BLD AUTO: 8.7 % (ref 19.6–45.3)
MAGNESIUM SERPL-MCNC: 2.1 MG/DL (ref 1.6–2.6)
MAGNESIUM SERPL-MCNC: NORMAL MG/DL
MCH RBC QN AUTO: 32.5 PG (ref 26.6–33)
MCHC RBC AUTO-ENTMCNC: 36.2 G/DL (ref 31.5–35.7)
MCV RBC AUTO: 89.9 FL (ref 79–97)
MICROCYTES BLD QL: NORMAL
MONOCYTES # BLD AUTO: 0.76 10*3/MM3 (ref 0.1–0.9)
MONOCYTES NFR BLD AUTO: 7.2 % (ref 5–12)
NEUTROPHILS NFR BLD AUTO: 8.76 10*3/MM3 (ref 1.7–7)
NEUTROPHILS NFR BLD AUTO: 82.5 % (ref 42.7–76)
NRBC BLD AUTO-RTO: 0 /100 WBC (ref 0–0.2)
PHOSPHATE SERPL-MCNC: 2.4 MG/DL (ref 2.5–4.5)
PHOSPHATE SERPL-MCNC: 3.1 MG/DL (ref 2.5–4.5)
PLAT MORPH BLD: NORMAL
PLATELET # BLD AUTO: 312 10*3/MM3 (ref 140–450)
PMV BLD AUTO: 10.6 FL (ref 6–12)
POTASSIUM SERPL-SCNC: 4.7 MMOL/L (ref 3.5–5.2)
POTASSIUM SERPL-SCNC: 4.7 MMOL/L (ref 3.5–5.2)
POTASSIUM SERPL-SCNC: NORMAL MMOL/L
PROCALCITONIN SERPL-MCNC: NORMAL NG/ML
PROT SERPL-MCNC: 5.6 G/DL (ref 6–8.5)
PROT SERPL-MCNC: NORMAL G/DL
QT INTERVAL: 350 MS
QTC INTERVAL: 464 MS
RBC # BLD AUTO: 5.75 10*6/MM3 (ref 4.14–5.8)
SODIUM SERPL-SCNC: 131 MMOL/L (ref 136–145)
SODIUM SERPL-SCNC: 134 MMOL/L (ref 136–145)
SODIUM SERPL-SCNC: NORMAL MMOL/L
TRIGL SERPL-MCNC: 3142 MG/DL (ref 0–150)
TRIGL SERPL-MCNC: 869 MG/DL (ref 0–150)
TRIGL SERPL-MCNC: >4425 MG/DL (ref 0–150)
TRIGL SERPL-MCNC: >4425 MG/DL (ref 0–150)
TROPONIN T NUMERIC DELTA: NORMAL
TROPONIN T SERPL HS-MCNC: NORMAL NG/L
VLDLC SERPL-MCNC: ABNORMAL MG/DL
WBC NRBC COR # BLD AUTO: 10.6 10*3/MM3 (ref 3.4–10.8)

## 2024-12-17 PROCEDURE — 83690 ASSAY OF LIPASE: CPT | Performed by: STUDENT IN AN ORGANIZED HEALTH CARE EDUCATION/TRAINING PROGRAM

## 2024-12-17 PROCEDURE — 94799 UNLISTED PULMONARY SVC/PX: CPT

## 2024-12-17 PROCEDURE — 84100 ASSAY OF PHOSPHORUS: CPT | Performed by: HOSPITALIST

## 2024-12-17 PROCEDURE — 82948 REAGENT STRIP/BLOOD GLUCOSE: CPT

## 2024-12-17 PROCEDURE — 25810000003 SODIUM CHLORIDE 0.9 % SOLUTION: Performed by: HOSPITALIST

## 2024-12-17 PROCEDURE — 25010000002 PROCHLORPERAZINE 10 MG/2ML SOLUTION: Performed by: HOSPITALIST

## 2024-12-17 PROCEDURE — 25010000002 HYDROMORPHONE 1 MG/ML SOLUTION: Performed by: HOSPITALIST

## 2024-12-17 PROCEDURE — 83690 ASSAY OF LIPASE: CPT | Performed by: HOSPITALIST

## 2024-12-17 PROCEDURE — 25010000002 ONDANSETRON PER 1 MG: Performed by: HOSPITALIST

## 2024-12-17 PROCEDURE — 84478 ASSAY OF TRIGLYCERIDES: CPT | Performed by: HOSPITALIST

## 2024-12-17 PROCEDURE — 99232 SBSQ HOSP IP/OBS MODERATE 35: CPT | Performed by: STUDENT IN AN ORGANIZED HEALTH CARE EDUCATION/TRAINING PROGRAM

## 2024-12-17 PROCEDURE — 85025 COMPLETE CBC W/AUTO DIFF WBC: CPT | Performed by: HOSPITALIST

## 2024-12-17 PROCEDURE — 83605 ASSAY OF LACTIC ACID: CPT | Performed by: HOSPITALIST

## 2024-12-17 PROCEDURE — 25810000003 SODIUM CHLORIDE 0.9 % SOLUTION: Performed by: STUDENT IN AN ORGANIZED HEALTH CARE EDUCATION/TRAINING PROGRAM

## 2024-12-17 PROCEDURE — 85007 BL SMEAR W/DIFF WBC COUNT: CPT | Performed by: HOSPITALIST

## 2024-12-17 PROCEDURE — 36415 COLL VENOUS BLD VENIPUNCTURE: CPT | Performed by: HOSPITALIST

## 2024-12-17 PROCEDURE — 94761 N-INVAS EAR/PLS OXIMETRY MLT: CPT

## 2024-12-17 PROCEDURE — 80053 COMPREHEN METABOLIC PANEL: CPT | Performed by: HOSPITALIST

## 2024-12-17 PROCEDURE — 83735 ASSAY OF MAGNESIUM: CPT | Performed by: HOSPITALIST

## 2024-12-17 RX ORDER — CHLORAL HYDRATE 500 MG
1000 CAPSULE ORAL
COMMUNITY

## 2024-12-17 RX ORDER — FENOFIBRATE 145 MG/1
145 TABLET, COATED ORAL NIGHTLY
Status: CANCELLED | OUTPATIENT
Start: 2024-12-17

## 2024-12-17 RX ORDER — NITROGLYCERIN 0.4 MG/1
0.4 TABLET SUBLINGUAL
Status: DISCONTINUED | OUTPATIENT
Start: 2024-12-17 | End: 2024-12-22 | Stop reason: HOSPADM

## 2024-12-17 RX ORDER — DEXTROSE MONOHYDRATE 100 MG/ML
1-2 INJECTION, SOLUTION INTRAVENOUS CONTINUOUS
Status: DISPENSED | OUTPATIENT
Start: 2024-12-17 | End: 2024-12-18

## 2024-12-17 RX ORDER — BISACODYL 5 MG/1
5 TABLET, DELAYED RELEASE ORAL DAILY PRN
Status: DISCONTINUED | OUTPATIENT
Start: 2024-12-17 | End: 2024-12-22 | Stop reason: HOSPADM

## 2024-12-17 RX ORDER — SODIUM CHLORIDE 0.9 % (FLUSH) 0.9 %
10 SYRINGE (ML) INJECTION EVERY 12 HOURS SCHEDULED
Status: DISCONTINUED | OUTPATIENT
Start: 2024-12-17 | End: 2024-12-22 | Stop reason: HOSPADM

## 2024-12-17 RX ORDER — DEXTROSE MONOHYDRATE 100 MG/ML
1-2 INJECTION, SOLUTION INTRAVENOUS
Status: DISCONTINUED | OUTPATIENT
Start: 2024-12-17 | End: 2024-12-17

## 2024-12-17 RX ORDER — BISACODYL 10 MG
10 SUPPOSITORY, RECTAL RECTAL DAILY PRN
Status: DISCONTINUED | OUTPATIENT
Start: 2024-12-17 | End: 2024-12-22 | Stop reason: HOSPADM

## 2024-12-17 RX ORDER — DEXTROSE MONOHYDRATE 100 MG/ML
1-2 INJECTION, SOLUTION INTRAVENOUS CONTINUOUS
Status: DISPENSED | OUTPATIENT
Start: 2024-12-17 | End: 2024-12-17

## 2024-12-17 RX ORDER — ASPIRIN 81 MG/1
81 TABLET ORAL DAILY
Status: CANCELLED | OUTPATIENT
Start: 2024-12-17

## 2024-12-17 RX ORDER — AMOXICILLIN 250 MG
2 CAPSULE ORAL 2 TIMES DAILY PRN
Status: DISCONTINUED | OUTPATIENT
Start: 2024-12-17 | End: 2024-12-22 | Stop reason: HOSPADM

## 2024-12-17 RX ORDER — SODIUM CHLORIDE 9 MG/ML
100 INJECTION, SOLUTION INTRAVENOUS CONTINUOUS
Status: DISCONTINUED | OUTPATIENT
Start: 2024-12-17 | End: 2024-12-17

## 2024-12-17 RX ORDER — SODIUM CHLORIDE 9 MG/ML
40 INJECTION, SOLUTION INTRAVENOUS AS NEEDED
Status: DISCONTINUED | OUTPATIENT
Start: 2024-12-17 | End: 2024-12-22 | Stop reason: HOSPADM

## 2024-12-17 RX ORDER — SODIUM CHLORIDE 0.9 % (FLUSH) 0.9 %
10 SYRINGE (ML) INJECTION AS NEEDED
Status: DISCONTINUED | OUTPATIENT
Start: 2024-12-17 | End: 2024-12-22 | Stop reason: HOSPADM

## 2024-12-17 RX ORDER — ONDANSETRON 2 MG/ML
4 INJECTION INTRAMUSCULAR; INTRAVENOUS EVERY 6 HOURS PRN
Status: DISCONTINUED | OUTPATIENT
Start: 2024-12-17 | End: 2024-12-22 | Stop reason: HOSPADM

## 2024-12-17 RX ORDER — NALOXONE HCL 0.4 MG/ML
0.4 VIAL (ML) INJECTION
Status: DISCONTINUED | OUTPATIENT
Start: 2024-12-17 | End: 2024-12-20

## 2024-12-17 RX ORDER — SODIUM CHLORIDE 9 MG/ML
100 INJECTION, SOLUTION INTRAVENOUS CONTINUOUS
Status: ACTIVE | OUTPATIENT
Start: 2024-12-17 | End: 2024-12-18

## 2024-12-17 RX ORDER — ASPIRIN 81 MG/1
81 TABLET ORAL DAILY
Status: DISCONTINUED | OUTPATIENT
Start: 2024-12-17 | End: 2024-12-22 | Stop reason: HOSPADM

## 2024-12-17 RX ORDER — PROCHLORPERAZINE EDISYLATE 5 MG/ML
2.5 INJECTION INTRAMUSCULAR; INTRAVENOUS EVERY 6 HOURS PRN
Status: DISCONTINUED | OUTPATIENT
Start: 2024-12-17 | End: 2024-12-20

## 2024-12-17 RX ORDER — POLYETHYLENE GLYCOL 3350 17 G/17G
17 POWDER, FOR SOLUTION ORAL DAILY PRN
Status: DISCONTINUED | OUTPATIENT
Start: 2024-12-17 | End: 2024-12-22 | Stop reason: HOSPADM

## 2024-12-17 RX ORDER — SODIUM CHLORIDE 9 MG/ML
100 INJECTION, SOLUTION INTRAVENOUS CONTINUOUS
Status: ACTIVE | OUTPATIENT
Start: 2024-12-17 | End: 2024-12-17

## 2024-12-17 RX ADMIN — HYDROMORPHONE HYDROCHLORIDE 1 MG: 1 INJECTION, SOLUTION INTRAMUSCULAR; INTRAVENOUS; SUBCUTANEOUS at 17:08

## 2024-12-17 RX ADMIN — ONDANSETRON 4 MG: 2 INJECTION INTRAMUSCULAR; INTRAVENOUS at 18:11

## 2024-12-17 RX ADMIN — HYDROMORPHONE HYDROCHLORIDE 1 MG: 1 INJECTION, SOLUTION INTRAMUSCULAR; INTRAVENOUS; SUBCUTANEOUS at 13:14

## 2024-12-17 RX ADMIN — PROCHLORPERAZINE EDISYLATE 2.5 MG: 5 INJECTION INTRAMUSCULAR; INTRAVENOUS at 03:52

## 2024-12-17 RX ADMIN — ONDANSETRON 4 MG: 2 INJECTION INTRAMUSCULAR; INTRAVENOUS at 00:33

## 2024-12-17 RX ADMIN — MUPIROCIN 1 APPLICATION: 20 OINTMENT TOPICAL at 20:45

## 2024-12-17 RX ADMIN — Medication 10 ML: at 20:45

## 2024-12-17 RX ADMIN — HYDROMORPHONE HYDROCHLORIDE 1 MG: 1 INJECTION, SOLUTION INTRAMUSCULAR; INTRAVENOUS; SUBCUTANEOUS at 00:28

## 2024-12-17 RX ADMIN — HYDROMORPHONE HYDROCHLORIDE 1 MG: 1 INJECTION, SOLUTION INTRAMUSCULAR; INTRAVENOUS; SUBCUTANEOUS at 20:45

## 2024-12-17 RX ADMIN — HYDROMORPHONE HYDROCHLORIDE 1 MG: 1 INJECTION, SOLUTION INTRAMUSCULAR; INTRAVENOUS; SUBCUTANEOUS at 03:58

## 2024-12-17 RX ADMIN — INSULIN HUMAN 0.05 UNITS/KG/HR: 1 INJECTION, SOLUTION INTRAVENOUS at 16:26

## 2024-12-17 RX ADMIN — Medication 10 ML: at 03:08

## 2024-12-17 RX ADMIN — PROCHLORPERAZINE EDISYLATE 2.5 MG: 5 INJECTION INTRAMUSCULAR; INTRAVENOUS at 18:15

## 2024-12-17 RX ADMIN — SODIUM CHLORIDE 100 ML/HR: 9 INJECTION, SOLUTION INTRAVENOUS at 00:33

## 2024-12-17 RX ADMIN — SODIUM CHLORIDE 100 ML/HR: 9 INJECTION, SOLUTION INTRAVENOUS at 13:09

## 2024-12-17 RX ADMIN — MUPIROCIN 1 APPLICATION: 20 OINTMENT TOPICAL at 08:10

## 2024-12-17 RX ADMIN — ONDANSETRON 4 MG: 2 INJECTION INTRAMUSCULAR; INTRAVENOUS at 13:14

## 2024-12-17 RX ADMIN — HYDROMORPHONE HYDROCHLORIDE 1 MG: 1 INJECTION, SOLUTION INTRAMUSCULAR; INTRAVENOUS; SUBCUTANEOUS at 08:18

## 2024-12-17 RX ADMIN — Medication 10 ML: at 08:06

## 2024-12-17 NOTE — H&P
Hospitalist History and Physical        Patient Identification  Name: Herminio Matson  Age/Sex: 53 y.o. male  :  1970        MRN: [id number]  Visit Number: [id number]  Admit Date: 2024   PCP: Luma Pittman APRN          Chief complaint abdominal pain, nausea/vomiting    History of Present Illness:  Patient is a 53 y.o. male with history of anxiety, HTN, type II DM, and pancreatitis secondary to severe hypertriglyceridemia for which he was previously hospitalized in 23, requiring insulin drip to bring down triglyceride levels. He presents today with complaints of acute onset abdominal pain and nausea/vomiting that started earlier this morning. Patient reports compliance with fenofibrate and fish oil for his hypertriglyceridemia. In the ED, patient was tachycardic but other vitals were fairly stable. Labs revealed normal troponin x2, sodium 120, K+ 3.9, cl 83, glucose 230, alk phos 68, albumin 3.4, total bilirubin 1.7, and unable to calculate most other CMP values due to lipemia. Total cholesterol was 1185, HDL 17, and TG >4425. Lactate was 2.9 with reflex 1.7, lipase >3000, procal 1.23, WBC 16.98. CT abdomen/pelvis showed moderate surrounding edema consistent with acute pancreatitis. Patient received IV dilaudid, zofran, and compazine in the ED, along with IV zosyn and total of 3859cc IV NS bolus.    Review of Systems  Review of Systems   Constitutional:  Negative for activity change, appetite change, chills, diaphoresis, fatigue and fever.   HENT:  Negative for congestion, postnasal drip, rhinorrhea, sinus pressure, sinus pain and sore throat.    Eyes:  Negative for photophobia and visual disturbance.   Respiratory:  Negative for cough, shortness of breath and wheezing.    Cardiovascular:  Negative for chest pain, palpitations and leg swelling.   Gastrointestinal:  Positive for abdominal pain, nausea and vomiting. Negative for abdominal distention, constipation and diarrhea.   Genitourinary:   Negative for difficulty urinating, dysuria, flank pain, frequency and hematuria.   Musculoskeletal:  Negative for arthralgias, back pain, joint swelling and myalgias.   Skin:  Negative for color change, pallor, rash and wound.   Neurological:  Negative for dizziness, tremors, seizures, syncope, weakness, light-headedness, numbness and headaches.   Hematological:  Negative for adenopathy. Does not bruise/bleed easily.   Psychiatric/Behavioral:  Negative for agitation, behavioral problems and confusion.        History  Past Medical History:   Diagnosis Date    Anxiety     Hypertension     Pancreatitis      No past surgical history on file.  No family history on file.  Social History     Tobacco Use    Smoking status: Never    Smokeless tobacco: Never   Vaping Use    Vaping status: Never Used   Substance Use Topics    Alcohol use: Never    Drug use: Never     (Not in a hospital admission)    Allergies:  Patient has no known allergies.    Objective     Vital Signs  Temp:  [98 °F (36.7 °C)-98.8 °F (37.1 °C)] 98.8 °F (37.1 °C)  Heart Rate:  [100-120] 120  Resp:  [16-20] 20  BP: (121-150)/(83-99) 121/94  Body mass index is 28.48 kg/m².    Physical Exam:  Physical Exam  Constitutional:       General: He is not in acute distress.     Appearance: Normal appearance. He is ill-appearing.   HENT:      Head: Normocephalic and atraumatic.      Right Ear: External ear normal.      Left Ear: External ear normal.      Nose: Nose normal.      Mouth/Throat:      Mouth: Mucous membranes are dry.      Pharynx: Oropharynx is clear.   Eyes:      Extraocular Movements: Extraocular movements intact.      Conjunctiva/sclera: Conjunctivae normal.      Pupils: Pupils are equal, round, and reactive to light.   Cardiovascular:      Rate and Rhythm: Regular rhythm. Tachycardia present.      Pulses: Normal pulses.      Heart sounds: Normal heart sounds. No murmur heard.  Pulmonary:      Effort: Pulmonary effort is normal. No respiratory distress.       Breath sounds: Normal breath sounds. No wheezing or rales.   Abdominal:      General: Abdomen is flat. Bowel sounds are normal. There is no distension.      Palpations: Abdomen is soft.      Tenderness: There is abdominal tenderness (epigastric region).   Musculoskeletal:         General: Normal range of motion.      Cervical back: Normal range of motion and neck supple. No tenderness.      Right lower leg: No edema.      Left lower leg: No edema.   Lymphadenopathy:      Cervical: No cervical adenopathy.   Skin:     General: Skin is dry.      Capillary Refill: Capillary refill takes less than 2 seconds.      Coloration: Skin is not jaundiced.      Findings: No bruising or lesion.   Neurological:      General: No focal deficit present.      Mental Status: He is alert and oriented to person, place, and time.   Psychiatric:         Mood and Affect: Mood normal.         Behavior: Behavior normal.         Thought Content: Thought content normal.           Results Review:       Lab Results:  Results from last 7 days   Lab Units 12/16/24  1658   WBC 10*3/mm3 16.98*   HEMOGLOBIN g/dL 18.0*   PLATELETS 10*3/mm3 267         Results from last 7 days   Lab Units 12/16/24 2001 12/16/24  1658   SODIUM mmol/L  --  120*   POTASSIUM mmol/L  --  3.9   CHLORIDE mmol/L  --  83*   CREATININE mg/dL 0.80  --    GLUCOSE mg/dL  --  230*     Results from last 7 days   Lab Units 12/16/24  2215   MAGNESIUM mg/dL 1.8     Hemoglobin A1C   Date Value Ref Range Status   12/16/2024 8.80 (H) 4.80 - 5.60 % Final     Results from last 7 days   Lab Units 12/16/24  1658   BILIRUBIN mg/dL 1.7*   ALK PHOS U/L 68     Results from last 7 days   Lab Units 12/16/24 2011 12/16/24  1658   HSTROP T ng/L <6 10         Results from last 7 days   Lab Units 12/16/24  1935   INR  0.97           I have reviewed the patient's laboratory results.    Imaging:  Imaging Results (Last 72 Hours)       Procedure Component Value Units Date/Time    CT Abdomen Pelvis With  Contrast [[id number]] Collected: 12/16/24 2110     Updated: 12/16/24 2119    Narrative:      Procedure: Helical CT Abdomen and Pelvis examination performed with  axial sections after administration of non-ionic IV contrast. Coronal  and sagittal 3 mm thick reformats also acquired. CT scan performed  according to ALARA(as low as reasonably achievable)dose protocol.     Comparison: 9/5/2023     History: pancreatitis     Date: 12/16/2024 8:29 PM     Findings:     Liver: Heaptic steatosis.  No focal lesion.  Gallbladder: Normal. No evidence of stones.  Spleen: Normal in size.  Pancreas: Moderate surrounding edema.  Adrenal glands: No nodules.  Kidneys: Symmetric bilateral renal enhancement is present. No  hydronephrosis.  Peritoneum: There is no free air or abscess.  Bowel: No obstruction. No evidence of inflamed bowel loops. Appendix is  normal.  Mesentery: No adenopathy.  Retroperitoneum: Aorta and inferior vena cava unremarkable.  Pelvis: Bladder is unremarkable.  Bones: No acute fracture.  Lung bases: Visualized lung bases are negative. Heart is normal in size.       Impression:         Acute pancreatitis.      This report was finalized on 12/16/2024 9:17 PM by Leonard Morales MD.       XR Chest 1 View [[id number]] Collected: 12/16/24 2107     Updated: 12/16/24 2109    Narrative:      Procedure: Frontal view of chest obtained.     Comparison: None available     History: Severe Sepsis Protocol     Findings:     No pneumonia or acute process seen in the chest.  Normal heart size and mediastinal contours.  Trachea is in midline position.  No edema or effusion is seen.  There is no evidence of pneumothorax.       Impression:         No evidence of acute disease in the chest.     This report was finalized on 12/16/2024 9:07 PM by Leonard Morales MD.               I have personally reviewed the patient's radiologic imaging.        EKG:   Sinus tachycardia, , QTc 464  Possible Left atrial enlargement  Incomplete right  bundle branch block  Cannot rule out Inferior infarct , age undetermined  Abnormal ECG  When compared with ECG of 06-Sep-2023 01:44,  Criteria for Septal infarct are no longer present  Minimal criteria for Inferior infarct are now present    I have personally reviewed the patient's EKG.        Assessment & Plan     - Acute pancreatitis, secondary to severe hypertriglyceridemia: admit to PCU for IV insulin infusion with dextrose to drive down triglycerides. Continue to trend triglyceride and lipase levels. Check glucose q1h to avoid hypo- and severe hyperglycemia. Hydrate with IV NS. IV analgesics and antiemetics available PRN. NPO status for now. ED checked with [facility] as patient has several worrisome features including tachycardia, lactic acidosis and leukocytosis, which can be indications for consideration of plasmapheresis. [facility] felt patient did not require said intervention at this time, however, and recommended insulin drip only for now.   - SIRS, present on admission with tachycardia, leukocytosis and lactic acidosis, suspect secondary to above. No obvious source of infection identified at this time. Pancreas shows no signs of necrosis to warrant antibiotics currently. Continue to treat pancreatitis as detailed above.   - Hyponatremia, suspect pseudohyponatremia secondary to severely elevated cholesterol and triglyceride levels. Continue to monitor sodium closely.   - Type II DM: monitor accuchecks q1h while on IV insulin infusion. IV dextrose also infusing to avoid hypoglycemia.    DVT Prophylaxis: SCDs    Estimated Length of Stay >2 midnights    I discussed the patient's findings, assessment and plan with the patient, his wife at bedside, and ED provider Dr Gong    Patient is high risk due to acute pancreatitis secondary to severe hypertriglyceridemia, SIRS    Paolo Valentine MD  12/16/24  23:47 EST

## 2024-12-17 NOTE — ED PROVIDER NOTES
Subjective   History of Present Illness  53-year-old male with a history of pancreatitis and hypertension presents to the ED with acute upper quadrant pain.  Symptoms started earlier this morning.  He has been having nausea vomiting upper quadrant pain inability to eat anything.  He states it feels like another pancreatitis episode.  Denies fever but admits chills.      Review of Systems   Constitutional:  Positive for appetite change and fatigue. Negative for fever.   HENT: Negative.     Respiratory: Negative.     Cardiovascular: Negative.  Negative for chest pain.   Gastrointestinal:  Positive for abdominal pain, nausea and vomiting. Negative for abdominal distention, anal bleeding, blood in stool, constipation, diarrhea and rectal pain.   Endocrine: Negative.    Genitourinary: Negative.  Negative for dysuria.   Skin: Negative.    Neurological: Negative.    Psychiatric/Behavioral: Negative.     All other systems reviewed and are negative.      Past Medical History:   Diagnosis Date    Anxiety     Hypertension     Pancreatitis        No Known Allergies    No past surgical history on file.    No family history on file.    Social History     Socioeconomic History    Marital status:    Tobacco Use    Smoking status: Never    Smokeless tobacco: Never   Vaping Use    Vaping status: Never Used   Substance and Sexual Activity    Alcohol use: Never    Drug use: Never    Sexual activity: Yes           Objective   Physical Exam  Vitals and nursing note reviewed.   Constitutional:       General: He is in acute distress.      Appearance: He is well-developed. He is ill-appearing, toxic-appearing and diaphoretic.   HENT:      Head: Normocephalic and atraumatic.      Right Ear: External ear normal.      Left Ear: External ear normal.      Nose: Nose normal.   Eyes:      Conjunctiva/sclera: Conjunctivae normal.      Pupils: Pupils are equal, round, and reactive to light.   Neck:      Vascular: No JVD.      Trachea: No  tracheal deviation.   Cardiovascular:      Rate and Rhythm: Normal rate and regular rhythm.      Heart sounds: Normal heart sounds. No murmur heard.  Pulmonary:      Effort: Pulmonary effort is normal. No respiratory distress.      Breath sounds: Normal breath sounds. No wheezing.   Abdominal:      General: Bowel sounds are normal.      Palpations: Abdomen is soft.      Tenderness: There is abdominal tenderness in the right upper quadrant and epigastric area.   Musculoskeletal:         General: No deformity. Normal range of motion.      Cervical back: Normal range of motion and neck supple.   Skin:     General: Skin is warm and dry.      Coloration: Skin is not pale.      Findings: No erythema or rash.   Neurological:      Mental Status: He is alert and oriented to person, place, and time.      Cranial Nerves: No cranial nerve deficit.   Psychiatric:         Behavior: Behavior normal.         Thought Content: Thought content normal.         Procedures           ED Course  ED Course as of 12/17/24 0048   Mon Dec 16, 2024   1945 EKG interpretation sinus tachycardia 106 bpm QTc is 464 incomplete right bundle branch block no ST elevations or depressions.  Electronically signed by Edgard Gong DO, 12/16/24, 7:45 PM EST.   [GF]      ED Course User Index  [GF] Edgard Gong DO                                                       Medical Decision Making  Patient found to have severe hypertriglyceridemia, CT scan shows acute pancreatitis.  Case presented to Dr. Valentine as well as [facility] in consultation to discuss whether or not patient would require plasmapheresis.  At this time patient does not meet criteria.  Dr. Valentine to admit the patient for further care.  Discussed this with patient at bedside all questions answered.    Problems Addressed:  Acute pancreatitis, unspecified complication status, unspecified pancreatitis type: complicated acute illness or injury    Amount and/or Complexity of  Data Reviewed  Labs: ordered.  Radiology: ordered.  ECG/medicine tests: ordered.    Risk  Prescription drug management.  Decision regarding hospitalization.        Final diagnoses:   Acute pancreatitis, unspecified complication status, unspecified pancreatitis type       ED Disposition  ED Disposition       ED Disposition   Decision to Admit    Condition   --    Comment   Level of Care: Progressive Care [20]   Diagnosis: Acute pancreatitis [577.0.ICD-9-CM]   Admitting Physician: CODY LAFLEUR [[id number]]   Attending Physician: CODY LAFLEUR [[id number]]   Certification: I Certify That Inpatient Hospital Services Are Medically Necessary For Greater Than 2 Midnights                 No follow-up provider specified.       Medication List      No changes were made to your prescriptions during this visit.            Edgard Gong, DO  12/17/24 0051

## 2024-12-17 NOTE — PLAN OF CARE
Goal Outcome Evaluation:  Plan of Care Reviewed With: patient        Progress: no change        Pt admitted tonight to CCU. He is currently alert and oriented. Pt on Insulin gtt. He is currently sinus tach on the monitor. Pt is currently resting with call light near by, VSS, care ongoing.

## 2024-12-17 NOTE — PROGRESS NOTES
[facility] Physician - Brief Progress Note  PERMANENT  12/17/2024 03:07    [facility] [place] - CCU - 10 - [place], [place] ([facility])    KYLIE BARTON    Date of Service 12/17/2024 03:07    HPI/Events of Note [facility] Provider Assessment Note    53-year-old male admitted to the ICU with hypertriglyceridemia induced pancreatitis.  Patient was has been started on insulin drip.    PMHx:  Pancreatitis, hypertension, hypertriglyceridemia, diabetes    CT abdomen pelvis:  Acute pancreatitis     CXR:  No infiltrate or edema    EKG:  Sinus tachycardia 106 beats per minute.  .  Incomplete right bundle-branch block.          Assessment and Plan:    1. HOB elevated, aspiration precautions.  NPO  2. Continuous telemetry   3. Insulin drip with serial BMPs   4. Dextrose fluids to maintain glucose > 200  5. Pain control and incentive spirometry   6. IV fluids  7. Monitor I/Os and electrolytes    8. Serial triglyceride levels until < 500.    9. Continue home Fenofibrate once able to take PO      *******Portions of this note may be dictated using [employer] voice recognition software. Variances in spelling and vocabulary are possible and unintentional. Not all errors may be caught and/or corrected.          __Y___   Video Assessment performed  __Y___   Most recent labs reviewed  __Y___   Vital Signs reviewed  __Y___   Best Practices addressed:                 VTE prophylaxis:  SCDs                 SUP (when indicated):  N/A                 Current Glucose:  Insulin drip                      Please notify bedside physician when present or [facility] if glc > 180 X 2                 Sepsis guidelines:  N/A                 Lung protective strategy:  N/A                 Targeted Temperature Management:  N/A    __N___     Spoke with bedside RN  __N___     Orders written      [facility] any [facility] if bedside physician is not present.      Interventions Minor-Routine modifications to care plan (e.g.  PRN medications for pain, fever)        Electronically Signed by: Zurdo Mendoza) on 12/17/2024 03:15

## 2024-12-17 NOTE — PLAN OF CARE
Goal Outcome Evaluation:  Plan of Care Reviewed With: patient, spouse        Progress: no change  Outcome Evaluation: Patient AOx4. VSS, afebrile. RA. NSR. Care ongoing.

## 2024-12-18 LAB
ANION GAP SERPL CALCULATED.3IONS-SCNC: 10.2 MMOL/L (ref 5–15)
ANION GAP SERPL CALCULATED.3IONS-SCNC: 11.7 MMOL/L (ref 5–15)
BUN SERPL-MCNC: 22 MG/DL (ref 6–20)
BUN SERPL-MCNC: 31 MG/DL (ref 6–20)
BUN/CREAT SERPL: 28.2 (ref 7–25)
BUN/CREAT SERPL: 30.4 (ref 7–25)
CALCIUM SPEC-SCNC: 7.4 MG/DL (ref 8.6–10.5)
CALCIUM SPEC-SCNC: 7.7 MG/DL (ref 8.6–10.5)
CHLORIDE SERPL-SCNC: 100 MMOL/L (ref 98–107)
CHLORIDE SERPL-SCNC: 95 MMOL/L (ref 98–107)
CO2 SERPL-SCNC: 17.8 MMOL/L (ref 22–29)
CO2 SERPL-SCNC: 18.3 MMOL/L (ref 22–29)
CREAT SERPL-MCNC: 0.78 MG/DL (ref 0.76–1.27)
CREAT SERPL-MCNC: 1.02 MG/DL (ref 0.76–1.27)
EGFRCR SERPLBLD CKD-EPI 2021: 105.3 ML/MIN/1.73
EGFRCR SERPLBLD CKD-EPI 2021: 86.8 ML/MIN/1.73
GLUCOSE BLDC GLUCOMTR-MCNC: 148 MG/DL (ref 70–130)
GLUCOSE BLDC GLUCOMTR-MCNC: 154 MG/DL (ref 70–130)
GLUCOSE BLDC GLUCOMTR-MCNC: 157 MG/DL (ref 70–130)
GLUCOSE BLDC GLUCOMTR-MCNC: 159 MG/DL (ref 70–130)
GLUCOSE BLDC GLUCOMTR-MCNC: 160 MG/DL (ref 70–130)
GLUCOSE BLDC GLUCOMTR-MCNC: 160 MG/DL (ref 70–130)
GLUCOSE BLDC GLUCOMTR-MCNC: 162 MG/DL (ref 70–130)
GLUCOSE BLDC GLUCOMTR-MCNC: 166 MG/DL (ref 70–130)
GLUCOSE BLDC GLUCOMTR-MCNC: 172 MG/DL (ref 70–130)
GLUCOSE BLDC GLUCOMTR-MCNC: 172 MG/DL (ref 70–130)
GLUCOSE BLDC GLUCOMTR-MCNC: 173 MG/DL (ref 70–130)
GLUCOSE BLDC GLUCOMTR-MCNC: 177 MG/DL (ref 70–130)
GLUCOSE BLDC GLUCOMTR-MCNC: 179 MG/DL (ref 70–130)
GLUCOSE BLDC GLUCOMTR-MCNC: 180 MG/DL (ref 70–130)
GLUCOSE BLDC GLUCOMTR-MCNC: 181 MG/DL (ref 70–130)
GLUCOSE BLDC GLUCOMTR-MCNC: 185 MG/DL (ref 70–130)
GLUCOSE BLDC GLUCOMTR-MCNC: 188 MG/DL (ref 70–130)
GLUCOSE BLDC GLUCOMTR-MCNC: 190 MG/DL (ref 70–130)
GLUCOSE BLDC GLUCOMTR-MCNC: 191 MG/DL (ref 70–130)
GLUCOSE BLDC GLUCOMTR-MCNC: 196 MG/DL (ref 70–130)
GLUCOSE BLDC GLUCOMTR-MCNC: 200 MG/DL (ref 70–130)
GLUCOSE BLDC GLUCOMTR-MCNC: 202 MG/DL (ref 70–130)
GLUCOSE BLDC GLUCOMTR-MCNC: 206 MG/DL (ref 70–130)
GLUCOSE SERPL-MCNC: 196 MG/DL (ref 65–99)
GLUCOSE SERPL-MCNC: 196 MG/DL (ref 65–99)
MAGNESIUM SERPL-MCNC: 1.8 MG/DL (ref 1.6–2.6)
PHOSPHATE SERPL-MCNC: 2.2 MG/DL (ref 2.5–4.5)
POTASSIUM SERPL-SCNC: 4.1 MMOL/L (ref 3.5–5.2)
POTASSIUM SERPL-SCNC: 4.3 MMOL/L (ref 3.5–5.2)
SODIUM SERPL-SCNC: 125 MMOL/L (ref 136–145)
SODIUM SERPL-SCNC: 128 MMOL/L (ref 136–145)
TRIGL SERPL-MCNC: 2396 MG/DL (ref 0–150)

## 2024-12-18 PROCEDURE — 25010000002 ONDANSETRON PER 1 MG: Performed by: HOSPITALIST

## 2024-12-18 PROCEDURE — 99232 SBSQ HOSP IP/OBS MODERATE 35: CPT | Performed by: STUDENT IN AN ORGANIZED HEALTH CARE EDUCATION/TRAINING PROGRAM

## 2024-12-18 PROCEDURE — 94761 N-INVAS EAR/PLS OXIMETRY MLT: CPT

## 2024-12-18 PROCEDURE — 25010000002 HYDROMORPHONE 1 MG/ML SOLUTION: Performed by: HOSPITALIST

## 2024-12-18 PROCEDURE — 84478 ASSAY OF TRIGLYCERIDES: CPT | Performed by: HOSPITALIST

## 2024-12-18 PROCEDURE — 25810000003 SODIUM CHLORIDE 0.9 % SOLUTION: Performed by: STUDENT IN AN ORGANIZED HEALTH CARE EDUCATION/TRAINING PROGRAM

## 2024-12-18 PROCEDURE — 82948 REAGENT STRIP/BLOOD GLUCOSE: CPT

## 2024-12-18 PROCEDURE — 83735 ASSAY OF MAGNESIUM: CPT | Performed by: HOSPITALIST

## 2024-12-18 PROCEDURE — 80048 BASIC METABOLIC PNL TOTAL CA: CPT | Performed by: HOSPITALIST

## 2024-12-18 PROCEDURE — 94799 UNLISTED PULMONARY SVC/PX: CPT

## 2024-12-18 PROCEDURE — 84100 ASSAY OF PHOSPHORUS: CPT | Performed by: HOSPITALIST

## 2024-12-18 RX ORDER — DEXTROSE MONOHYDRATE 100 MG/ML
1-2 INJECTION, SOLUTION INTRAVENOUS CONTINUOUS
Status: DISPENSED | OUTPATIENT
Start: 2024-12-18 | End: 2024-12-19

## 2024-12-18 RX ORDER — SODIUM CHLORIDE 1 G/1
1 TABLET ORAL
Status: DISCONTINUED | OUTPATIENT
Start: 2024-12-18 | End: 2024-12-22 | Stop reason: HOSPADM

## 2024-12-18 RX ADMIN — HYDROMORPHONE HYDROCHLORIDE 1 MG: 1 INJECTION, SOLUTION INTRAMUSCULAR; INTRAVENOUS; SUBCUTANEOUS at 06:31

## 2024-12-18 RX ADMIN — Medication 1 G: at 14:55

## 2024-12-18 RX ADMIN — SODIUM CHLORIDE 100 ML/HR: 9 INJECTION, SOLUTION INTRAVENOUS at 00:45

## 2024-12-18 RX ADMIN — DEXTROSE MONOHYDRATE 1 ML/KG/HR: 25 INJECTION, SOLUTION INTRAVENOUS at 00:13

## 2024-12-18 RX ADMIN — HYDROMORPHONE HYDROCHLORIDE 1 MG: 1 INJECTION, SOLUTION INTRAMUSCULAR; INTRAVENOUS; SUBCUTANEOUS at 09:42

## 2024-12-18 RX ADMIN — HYDROMORPHONE HYDROCHLORIDE 1 MG: 1 INJECTION, SOLUTION INTRAMUSCULAR; INTRAVENOUS; SUBCUTANEOUS at 00:20

## 2024-12-18 RX ADMIN — ONDANSETRON 4 MG: 2 INJECTION INTRAMUSCULAR; INTRAVENOUS at 01:52

## 2024-12-18 RX ADMIN — DEXTROSE MONOHYDRATE 0.89 ML/KG/HR: 25 INJECTION, SOLUTION INTRAVENOUS at 06:57

## 2024-12-18 RX ADMIN — ONDANSETRON 4 MG: 2 INJECTION INTRAMUSCULAR; INTRAVENOUS at 16:19

## 2024-12-18 RX ADMIN — MUPIROCIN 1 APPLICATION: 20 OINTMENT TOPICAL at 20:30

## 2024-12-18 RX ADMIN — DEXTROSE MONOHYDRATE 1 ML/KG/HR: 25 INJECTION, SOLUTION INTRAVENOUS at 03:47

## 2024-12-18 RX ADMIN — MUPIROCIN 1 APPLICATION: 20 OINTMENT TOPICAL at 08:20

## 2024-12-18 RX ADMIN — Medication 1 G: at 18:06

## 2024-12-18 RX ADMIN — Medication 10 ML: at 20:30

## 2024-12-18 RX ADMIN — DEXTROSE MONOHYDRATE 0.78 ML/KG/HR: 25 INJECTION, SOLUTION INTRAVENOUS at 13:40

## 2024-12-18 RX ADMIN — HYDROMORPHONE HYDROCHLORIDE 1 MG: 1 INJECTION, SOLUTION INTRAMUSCULAR; INTRAVENOUS; SUBCUTANEOUS at 03:22

## 2024-12-18 RX ADMIN — HYDROMORPHONE HYDROCHLORIDE 1 MG: 1 INJECTION, SOLUTION INTRAMUSCULAR; INTRAVENOUS; SUBCUTANEOUS at 16:06

## 2024-12-18 RX ADMIN — INSULIN HUMAN 0.05 UNITS/KG/HR: 1 INJECTION, SOLUTION INTRAVENOUS at 13:32

## 2024-12-18 RX ADMIN — HYDROMORPHONE HYDROCHLORIDE 1 MG: 1 INJECTION, SOLUTION INTRAMUSCULAR; INTRAVENOUS; SUBCUTANEOUS at 20:30

## 2024-12-18 RX ADMIN — Medication 10 ML: at 08:21

## 2024-12-18 NOTE — PLAN OF CARE
Problem: Adult Inpatient Plan of Care  Goal: Plan of Care Review  Outcome: Progressing  Flowsheets (Taken 12/18/2024 0210)  Progress: no change  Outcome Evaluation: Patient alert and oriented x 4.  Respirations even and unlabored on room air.  Patient receiving pain, and nausea medication PRN per MD orders. Call light is within reach. Care is ongoing.  Goal: Patient-Specific Goal (Individualized)  Outcome: Progressing  Goal: Absence of Hospital-Acquired Illness or Injury  Outcome: Progressing  Intervention: Identify and Manage Fall Risk  Recent Flowsheet Documentation  Taken 12/18/2024 0200 by Mila Stevens RN  Safety Promotion/Fall Prevention: safety round/check completed  Taken 12/18/2024 0100 by Mila Stevens RN  Safety Promotion/Fall Prevention: safety round/check completed  Taken 12/18/2024 0000 by Mila Stevens RN  Safety Promotion/Fall Prevention: safety round/check completed  Taken 12/17/2024 2300 by Mila Stevens RN  Safety Promotion/Fall Prevention: safety round/check completed  Taken 12/17/2024 2200 by Mial Stevens RN  Safety Promotion/Fall Prevention: safety round/check completed  Taken 12/17/2024 2100 by Mila Stevens RN  Safety Promotion/Fall Prevention: safety round/check completed  Taken 12/17/2024 2000 by Mila Stevens RN  Safety Promotion/Fall Prevention: safety round/check completed  Taken 12/17/2024 1900 by Mila Stevens RN  Safety Promotion/Fall Prevention: safety round/check completed  Intervention: Prevent Skin Injury  Recent Flowsheet Documentation  Taken 12/18/2024 0200 by Mila Stevens RN  Body Position: (Patient is up in chair) other (see comments)  Taken 12/18/2024 0000 by Mila Stevens RN  Body Position: (Patient is up in chair) other (see comments)  Taken 12/17/2024 2200 by Mila Stevens RN  Body Position: (Patient sitting up in chair) other (see comments)  Taken 12/17/2024 2000 by Mila Stevens RN  Body Position: (Patient sitting up in chair) other (see  comments)  Intervention: Prevent and Manage VTE (Venous Thromboembolism) Risk  Recent Flowsheet Documentation  Taken 12/17/2024 2000 by Mila Stevens RN  VTE Prevention/Management:   bilateral   SCDs (sequential compression devices) off   patient refused intervention  Intervention: Prevent Infection  Recent Flowsheet Documentation  Taken 12/17/2024 2000 by Mila Stevens RN  Infection Prevention:   environmental surveillance performed   rest/sleep promoted   equipment surfaces disinfected   hand hygiene promoted   single patient room provided   visitors restricted/screened  Goal: Optimal Comfort and Wellbeing  Outcome: Progressing  Intervention: Provide Person-Centered Care  Recent Flowsheet Documentation  Taken 12/17/2024 2000 by Mila Stevens RN  Trust Relationship/Rapport:   care explained   reassurance provided  Goal: Readiness for Transition of Care  Outcome: Progressing     Problem: Skin Injury Risk Increased  Goal: Skin Health and Integrity  Outcome: Progressing  Intervention: Optimize Skin Protection  Recent Flowsheet Documentation  Taken 12/18/2024 0200 by Mila Stevens RN  Head of Bed (HOB) Positioning: HOB at 90 degrees  Taken 12/18/2024 0000 by Mila Stevens RN  Head of Bed (HOB) Positioning: HOB at 90 degrees  Taken 12/17/2024 2200 by Mila Stevens RN  Head of Bed (HOB) Positioning: HOB at 90 degrees  Taken 12/17/2024 2000 by Mila Stevens RN  Activity Management: up in chair  Pressure Reduction Techniques:   weight shift assistance provided   frequent weight shift encouraged   heels elevated off bed   positioned off wounds   pressure points protected  Head of Bed (HOB) Positioning: HOB at 90 degrees  Pressure Reduction Devices:   specialty bed utilized   pressure-redistributing mattress utilized   positioning supports utilized   heel offloading device utilized   alternating pressure pump (ERIN)     Problem: Sepsis/Septic Shock  Goal: Optimal Coping  Outcome: Progressing  Intervention: Support  Patient and Family Response  Recent Flowsheet Documentation  Taken 12/17/2024 2000 by Mila Stevens RN  Family/Support System Care: support provided  Goal: Absence of Bleeding  Outcome: Progressing  Goal: Blood Glucose Level Within Target Range  Outcome: Progressing  Intervention: Optimize Glycemic Control  Recent Flowsheet Documentation  Taken 12/17/2024 2000 by Mila Stevens RN  Hyperglycemia Management: blood glucose monitored  Hypoglycemia Management: blood glucose monitored  Goal: Absence of Infection Signs and Symptoms  Outcome: Progressing  Intervention: Initiate Sepsis Management  Recent Flowsheet Documentation  Taken 12/17/2024 2000 by Mila Stevens RN  Infection Prevention:   environmental surveillance performed   rest/sleep promoted   equipment surfaces disinfected   hand hygiene promoted   single patient room provided   visitors restricted/screened  Intervention: Promote Recovery  Recent Flowsheet Documentation  Taken 12/17/2024 2000 by Mila Stevens RN  Activity Management: up in chair  Sleep/Rest Enhancement: consistent schedule promoted  Goal: Optimal Nutrition Delivery  Outcome: Progressing   Goal Outcome Evaluation:           Progress: no change  Outcome Evaluation: Patient alert and oriented x 4.  Respirations even and unlabored on room air.  Patient receiving pain, and nausea medication PRN per MD orders. Call light is within reach. Care is ongoing.

## 2024-12-18 NOTE — CASE MANAGEMENT/SOCIAL WORK
Discharge Planning Assessment  [facility]     Patient Name: Herminio Matson  MRN: [id number]  Today's Date: 12/18/2024    Admit Date: 12/16/2024    Plan: CM spoke with Pt at bedside with wife Mary Jane. Pt and wife stated they needed help with hospital bill. CM contacted [facility] in Financial assistance in which she stated she would take Pt a financial aid packet.   Discharge Needs Assessment    No documentation.                  Discharge Plan       Row Name 12/18/24 4248       Plan    Plan CM spoke with Pt at bedside with wife Mary Jane. Pt and wife stated they needed help with hospital bill. CM contacted [facility] in Financial assistance in which she stated she would take Pt a financial aid packet.      Row Name 12/18/24 4075       Plan                             Michelle Mahoney RN

## 2024-12-18 NOTE — PROGRESS NOTES
[facility] HOSPITALIST PROGRESS NOTE     Patient Identification:  Name:  Herminio Matson  Age:  53 y.o.  Sex:  male  :  1970  MRN:  [id number]  Visit Number:  [id number]  ROOM: [address]     Primary Care Provider:  Luma Pittman APRJESÚS    Length of stay in inpatient status:  1    Subjective     Chief Compliant:    Chief Complaint   Patient presents with    Abdominal Pain       History of Presenting Illness:    Patient seen and examined this morning with his wife and his a.m. nurse present at bedside.  He was sitting up in a bedside chair and reported feeling a little better than yesterday, but was still having 6 out of 10 upper abdominal pain.  Denied any shortness of breath.  He reports having 2 episodes of Bell's palsy in the past, and says he is currently recovering from the most recent which caused weakness on the right side of his face.    Objective     Current Hospital Meds:[Held by provider] aspirin, 81 mg, Oral, Daily  [Held by provider] empagliflozin, 10 mg, Oral, Daily  mupirocin, 1 Application, Each Nare, BID  sodium chloride, 10 mL, Intravenous, Q12H  sodium chloride, 1 g, Oral, TID With Meals    dextrose, 1-2 mL/kg/hr, Last Rate: 0.778 mL/kg/hr (24 1726)  insulin, 0.05 Units/kg/hr, Last Rate: 0.05 Units/kg/hr (24 1332)        Current Antimicrobial Therapy:  Anti-Infectives (From admission, onward)      Ordered     Dose/Rate Route Frequency Start Stop    24  piperacillin-tazobactam (ZOSYN) IVPB 3.375 g IVPB in 100 mL NS (VTB)        Ordering Provider: Edgard Gong DO    3.375 g  over 30 Minutes Intravenous Once 24          Current Diuretic Therapy:  Diuretics (From admission, onward)      None          ----------------------------------------------------------------------------------------------------------------------  Vital Signs:  Temp:  [97.9 °F (36.6 °C)-99.9 °F (37.7 °C)] 99.9 °F (37.7 °C)  Heart Rate:  [105-125]  108  Resp:  [12-20] 16  BP: ()/() 132/80  SpO2:  [92 %-98 %] 93 %  on   ;   Device (Oxygen Therapy): room air  Body mass index is 28.31 kg/m².    Wt Readings from Last 3 Encounters:   12/18/24 94.7 kg (208 lb 12.4 oz)   09/08/23 95.6 kg (210 lb 12.2 oz)     Intake & Output (last 3 days)         12/15 0701 12/16 0700 12/16 0701 12/17 0700 12/17 0701 12/18 0700 12/18 0701 12/19 0700    P.O.    250    I.V. (mL/kg)  593.2 (6.6) 2309.9 (24.4)     Total Intake(mL/kg)  593.2 (6.6) 2309.9 (24.4) 250 (2.6)    Urine (mL/kg/hr)  0 600 (0.3) 400 (0.4)    Emesis/NG output  0 0     Stool  0 0     Total Output  0 600 400    Net  +593.2 +1709.9 -150            Urine Unmeasured Occurrence  0 x 3 x     Stool Unmeasured Occurrence  0 x 0 x     Emesis Unmeasured Occurrence  0 x 0 x           NPO Diet NPO Type: Sips with Meds, Ice Chips  ----------------------------------------------------------------------------------------------------------------------  Physical exam:   Constitutional:  Well-developed and well-nourished.  No acute distress.      HENT:  Head:  Normocephalic and atraumatic.   Cardiovascular: Mild tachycardia, regular rhythm, no murmur  Pulmonary/Chest:  No respiratory distress, breath sounds clear to auscultation bilaterally  Abdominal:  Soft.  Slight distention.  Mild to moderate right upper quadrant and epigastric tenderness to palpation.  No rigidity or guarding.  Musculoskeletal:  No deformity.  No red or swollen joints anywhere.    Neurological: Awake, alert.  Decreased blinking of right eye compared to left and muscle weakness noted of the lower right side of the face.  Skin:  Skin is warm and dry. No rash noted. No pallor.   Peripheral vascular:  No cyanosis, no edema.  Psychiatric: Appropriate mood and affect    ----------------------------------------------------------------------------------------------------------------------  Results from last 7 days   Lab Units 12/17/24  0541 12/16/24  1935  "12/16/24  1658   LACTATE mmol/L 2.4*  --   --    WBC 10*3/mm3 10.60  --  16.98*   HEMOGLOBIN g/dL 18.7*  --  18.0*   HEMATOCRIT % 51.7*  --  49.8   MCV fL 89.9  --  91.0   MCHC g/dL 36.2*  --  36.4*   PLATELETS 10*3/mm3 312  --  267   INR   --  0.97  --          Results from last 7 days   Lab Units 12/18/24  1010 12/17/24  0953 12/17/24  0541 12/16/24  2303   SODIUM mmol/L 125* 134* 131*  --    POTASSIUM mmol/L 4.1 4.7 4.7  --    MAGNESIUM mg/dL 1.8  --  2.1  --    CHLORIDE mmol/L 95* 107 100  --    CO2 mmol/L 18.3* 18.0* 20.4*  --    BUN mg/dL 31* 28* 25*  --    CREATININE mg/dL 1.02 1.78* 1.49*  --    CALCIUM mg/dL 7.4* 6.9* 7.2*  --    PHOSPHORUS mg/dL 2.2*  --  2.4* 3.1   GLUCOSE mg/dL 196* 204* 268*  --    ALBUMIN g/dL  --   --  3.2*  --    BILIRUBIN mg/dL  --   --  0.5  --    ALK PHOS U/L  --   --  48  --    AST (SGOT) U/L  --   --  82*  --    ALT (SGPT) U/L  --   --  31  --    Estimated Creatinine Clearance: 97.7 mL/min (by C-G formula based on SCr of 1.02 mg/dL).  No results found for: \"AMMONIA\"          Results from last 7 days   Lab Units 12/18/24  1010 12/16/24  2215 12/16/24  1658   CHOLESTEROL mg/dL  --   --  1,185*   TRIGLYCERIDES mg/dL 2,396*   < > >4,425*   HDL CHOL mg/dL  --   --  17*    < > = values in this interval not displayed.     Hemoglobin A1C   Date/Time Value Ref Range Status   12/16/2024 2215   Corrected     Comment:     Questionable results due to grossly lipemic specimen.    Dr. Gong in ER aware of lipemia interference. Dr. Gunn (hospitalist) aware of corrected report.  Corrected result. Previous result was 8.80 % on 12/16/2024 at 2231 EST.     Glucose   Date/Time Value Ref Range Status   12/18/2024 1611 162 (H) 70 - 130 mg/dL Final   12/18/2024 1522 157 (H) 70 - 130 mg/dL Final   12/18/2024 1437 185 (H) 70 - 130 mg/dL Final   12/18/2024 1324 173 (H) 70 - 130 mg/dL Final   12/18/2024 1217 172 (H) 70 - 130 mg/dL Final   12/18/2024 1132 191 (H) 70 - 130 mg/dL Final   12/18/2024 1012 " "180 (H) 70 - 130 mg/dL Final   12/18/2024 0936 196 (H) 70 - 130 mg/dL Final     No results found for: \"TSH\", \"FREET4\"  No results found for: \"PREGTESTUR\", \"PREGSERUM\", \"HCG\", \"HCGQUANT\"  Pain Management Panel           No data to display              Brief Urine Lab Results  (Last result in the past 365 days)        Color   Clarity   Blood   Leuk Est   Nitrite   Protein   CREAT   Urine HCG        12/16/24 1655 Yellow   Clear   Negative   Negative   Negative   Trace                 Blood Culture   Date Value Ref Range Status   12/16/2024 No growth at 24 hours  Preliminary   12/16/2024 No growth at 24 hours  Preliminary     Results from last 7 days   Lab Units 12/17/24  0541   LACTATE mmol/L 2.4*       I have personally looked at the labs and they are summarized above.  ----------------------------------------------------------------------------------------------------------------------  Detailed radiology reports for the last 24 hours:  Imaging Results (Last 24 Hours)       ** No results found for the last 24 hours. **          Assessment & Plan      #Acute pancreatitis secondary to severe hypertriglyceridemia  #SIRS criteria due to above  #Pseudohyponatremia, resolved, and now with actual hyponatremi  #Acute kidney injury, resolved  - Pancreatitis is improving. Triglyceride and lipase levels are trending down. Continue insulin drip until triglyceride level is <500 per [facility] recommendations obtained during patient's ER evaluation.   - Continue D10 infusion and titrate to prevent hypoglycemia while receiving the insulin drip.   -Patient's initial sodium level was falsely low due to my pain example.  Labs have since been sent out for confirmatory testing and sodium level had improved, but decreased to 125 today.  Since he received several normal saline fluid boluses and is continuing to receive IV fluids with D10 (rate today has been around 70 mL/h), I would like to hold off on additional IV fluids to prevent causing " volume overload.  Add sodium chloride tablets 1 g 3 times daily while he continues to receive the D10 W infusion.  Monitor sodium level with serial BMP and adjust therapy as needed.  -Creatinine peaked at 1.78 and has now returned to near baseline at 1.02.  - Still having significant abdominal pain and requiring frequent pain medications. Reported not feeling hungry this morning, so continue NPO except ice chips and sips of water    #Type II DM  - insulin drip as above as well as D10 to prevent hypoglycemia. Monitor with q1h accuchecks.    # Bell's palsy  -Supportive care      Dispo:  likely home at discharge pending clinical improvement     Alesha Gunn DO  [facility] Hospitalist  12/18/24  17:26 EST

## 2024-12-18 NOTE — PROGRESS NOTES
[facility] HOSPITALIST PROGRESS NOTE     Patient Identification:  Name:  Herminio Matson  Age:  53 y.o.  Sex:  male  :  1970  MRN:  [id number]  Visit Number:  [id number]  ROOM: [address]     Primary Care Provider:  Luma Pittman    Length of stay in inpatient status:  0    Subjective     Chief Compliant:    Chief Complaint   Patient presents with    Abdominal Pain       History of Presenting Illness:    Patient seen and examined this morning. He reported improvement in abdominal pain since admission and denied any shortness of breath, chest pain, nausea, or vomiting today.     Objective     Current Hospital Meds:[Held by provider] aspirin, 81 mg, Oral, Daily  [Held by provider] empagliflozin, 10 mg, Oral, Daily  mupirocin, 1 Application, Each Nare, BID  sodium chloride, 10 mL, Intravenous, Q12H    dextrose, 1-2 mL/kg/hr, Last Rate: Stopped (24 1105)  dextrose, 1-2 mL/kg/hr  insulin, 0.05 Units/kg/hr, Last Rate: 0.05 Units/kg/hr (24 1626)  sodium chloride, 100 mL/hr        Current Antimicrobial Therapy:  Anti-Infectives (From admission, onward)      Ordered     Dose/Rate Route Frequency Start Stop    24  piperacillin-tazobactam (ZOSYN) IVPB 3.375 g IVPB in 100 mL NS (VTB)        Ordering Provider: Edgard Gong DO    3.375 g  over 30 Minutes Intravenous Once 24          Current Diuretic Therapy:  Diuretics (From admission, onward)      None          ----------------------------------------------------------------------------------------------------------------------  Vital Signs:  Temp:  [97.7 °F (36.5 °C)-98.8 °F (37.1 °C)] 97.9 °F (36.6 °C)  Heart Rate:  [111-126] 125  Resp:  [15-20] 18  BP: ()/() 117/99  SpO2:  [90 %-98 %] 98 %  on   ;   Device (Oxygen Therapy): room air  Body mass index is 26.88 kg/m².    Wt Readings from Last 3 Encounters:   24 89.9 kg (198 lb 3.1 oz)   23 95.6 kg (210 lb 12.2 oz)      Intake & Output (last 3 days)         12/15 0701  12/16 0700 12/16 0701  12/17 0700 12/17 0701  12/18 0700    I.V. (mL/kg)  593.2 (6.6) 951.7 (10.6)    Total Intake(mL/kg)  593.2 (6.6) 951.7 (10.6)    Urine (mL/kg/hr)  0 600 (0.5)    Emesis/NG output  0 0    Stool  0 0    Total Output  0 600    Net  +593.2 +351.7           Urine Unmeasured Occurrence  0 x     Stool Unmeasured Occurrence  0 x 0 x    Emesis Unmeasured Occurrence  0 x 0 x          NPO Diet NPO Type: Sips with Meds, Ice Chips  ----------------------------------------------------------------------------------------------------------------------  Physical exam:   Constitutional:  Well-developed and well-nourished. Mildly ill appearing. No acute distress.      HENT:  Head:  Normocephalic and atraumatic.    Cardiovascular:  Mild tachycardia, regular rhythm   Pulmonary/Chest:  No respiratory distress, no wheezes, no crackles, no rhonchi  Abdominal:  Soft. Mild tenderness to palpation in RUQ without rigidity or guarding.  Musculoskeletal:  No deformity.    Neurological: Awake, alert, no focal deficit on gross examination. No slurred speech or facial droop.   Skin:  Skin is warm and dry.   Peripheral vascular:  No cyanosis, no edema.  Psychiatric: Appropriate mood and affect  Edited by: Alesha Gunn DO at 12/17/2024 0770  ----------------------------------------------------------------------------------------------------------------------  Results from last 7 days   Lab Units 12/17/24  0541 12/16/24  1935 12/16/24  1658   LACTATE mmol/L 2.4*  --   --    WBC 10*3/mm3 10.60  --  16.98*   HEMOGLOBIN g/dL 18.7*  --  18.0*   HEMATOCRIT % 51.7*  --  49.8   MCV fL 89.9  --  91.0   MCHC g/dL 36.2*  --  36.4*   PLATELETS 10*3/mm3 312  --  267   INR   --  0.97  --          Results from last 7 days   Lab Units 12/17/24  0953 12/17/24 0541 12/16/24 2303 12/16/24 2001   SODIUM mmol/L 134* 131*  --   --    POTASSIUM mmol/L 4.7 4.7  --   --    MAGNESIUM mg/dL  --   "2.1  --   --    CHLORIDE mmol/L 107 100  --   --    CO2 mmol/L 18.0* 20.4*  --   --    BUN mg/dL 28* 25*  --   --    CREATININE mg/dL 1.78* 1.49*  --  0.80   CALCIUM mg/dL 6.9* 7.2*  --   --    PHOSPHORUS mg/dL  --  2.4* 3.1  --    GLUCOSE mg/dL 204* 268*  --   --    ALBUMIN g/dL  --  3.2*  --   --    BILIRUBIN mg/dL  --  0.5  --   --    ALK PHOS U/L  --  48  --   --    AST (SGOT) U/L  --  82*  --   --    ALT (SGPT) U/L  --  31  --   --    Estimated Creatinine Clearance: 59.6 mL/min (A) (by C-G formula based on SCr of 1.78 mg/dL (H)).  No results found for: \"AMMONIA\"          Results from last 7 days   Lab Units 12/17/24  0953 12/16/24 2215 12/16/24  1658   CHOLESTEROL mg/dL  --   --  1,185*   TRIGLYCERIDES mg/dL 3,142*   < > >4,425*   HDL CHOL mg/dL  --   --  17*    < > = values in this interval not displayed.     Hemoglobin A1C   Date/Time Value Ref Range Status   12/16/2024 2215   Corrected     Comment:     Questionable results due to grossly lipemic specimen.    Dr. Gong in ER aware of lipemia interference. Dr. Gunn (hospitalist) aware of corrected report.  Corrected result. Previous result was 8.80 % on 12/16/2024 at 2231 EST.     Glucose   Date/Time Value Ref Range Status   12/17/2024 2050 186 (H) 70 - 130 mg/dL Final   12/17/2024 1813 222 (H) 70 - 130 mg/dL Final   12/17/2024 1703 161 (H) 70 - 130 mg/dL Final   12/17/2024 1627 190 (H) 70 - 130 mg/dL Final   12/17/2024 1530 193 (H) 70 - 130 mg/dL Final   12/17/2024 1410 203 (H) 70 - 130 mg/dL Final   12/17/2024 1311 201 (H) 70 - 130 mg/dL Final   12/17/2024 1212 203 (H) 70 - 130 mg/dL Final     No results found for: \"TSH\", \"FREET4\"  No results found for: \"PREGTESTUR\", \"PREGSERUM\", \"HCG\", \"HCGQUANT\"  Pain Management Panel           No data to display              Brief Urine Lab Results  (Last result in the past 365 days)        Color   Clarity   Blood   Leuk Est   Nitrite   Protein   CREAT   Urine HCG        12/16/24 1655 Yellow   Clear   Negative   " "Negative   Negative   Trace                 Blood Culture   Date Value Ref Range Status   12/16/2024 No growth at 24 hours  Preliminary   12/16/2024 No growth at 24 hours  Preliminary     No results found for: \"URINECX\"  No results found for: \"WOUNDCX\"  No results found for: \"STOOLCX\"  No results found for: \"RESPCX\"  No results found for: \"AFBCX\"  Results from last 7 days   Lab Units 12/17/24  0541   LACTATE mmol/L 2.4*       I have personally looked at the labs and they are summarized above.  ----------------------------------------------------------------------------------------------------------------------  Detailed radiology reports for the last 24 hours:  Imaging Results (Last 24 Hours)       ** No results found for the last 24 hours. **          Assessment & Plan      #Acute pancreatitis secondary to severe hypertriglyceridemia  #SIRS criteria due to above  #Pseudohyponatremia  #Acute kidney injury  - No evidence of pancreatic necrosis on CT scan, so no indication for antibiotics currently.   - Case was discussed with  at admission and patient not felt to need plasmapheresis. Recommendation was to treat with insulin drip and monitor serial triglyceride level.   - Continue monitoring glucose q1h  - VS and lab values (triglycerides and lipase) have overall slightly improved. Continue current treatment.   - creatinine increased up to 1.78. continue IV fluids and supportive care, avoid nephrotoxins. Repeat BMP q12h.  - patient had significantly abnormal BMP initially, but lab results were not accurate due to lipemic samples. Sodium level 134 on repeat BMP most recently.  - NPO, okay to have ice chips and sips of water for now    #Type II DM  - insulin drip for hypertriglyceridemia as above  - maintain glucose >200, using D5 infusion if necessary    Edited by: Alesha Gunn DO at 12/17/2024 2130    Active VTE Prophylaxis  Mechanical:        Start        12/17/24 0022  Maintain Sequential Compression Device  " Continuous                            Dispo:  likely home at discharge pending clinical improvement     Alesha Gunn DO  [facility] Hospitalist  12/17/24  21:39 EST

## 2024-12-18 NOTE — PLAN OF CARE
Goal Outcome Evaluation:  Plan of Care Reviewed With: patient        Progress: no change  Outcome Evaluation: Pt VSS, given PRN medications for N/V and pain, interventions effective. Pt on RA and is in a Sinus Rhythm with mIVF's, insulin, and D10 infusing. Pt's triglycerides improved and will trend.

## 2024-12-19 LAB
ANION GAP SERPL CALCULATED.3IONS-SCNC: 11.8 MMOL/L (ref 5–15)
BUN SERPL-MCNC: 21 MG/DL (ref 6–20)
BUN/CREAT SERPL: 28.8 (ref 7–25)
CALCIUM SPEC-SCNC: 7.6 MG/DL (ref 8.6–10.5)
CHLORIDE SERPL-SCNC: 98 MMOL/L (ref 98–107)
CO2 SERPL-SCNC: 16.2 MMOL/L (ref 22–29)
CREAT SERPL-MCNC: 0.73 MG/DL (ref 0.76–1.27)
EGFRCR SERPLBLD CKD-EPI 2021: 107.4 ML/MIN/1.73
GLUCOSE BLDC GLUCOMTR-MCNC: 133 MG/DL (ref 70–130)
GLUCOSE BLDC GLUCOMTR-MCNC: 141 MG/DL (ref 70–130)
GLUCOSE BLDC GLUCOMTR-MCNC: 141 MG/DL (ref 70–130)
GLUCOSE BLDC GLUCOMTR-MCNC: 150 MG/DL (ref 70–130)
GLUCOSE BLDC GLUCOMTR-MCNC: 155 MG/DL (ref 70–130)
GLUCOSE BLDC GLUCOMTR-MCNC: 156 MG/DL (ref 70–130)
GLUCOSE BLDC GLUCOMTR-MCNC: 158 MG/DL (ref 70–130)
GLUCOSE BLDC GLUCOMTR-MCNC: 158 MG/DL (ref 70–130)
GLUCOSE BLDC GLUCOMTR-MCNC: 159 MG/DL (ref 70–130)
GLUCOSE BLDC GLUCOMTR-MCNC: 160 MG/DL (ref 70–130)
GLUCOSE BLDC GLUCOMTR-MCNC: 163 MG/DL (ref 70–130)
GLUCOSE BLDC GLUCOMTR-MCNC: 163 MG/DL (ref 70–130)
GLUCOSE BLDC GLUCOMTR-MCNC: 167 MG/DL (ref 70–130)
GLUCOSE BLDC GLUCOMTR-MCNC: 169 MG/DL (ref 70–130)
GLUCOSE BLDC GLUCOMTR-MCNC: 175 MG/DL (ref 70–130)
GLUCOSE BLDC GLUCOMTR-MCNC: 182 MG/DL (ref 70–130)
GLUCOSE BLDC GLUCOMTR-MCNC: 187 MG/DL (ref 70–130)
GLUCOSE BLDC GLUCOMTR-MCNC: 190 MG/DL (ref 70–130)
GLUCOSE BLDC GLUCOMTR-MCNC: 198 MG/DL (ref 70–130)
GLUCOSE BLDC GLUCOMTR-MCNC: 199 MG/DL (ref 70–130)
GLUCOSE BLDC GLUCOMTR-MCNC: 206 MG/DL (ref 70–130)
GLUCOSE SERPL-MCNC: 204 MG/DL (ref 65–99)
POTASSIUM SERPL-SCNC: 4.4 MMOL/L (ref 3.5–5.2)
SODIUM SERPL-SCNC: 126 MMOL/L (ref 136–145)
TRIGL SERPL-MCNC: 1775 MG/DL (ref 0–150)

## 2024-12-19 PROCEDURE — 82948 REAGENT STRIP/BLOOD GLUCOSE: CPT

## 2024-12-19 PROCEDURE — 36415 COLL VENOUS BLD VENIPUNCTURE: CPT | Performed by: HOSPITALIST

## 2024-12-19 PROCEDURE — 25010000002 ONDANSETRON PER 1 MG: Performed by: HOSPITALIST

## 2024-12-19 PROCEDURE — 84478 ASSAY OF TRIGLYCERIDES: CPT | Performed by: HOSPITALIST

## 2024-12-19 PROCEDURE — 93010 ELECTROCARDIOGRAM REPORT: CPT | Performed by: INTERNAL MEDICINE

## 2024-12-19 PROCEDURE — 25010000002 PROCHLORPERAZINE 10 MG/2ML SOLUTION: Performed by: HOSPITALIST

## 2024-12-19 PROCEDURE — 80048 BASIC METABOLIC PNL TOTAL CA: CPT | Performed by: HOSPITALIST

## 2024-12-19 PROCEDURE — 99232 SBSQ HOSP IP/OBS MODERATE 35: CPT | Performed by: STUDENT IN AN ORGANIZED HEALTH CARE EDUCATION/TRAINING PROGRAM

## 2024-12-19 PROCEDURE — 25010000002 HYDROMORPHONE 1 MG/ML SOLUTION: Performed by: HOSPITALIST

## 2024-12-19 PROCEDURE — 93005 ELECTROCARDIOGRAM TRACING: CPT | Performed by: STUDENT IN AN ORGANIZED HEALTH CARE EDUCATION/TRAINING PROGRAM

## 2024-12-19 RX ORDER — DEXTROSE MONOHYDRATE 100 MG/ML
1-2 INJECTION, SOLUTION INTRAVENOUS CONTINUOUS
Status: DISPENSED | OUTPATIENT
Start: 2024-12-19 | End: 2024-12-20

## 2024-12-19 RX ADMIN — HYDROMORPHONE HYDROCHLORIDE 1 MG: 1 INJECTION, SOLUTION INTRAMUSCULAR; INTRAVENOUS; SUBCUTANEOUS at 00:10

## 2024-12-19 RX ADMIN — HYDROMORPHONE HYDROCHLORIDE 1 MG: 1 INJECTION, SOLUTION INTRAMUSCULAR; INTRAVENOUS; SUBCUTANEOUS at 03:25

## 2024-12-19 RX ADMIN — HYDROMORPHONE HYDROCHLORIDE 1 MG: 1 INJECTION, SOLUTION INTRAMUSCULAR; INTRAVENOUS; SUBCUTANEOUS at 07:28

## 2024-12-19 RX ADMIN — HYDROMORPHONE HYDROCHLORIDE 1 MG: 1 INJECTION, SOLUTION INTRAMUSCULAR; INTRAVENOUS; SUBCUTANEOUS at 18:11

## 2024-12-19 RX ADMIN — DEXTROSE MONOHYDRATE 0.78 ML/KG/HR: 25 INJECTION, SOLUTION INTRAVENOUS at 03:53

## 2024-12-19 RX ADMIN — MUPIROCIN 1 APPLICATION: 20 OINTMENT TOPICAL at 08:10

## 2024-12-19 RX ADMIN — INSULIN HUMAN 0.05 UNITS/KG/HR: 1 INJECTION, SOLUTION INTRAVENOUS at 11:24

## 2024-12-19 RX ADMIN — Medication 10 ML: at 08:10

## 2024-12-19 RX ADMIN — ONDANSETRON 4 MG: 2 INJECTION INTRAMUSCULAR; INTRAVENOUS at 10:07

## 2024-12-19 RX ADMIN — DEXTROSE 0.67 ML/KG/HR: 10 SOLUTION INTRAVENOUS at 17:43

## 2024-12-19 RX ADMIN — DEXTROSE 1 ML/KG/HR: 10 SOLUTION INTRAVENOUS at 22:55

## 2024-12-19 RX ADMIN — HYDROMORPHONE HYDROCHLORIDE 1 MG: 1 INJECTION, SOLUTION INTRAMUSCULAR; INTRAVENOUS; SUBCUTANEOUS at 10:07

## 2024-12-19 RX ADMIN — HYDROMORPHONE HYDROCHLORIDE 1 MG: 1 INJECTION, SOLUTION INTRAMUSCULAR; INTRAVENOUS; SUBCUTANEOUS at 21:30

## 2024-12-19 RX ADMIN — Medication 1 G: at 17:43

## 2024-12-19 RX ADMIN — Medication 1 G: at 11:24

## 2024-12-19 RX ADMIN — HYDROMORPHONE HYDROCHLORIDE 1 MG: 1 INJECTION, SOLUTION INTRAMUSCULAR; INTRAVENOUS; SUBCUTANEOUS at 14:55

## 2024-12-19 RX ADMIN — PROCHLORPERAZINE EDISYLATE 2.5 MG: 5 INJECTION INTRAMUSCULAR; INTRAVENOUS at 12:21

## 2024-12-19 RX ADMIN — Medication 1 G: at 08:10

## 2024-12-19 RX ADMIN — Medication 10 ML: at 21:30

## 2024-12-19 RX ADMIN — MUPIROCIN 1 APPLICATION: 20 OINTMENT TOPICAL at 21:29

## 2024-12-19 NOTE — PROGRESS NOTES
[facility] HOSPITALIST PROGRESS NOTE     Patient Identification:  Name:  Herminio Matson  Age:  53 y.o.  Sex:  male  :  1970  MRN:  [id number]  Visit Number:  [id number]  ROOM: [address]     Primary Care Provider:  Luma Pittman    Length of stay in inpatient status:  2    Subjective     Chief Compliant:    Chief Complaint   Patient presents with    Abdominal Pain       History of Presenting Illness:    Patient seen and examined this morning with his wife present at bedside. He reported having more pain and nausea after trying to eat some chicken broth this morning. At time of my exam he rated his pain 6/10. Denied any other new complaints or needs.    Objective     Current Hospital Meds:[Held by provider] aspirin, 81 mg, Oral, Daily  [Held by provider] empagliflozin, 10 mg, Oral, Daily  mupirocin, 1 Application, Each Nare, BID  sodium chloride, 10 mL, Intravenous, Q12H  sodium chloride, 1 g, Oral, TID With Meals    dextrose, 1-2 mL/kg/hr, Last Rate: 0.666 mL/kg/hr (24 1159)  insulin, 0.05 Units/kg/hr, Last Rate: 0.05 Units/kg/hr (24 1124)        Current Antimicrobial Therapy:  Anti-Infectives (From admission, onward)      Ordered     Dose/Rate Route Frequency Start Stop    24  piperacillin-tazobactam (ZOSYN) IVPB 3.375 g IVPB in 100 mL NS (VTB)        Ordering Provider: Edgard Gong DO    3.375 g  over 30 Minutes Intravenous Once 24          Current Diuretic Therapy:  Diuretics (From admission, onward)      None          ----------------------------------------------------------------------------------------------------------------------  Vital Signs:  Temp:  [97.9 °F (36.6 °C)-99.9 °F (37.7 °C)] 98.5 °F (36.9 °C)  Heart Rate:  [101-113] 107  Resp:  [14-26] 26  BP: (108-159)/(71-95) 159/86  SpO2:  [88 %-98 %] 96 %  on   ;   Device (Oxygen Therapy): room air  Body mass index is 28.31 kg/m².    Wt Readings from Last 3 Encounters:    12/18/24 94.7 kg (208 lb 12.4 oz)   09/08/23 95.6 kg (210 lb 12.2 oz)     Intake & Output (last 3 days)         12/16 0701 12/17 0700 12/17 0701 12/18 0700 12/18 0701 12/19 0700 12/19 0701 12/20 0700    P.O.   610     I.V. (mL/kg) 593.2 (6.6) 2309.9 (24.4) 1251.6 (13.2)     Total Intake(mL/kg) 593.2 (6.6) 2309.9 (24.4) 1861.6 (19.7)     Urine (mL/kg/hr) 0 600 (0.3) 1850 (0.8) 300 (0.4)    Emesis/NG output 0 0      Stool 0 0 0     Total Output 0 600 1850 300    Net +593.2 +1709.9 +11.6 -300            Urine Unmeasured Occurrence 0 x 3 x      Stool Unmeasured Occurrence 0 x 0 x 0 x     Emesis Unmeasured Occurrence 0 x 0 x            Diet: Liquid, Gastrointestinal, Diabetic; Clear Liquid; Consistent Carbohydrate; Fat-Restricted; Fluid Consistency: Thin (IDDSI 0)  ----------------------------------------------------------------------------------------------------------------------  Physical exam:   Constitutional:  Well-developed and well-nourished. No acute distress.      HENT:  Head:  Normocephalic and atraumatic.    Cardiovascular:  Mild tachycardia, regular rhythm   Pulmonary/Chest:  No respiratory distress, no wheezes, no crackles, no rhonchi  Abdominal:  Soft. Mild distention, palpation still present in RUQ and epigastric region without rigidity  Musculoskeletal:  No deformity.    Neurological: Awake, alert, mild right sided facial weakness consistent with reported Bell's palsy.  Skin:  Skin is warm and dry.   Peripheral vascular:  No cyanosis, trace lower extremity edema bilaterally  Psychiatric: Appropriate mood and affect  Edited by: Alesha Gunn DO at 12/19/2024 1132  ----------------------------------------------------------------------------------------------------------------------  Results from last 7 days   Lab Units 12/17/24  0541 12/16/24  1935 12/16/24  1658   LACTATE mmol/L 2.4*  --   --    WBC 10*3/mm3 10.60  --  16.98*   HEMOGLOBIN g/dL 18.7*  --  18.0*   HEMATOCRIT % 51.7*  --  49.8   MCV  "fL 89.9  --  91.0   MCHC g/dL 36.2*  --  36.4*   PLATELETS 10*3/mm3 312  --  267   INR   --  0.97  --          Results from last 7 days   Lab Units 12/19/24  0105 12/18/24 2120 12/18/24  1010 12/17/24  0953 12/17/24  0541 12/16/24  2303   SODIUM mmol/L 126* 128* 125*   < > 131*  --    POTASSIUM mmol/L 4.4 4.3 4.1   < > 4.7  --    MAGNESIUM mg/dL  --   --  1.8  --  2.1  --    CHLORIDE mmol/L 98 100 95*   < > 100  --    CO2 mmol/L 16.2* 17.8* 18.3*   < > 20.4*  --    BUN mg/dL 21* 22* 31*   < > 25*  --    CREATININE mg/dL 0.73* 0.78 1.02   < > 1.49*  --    CALCIUM mg/dL 7.6* 7.7* 7.4*   < > 7.2*  --    PHOSPHORUS mg/dL  --   --  2.2*  --  2.4* 3.1   GLUCOSE mg/dL 204* 196* 196*   < > 268*  --    ALBUMIN g/dL  --   --   --   --  3.2*  --    BILIRUBIN mg/dL  --   --   --   --  0.5  --    ALK PHOS U/L  --   --   --   --  48  --    AST (SGOT) U/L  --   --   --   --  82*  --    ALT (SGPT) U/L  --   --   --   --  31  --     < > = values in this interval not displayed.   Estimated Creatinine Clearance: 136.5 mL/min (A) (by C-G formula based on SCr of 0.73 mg/dL (L)).  No results found for: \"AMMONIA\"          Results from last 7 days   Lab Units 12/19/24 0105 12/16/24  2215 12/16/24  1658   CHOLESTEROL mg/dL  --   --  1,185*   TRIGLYCERIDES mg/dL 1,775*   < > >4,425*   HDL CHOL mg/dL  --   --  17*    < > = values in this interval not displayed.     Hemoglobin A1C   Date/Time Value Ref Range Status   12/16/2024 2215   Corrected     Comment:     Questionable results due to grossly lipemic specimen.    Dr. Gong in ER aware of lipemia interference. Dr. Gunn (hospitalist) aware of corrected report.  Corrected result. Previous result was 8.80 % on 12/16/2024 at 2231 EST.     Glucose   Date/Time Value Ref Range Status   12/19/2024 1458 163 (H) 70 - 130 mg/dL Final   12/19/2024 1355 158 (H) 70 - 130 mg/dL Final   12/19/2024 1305 169 (H) 70 - 130 mg/dL Final   12/19/2024 1203 175 (H) 70 - 130 mg/dL Final   12/19/2024 1109 206 " "(H) 70 - 130 mg/dL Final   12/19/2024 1007 182 (H) 70 - 130 mg/dL Final   12/19/2024 0904 187 (H) 70 - 130 mg/dL Final   12/19/2024 0809 156 (H) 70 - 130 mg/dL Final     No results found for: \"TSH\", \"FREET4\"  No results found for: \"PREGTESTUR\", \"PREGSERUM\", \"HCG\", \"HCGQUANT\"  Pain Management Panel           No data to display              Brief Urine Lab Results  (Last result in the past 365 days)        Color   Clarity   Blood   Leuk Est   Nitrite   Protein   CREAT   Urine HCG        12/16/24 1655 Yellow   Clear   Negative   Negative   Negative   Trace                 Blood Culture   Date Value Ref Range Status   12/16/2024 No growth at 2 days  Preliminary   12/16/2024 No growth at 2 days  Preliminary       Results from last 7 days   Lab Units 12/17/24  0541   LACTATE mmol/L 2.4*       I have personally looked at the labs and they are summarized above.  ----------------------------------------------------------------------------------------------------------------------  Detailed radiology reports for the last 24 hours:  Imaging Results (Last 24 Hours)       ** No results found for the last 24 hours. **          Assessment & Plan      #Acute pancreatitis secondary to severe hypertriglyceridemia  #SIRS criteria due to above  #Pseudohyponatremia, now resolved and with true hyponatremia  #Acute kidney injury  - No evidence of pancreatic necrosis on CT scan, so no indication for antibiotics currently.   - Case was discussed with [facility] at admission and patient not felt to need plasmapheresis. Recommendation was to treat with insulin drip and monitor serial triglyceride level. Triglyceride level has trended down from too high to read (>4,425) to 1775.  - VS have remained stable and improving.   - Continue insulin drip until triglyceride level is <500. Continue monitoring glucose q1h  - creatinine has now returned to normal after IV fluids.   - Sodium level decreased down to 125 after receiving D10W infusion needed to " prevent hypoglycemia while on the insulin drip. Due to the need to continue the dextrose infusion, and risk of worsening volume overload with additional IV fluids, sodium chloride tablets were started yesterday and sodium level has remained relatively stable (125-->128-->126). Continue BMP q12h.  - Advanced to a fat restricted, clear liquid diet. Patient had increase in pain and nausea with small amount of oral intake this morning so he was counseled to decrease PO intake again and advance slowly as tolerated.    #Type II DM  - insulin drip for hypertriglyceridemia as above  - maintain glucose 150-200, using D5 infusion if necessary    # Bell's palsy  -Supportive care  Edited by: Alesha Gunn DO at 12/19/2024 1132    Active VTE Prophylaxis  Mechanical:        Start        12/17/24 0022  Maintain Sequential Compression Device  Continuous                            Dispo:  likely home at discharge pending clinical improvement      Alesha Gunn DO  [facility]  12/19/24  15:39 EST

## 2024-12-19 NOTE — PLAN OF CARE
Problem: Adult Inpatient Plan of Care  Goal: Plan of Care Review  Outcome: Progressing  Goal: Patient-Specific Goal (Individualized)  Outcome: Progressing  Goal: Absence of Hospital-Acquired Illness or Injury  Outcome: Progressing  Intervention: Identify and Manage Fall Risk  Recent Flowsheet Documentation  Taken 12/19/2024 0600 by Radha Burrows, RN  Safety Promotion/Fall Prevention:   safety round/check completed   fall prevention program maintained  Taken 12/19/2024 0500 by Radha Burrows RN  Safety Promotion/Fall Prevention:   safety round/check completed   fall prevention program maintained  Taken 12/19/2024 0400 by Radha Burrows RN  Safety Promotion/Fall Prevention:   safety round/check completed   fall prevention program maintained  Taken 12/19/2024 0300 by Radha Burrows RN  Safety Promotion/Fall Prevention:   safety round/check completed   fall prevention program maintained  Taken 12/19/2024 0200 by Radha Burrows RN  Safety Promotion/Fall Prevention:   safety round/check completed   fall prevention program maintained  Taken 12/19/2024 0100 by Radha Burrows RN  Safety Promotion/Fall Prevention:   safety round/check completed   fall prevention program maintained  Taken 12/19/2024 0000 by Radha Burrows RN  Safety Promotion/Fall Prevention:   safety round/check completed   fall prevention program maintained  Taken 12/18/2024 2300 by Radha Burrows RN  Safety Promotion/Fall Prevention:   safety round/check completed   fall prevention program maintained  Taken 12/18/2024 2200 by Radha Burrows RN  Safety Promotion/Fall Prevention:   safety round/check completed   fall prevention program maintained  Taken 12/18/2024 2100 by Radha Burrows RN  Safety Promotion/Fall Prevention:   safety round/check completed   fall prevention program maintained  Taken 12/18/2024 2030 by Radha Burrows, RN  Safety Promotion/Fall Prevention:   safety round/check completed   fall prevention program maintained  Taken  12/18/2024 2000 by Radha Burrows RN  Safety Promotion/Fall Prevention:   safety round/check completed   fall prevention program maintained  Taken 12/18/2024 1900 by Radha Burrows RN  Safety Promotion/Fall Prevention:   safety round/check completed   fall prevention program maintained  Intervention: Prevent Skin Injury  Recent Flowsheet Documentation  Taken 12/19/2024 0600 by Radha Burrows RN  Body Position: position changed independently  Taken 12/19/2024 0400 by Radha Burrows RN  Body Position: position changed independently  Taken 12/19/2024 0200 by Radha Burrows RN  Body Position: position changed independently  Taken 12/19/2024 0000 by Radha Burrows RN  Body Position: position changed independently  Taken 12/18/2024 2200 by Radha Burrows RN  Body Position: sitting up in bed  Taken 12/18/2024 2030 by Radha Burrows RN  Body Position: position changed independently  Skin Protection:   incontinence pads utilized   pulse oximeter probe site changed  Intervention: Prevent and Manage VTE (Venous Thromboembolism) Risk  Recent Flowsheet Documentation  Taken 12/18/2024 2030 by Radha Burrows RN  VTE Prevention/Management: (patient ambulated in room and declines application of SCDS)   bilateral   SCDs (sequential compression devices) off  Intervention: Prevent Infection  Recent Flowsheet Documentation  Taken 12/19/2024 0600 by Radha Burrows RN  Infection Prevention:   hand hygiene promoted   rest/sleep promoted  Taken 12/19/2024 0500 by Radha Burrows RN  Infection Prevention:   hand hygiene promoted   rest/sleep promoted  Taken 12/19/2024 0400 by Radha Burrows RN  Infection Prevention:   hand hygiene promoted   rest/sleep promoted  Taken 12/19/2024 0300 by Radha Burrows RN  Infection Prevention:   hand hygiene promoted   rest/sleep promoted  Taken 12/19/2024 0200 by Radha Burrows RN  Infection Prevention:   hand hygiene promoted   rest/sleep promoted  Taken 12/19/2024 0100 by Radha Burrows  RN  Infection Prevention:   hand hygiene promoted   rest/sleep promoted  Taken 12/19/2024 0000 by Radha Burrows RN  Infection Prevention:   hand hygiene promoted   rest/sleep promoted  Taken 12/18/2024 2300 by Radha Burrows RN  Infection Prevention:   hand hygiene promoted   rest/sleep promoted  Taken 12/18/2024 2200 by Radha Burrows RN  Infection Prevention:   hand hygiene promoted   rest/sleep promoted  Taken 12/18/2024 2100 by Radha Burrows RN  Infection Prevention:   hand hygiene promoted   rest/sleep promoted  Taken 12/18/2024 2030 by Radha Burrows RN  Infection Prevention:   hand hygiene promoted   rest/sleep promoted  Taken 12/18/2024 2000 by Radha Burrows RN  Infection Prevention:   hand hygiene promoted   rest/sleep promoted  Taken 12/18/2024 1900 by Radha Burrows RN  Infection Prevention:   hand hygiene promoted   rest/sleep promoted  Goal: Optimal Comfort and Wellbeing  Outcome: Progressing  Intervention: Provide Person-Centered Care  Recent Flowsheet Documentation  Taken 12/19/2024 0200 by Radha Burrows RN  Trust Relationship/Rapport:   care explained   questions answered   questions encouraged   reassurance provided   thoughts/feelings acknowledged  Taken 12/18/2024 2030 by Radha Burrows RN  Trust Relationship/Rapport:   care explained   questions encouraged   questions answered   reassurance provided   thoughts/feelings acknowledged  Goal: Readiness for Transition of Care  Outcome: Progressing     Problem: Skin Injury Risk Increased  Goal: Skin Health and Integrity  Outcome: Progressing  Intervention: Optimize Skin Protection  Recent Flowsheet Documentation  Taken 12/19/2024 0600 by Radha Burrows, RN  Activity Management: up in chair  Taken 12/19/2024 0400 by Radha Burrows RN  Activity Management: up in chair  Taken 12/19/2024 0200 by Radha Burrows, RN  Activity Management: up in chair  Taken 12/19/2024 0000 by Radha Burrows, RN  Head of Bed (HOB) Positioning: (up in chair)  --  Taken 12/18/2024 2200 by Radha Burrows RN  Head of Bed (HOB) Positioning: HOB elevated  Taken 12/18/2024 2030 by Radha Burrows RN  Activity Management: activity encouraged  Pressure Reduction Techniques:   frequent weight shift encouraged   heels elevated off bed  Head of Bed (HOB) Positioning: HOB at 30-45 degrees  Pressure Reduction Devices:   heel offloading device utilized   positioning supports utilized   pressure-redistributing mattress utilized  Skin Protection:   incontinence pads utilized   pulse oximeter probe site changed     Problem: Sepsis/Septic Shock  Goal: Optimal Coping  Outcome: Progressing  Intervention: Support Patient and Family Response  Recent Flowsheet Documentation  Taken 12/19/2024 0200 by Radha Burrows RN  Family/Support System Care: self-care encouraged  Taken 12/18/2024 2030 by Radha Burrows RN  Family/Support System Care:   presence promoted   self-care encouraged  Goal: Absence of Bleeding  Outcome: Progressing  Goal: Blood Glucose Level Within Target Range  Outcome: Progressing  Goal: Absence of Infection Signs and Symptoms  Outcome: Progressing  Intervention: Initiate Sepsis Management  Recent Flowsheet Documentation  Taken 12/19/2024 0600 by Radha Burrows RN  Infection Prevention:   hand hygiene promoted   rest/sleep promoted  Taken 12/19/2024 0500 by Radha Burrows RN  Infection Prevention:   hand hygiene promoted   rest/sleep promoted  Taken 12/19/2024 0400 by Radha Burrows RN  Infection Prevention:   hand hygiene promoted   rest/sleep promoted  Taken 12/19/2024 0300 by Radha Burrows RN  Infection Prevention:   hand hygiene promoted   rest/sleep promoted  Taken 12/19/2024 0200 by Radha Burrows RN  Infection Prevention:   hand hygiene promoted   rest/sleep promoted  Taken 12/19/2024 0100 by Radha Burrows RN  Infection Prevention:   hand hygiene promoted   rest/sleep promoted  Taken 12/19/2024 0000 by Radha Burrows RN  Infection Prevention:   hand hygiene  promoted   rest/sleep promoted  Taken 12/18/2024 2300 by Radha Burrows, RN  Infection Prevention:   hand hygiene promoted   rest/sleep promoted  Taken 12/18/2024 2200 by Radha Burrows RN  Infection Prevention:   hand hygiene promoted   rest/sleep promoted  Taken 12/18/2024 2100 by Radha Burrows RN  Infection Prevention:   hand hygiene promoted   rest/sleep promoted  Taken 12/18/2024 2030 by Radha Burrows RN  Infection Prevention:   hand hygiene promoted   rest/sleep promoted  Taken 12/18/2024 2000 by Radha Burrows RN  Infection Prevention:   hand hygiene promoted   rest/sleep promoted  Taken 12/18/2024 1900 by Radha Burrows RN  Infection Prevention:   hand hygiene promoted   rest/sleep promoted  Intervention: Promote Recovery  Recent Flowsheet Documentation  Taken 12/19/2024 0600 by Radha Burrows, RN  Activity Management: up in chair  Taken 12/19/2024 0400 by Radha Burrows, RN  Activity Management: up in chair  Taken 12/19/2024 0200 by Radha Burrows, RN  Activity Management: up in chair  Taken 12/18/2024 2030 by Radha Burrows, RN  Activity Management: activity encouraged  Goal: Optimal Nutrition Delivery  Outcome: Progressing

## 2024-12-20 ENCOUNTER — APPOINTMENT (OUTPATIENT)
Dept: CT IMAGING | Facility: HOSPITAL | Age: 54
End: 2024-12-20
Payer: COMMERCIAL

## 2024-12-20 LAB
ALBUMIN SERPL-MCNC: 2.4 G/DL (ref 3.5–5.2)
ALP SERPL-CCNC: 64 U/L (ref 39–117)
ALT SERPL W P-5'-P-CCNC: 17 U/L (ref 1–41)
ANION GAP SERPL CALCULATED.3IONS-SCNC: 10.3 MMOL/L (ref 5–15)
AST SERPL-CCNC: 18 U/L (ref 1–40)
BILIRUB CONJ SERPL-MCNC: 0.2 MG/DL (ref 0–0.3)
BILIRUB INDIRECT SERPL-MCNC: 0.3 MG/DL
BILIRUB SERPL-MCNC: 0.5 MG/DL (ref 0–1.2)
BUN SERPL-MCNC: 12 MG/DL (ref 6–20)
BUN/CREAT SERPL: 17.9 (ref 7–25)
CALCIUM SPEC-SCNC: 8.2 MG/DL (ref 8.6–10.5)
CHLORIDE SERPL-SCNC: 96 MMOL/L (ref 98–107)
CO2 SERPL-SCNC: 22.7 MMOL/L (ref 22–29)
CREAT SERPL-MCNC: 0.67 MG/DL (ref 0.76–1.27)
CRP SERPL-MCNC: 27.5 MG/DL (ref 0–0.5)
DEPRECATED RDW RBC AUTO: 47.7 FL (ref 37–54)
EGFRCR SERPLBLD CKD-EPI 2021: 110.3 ML/MIN/1.73
ERYTHROCYTE [DISTWIDTH] IN BLOOD BY AUTOMATED COUNT: 14.1 % (ref 12.3–15.4)
GLUCOSE BLDC GLUCOMTR-MCNC: 136 MG/DL (ref 70–130)
GLUCOSE BLDC GLUCOMTR-MCNC: 146 MG/DL (ref 70–130)
GLUCOSE BLDC GLUCOMTR-MCNC: 150 MG/DL (ref 70–130)
GLUCOSE BLDC GLUCOMTR-MCNC: 162 MG/DL (ref 70–130)
GLUCOSE BLDC GLUCOMTR-MCNC: 164 MG/DL (ref 70–130)
GLUCOSE BLDC GLUCOMTR-MCNC: 171 MG/DL (ref 70–130)
GLUCOSE BLDC GLUCOMTR-MCNC: 174 MG/DL (ref 70–130)
GLUCOSE BLDC GLUCOMTR-MCNC: 176 MG/DL (ref 70–130)
GLUCOSE BLDC GLUCOMTR-MCNC: 177 MG/DL (ref 70–130)
GLUCOSE BLDC GLUCOMTR-MCNC: 183 MG/DL (ref 70–130)
GLUCOSE BLDC GLUCOMTR-MCNC: 183 MG/DL (ref 70–130)
GLUCOSE BLDC GLUCOMTR-MCNC: 184 MG/DL (ref 70–130)
GLUCOSE BLDC GLUCOMTR-MCNC: 187 MG/DL (ref 70–130)
GLUCOSE BLDC GLUCOMTR-MCNC: 187 MG/DL (ref 70–130)
GLUCOSE BLDC GLUCOMTR-MCNC: 188 MG/DL (ref 70–130)
GLUCOSE BLDC GLUCOMTR-MCNC: 195 MG/DL (ref 70–130)
GLUCOSE BLDC GLUCOMTR-MCNC: 195 MG/DL (ref 70–130)
GLUCOSE BLDC GLUCOMTR-MCNC: 208 MG/DL (ref 70–130)
GLUCOSE BLDC GLUCOMTR-MCNC: 211 MG/DL (ref 70–130)
GLUCOSE BLDC GLUCOMTR-MCNC: 215 MG/DL (ref 70–130)
GLUCOSE BLDC GLUCOMTR-MCNC: 219 MG/DL (ref 70–130)
GLUCOSE BLDC GLUCOMTR-MCNC: 226 MG/DL (ref 70–130)
GLUCOSE SERPL-MCNC: 166 MG/DL (ref 65–99)
HCT VFR BLD AUTO: 39.1 % (ref 37.5–51)
HGB BLD-MCNC: 12.9 G/DL (ref 13–17.7)
LYMPHOCYTES # BLD MANUAL: 0.59 10*3/MM3 (ref 0.7–3.1)
LYMPHOCYTES NFR BLD MANUAL: 3 % (ref 5–12)
MCH RBC QN AUTO: 30.5 PG (ref 26.6–33)
MCHC RBC AUTO-ENTMCNC: 33 G/DL (ref 31.5–35.7)
MCV RBC AUTO: 92.4 FL (ref 79–97)
METAMYELOCYTES NFR BLD MANUAL: 2 % (ref 0–0)
MONOCYTES # BLD: 0.35 10*3/MM3 (ref 0.1–0.9)
NEUTROPHILS # BLD AUTO: 10.58 10*3/MM3 (ref 1.7–7)
NEUTROPHILS NFR BLD MANUAL: 87 % (ref 42.7–76)
NEUTS BAND NFR BLD MANUAL: 3 % (ref 0–5)
PLAT MORPH BLD: NORMAL
PLATELET # BLD AUTO: 159 10*3/MM3 (ref 140–450)
PMV BLD AUTO: 10.7 FL (ref 6–12)
POTASSIUM SERPL-SCNC: 4.3 MMOL/L (ref 3.5–5.2)
PROCALCITONIN SERPL-MCNC: 1.02 NG/ML (ref 0–0.25)
PROT SERPL-MCNC: 6.3 G/DL (ref 6–8.5)
QT INTERVAL: 340 MS
QTC INTERVAL: 451 MS
RBC # BLD AUTO: 4.23 10*6/MM3 (ref 4.14–5.8)
RBC MORPH BLD: NORMAL
SODIUM SERPL-SCNC: 129 MMOL/L (ref 136–145)
TRIGL SERPL-MCNC: 839 MG/DL (ref 0–150)
VARIANT LYMPHS NFR BLD MANUAL: 5 % (ref 19.6–45.3)
WBC NRBC COR # BLD AUTO: 11.75 10*3/MM3 (ref 3.4–10.8)

## 2024-12-20 PROCEDURE — 84478 ASSAY OF TRIGLYCERIDES: CPT | Performed by: HOSPITALIST

## 2024-12-20 PROCEDURE — 85025 COMPLETE CBC W/AUTO DIFF WBC: CPT | Performed by: STUDENT IN AN ORGANIZED HEALTH CARE EDUCATION/TRAINING PROGRAM

## 2024-12-20 PROCEDURE — 74177 CT ABD & PELVIS W/CONTRAST: CPT | Performed by: RADIOLOGY

## 2024-12-20 PROCEDURE — 25010000002 HYDROMORPHONE 1 MG/ML SOLUTION: Performed by: HOSPITALIST

## 2024-12-20 PROCEDURE — 80048 BASIC METABOLIC PNL TOTAL CA: CPT | Performed by: HOSPITALIST

## 2024-12-20 PROCEDURE — 84145 PROCALCITONIN (PCT): CPT | Performed by: STUDENT IN AN ORGANIZED HEALTH CARE EDUCATION/TRAINING PROGRAM

## 2024-12-20 PROCEDURE — 25010000002 ONDANSETRON PER 1 MG: Performed by: HOSPITALIST

## 2024-12-20 PROCEDURE — 80076 HEPATIC FUNCTION PANEL: CPT | Performed by: STUDENT IN AN ORGANIZED HEALTH CARE EDUCATION/TRAINING PROGRAM

## 2024-12-20 PROCEDURE — 82948 REAGENT STRIP/BLOOD GLUCOSE: CPT

## 2024-12-20 PROCEDURE — 85007 BL SMEAR W/DIFF WBC COUNT: CPT | Performed by: STUDENT IN AN ORGANIZED HEALTH CARE EDUCATION/TRAINING PROGRAM

## 2024-12-20 PROCEDURE — 25510000001 IOPAMIDOL 61 % SOLUTION: Performed by: STUDENT IN AN ORGANIZED HEALTH CARE EDUCATION/TRAINING PROGRAM

## 2024-12-20 PROCEDURE — 25010000002 PROCHLORPERAZINE 10 MG/2ML SOLUTION: Performed by: STUDENT IN AN ORGANIZED HEALTH CARE EDUCATION/TRAINING PROGRAM

## 2024-12-20 PROCEDURE — 99233 SBSQ HOSP IP/OBS HIGH 50: CPT | Performed by: STUDENT IN AN ORGANIZED HEALTH CARE EDUCATION/TRAINING PROGRAM

## 2024-12-20 PROCEDURE — 74177 CT ABD & PELVIS W/CONTRAST: CPT

## 2024-12-20 PROCEDURE — 25010000002 HYDROMORPHONE 1 MG/ML SOLUTION: Performed by: STUDENT IN AN ORGANIZED HEALTH CARE EDUCATION/TRAINING PROGRAM

## 2024-12-20 PROCEDURE — 86140 C-REACTIVE PROTEIN: CPT | Performed by: STUDENT IN AN ORGANIZED HEALTH CARE EDUCATION/TRAINING PROGRAM

## 2024-12-20 RX ORDER — IOPAMIDOL 612 MG/ML
100 INJECTION, SOLUTION INTRAVASCULAR
Status: COMPLETED | OUTPATIENT
Start: 2024-12-20 | End: 2024-12-20

## 2024-12-20 RX ORDER — DEXTROSE MONOHYDRATE 100 MG/ML
1-2 INJECTION, SOLUTION INTRAVENOUS CONTINUOUS
Status: DISCONTINUED | OUTPATIENT
Start: 2024-12-20 | End: 2024-12-21

## 2024-12-20 RX ORDER — DEXTROSE MONOHYDRATE 100 MG/ML
1-2 INJECTION, SOLUTION INTRAVENOUS CONTINUOUS
Status: DISCONTINUED | OUTPATIENT
Start: 2024-12-20 | End: 2024-12-20 | Stop reason: SDUPTHER

## 2024-12-20 RX ORDER — PROCHLORPERAZINE EDISYLATE 5 MG/ML
5 INJECTION INTRAMUSCULAR; INTRAVENOUS EVERY 6 HOURS PRN
Status: DISCONTINUED | OUTPATIENT
Start: 2024-12-20 | End: 2024-12-22 | Stop reason: HOSPADM

## 2024-12-20 RX ORDER — NALOXONE HCL 0.4 MG/ML
0.4 VIAL (ML) INJECTION
Status: DISCONTINUED | OUTPATIENT
Start: 2024-12-20 | End: 2024-12-22 | Stop reason: HOSPADM

## 2024-12-20 RX ADMIN — Medication 1 G: at 08:13

## 2024-12-20 RX ADMIN — ONDANSETRON 4 MG: 2 INJECTION INTRAMUSCULAR; INTRAVENOUS at 11:30

## 2024-12-20 RX ADMIN — PROCHLORPERAZINE EDISYLATE 5 MG: 5 INJECTION INTRAMUSCULAR; INTRAVENOUS at 12:09

## 2024-12-20 RX ADMIN — HYDROMORPHONE HYDROCHLORIDE 1 MG: 1 INJECTION, SOLUTION INTRAMUSCULAR; INTRAVENOUS; SUBCUTANEOUS at 03:54

## 2024-12-20 RX ADMIN — HYDROMORPHONE HYDROCHLORIDE 1 MG: 1 INJECTION, SOLUTION INTRAMUSCULAR; INTRAVENOUS; SUBCUTANEOUS at 16:21

## 2024-12-20 RX ADMIN — DEXTROSE 1.28 ML/KG/HR: 10 SOLUTION INTRAVENOUS at 22:29

## 2024-12-20 RX ADMIN — MUPIROCIN 1 APPLICATION: 20 OINTMENT TOPICAL at 08:13

## 2024-12-20 RX ADMIN — INSULIN HUMAN 0.05 UNITS/KG/HR: 1 INJECTION, SOLUTION INTRAVENOUS at 09:12

## 2024-12-20 RX ADMIN — HYDROMORPHONE HYDROCHLORIDE 1 MG: 1 INJECTION, SOLUTION INTRAMUSCULAR; INTRAVENOUS; SUBCUTANEOUS at 13:35

## 2024-12-20 RX ADMIN — MUPIROCIN 1 APPLICATION: 20 OINTMENT TOPICAL at 20:15

## 2024-12-20 RX ADMIN — BISACODYL 10 MG: 10 SUPPOSITORY RECTAL at 17:32

## 2024-12-20 RX ADMIN — HYDROMORPHONE HYDROCHLORIDE 1 MG: 1 INJECTION, SOLUTION INTRAMUSCULAR; INTRAVENOUS; SUBCUTANEOUS at 22:29

## 2024-12-20 RX ADMIN — Medication 10 ML: at 20:15

## 2024-12-20 RX ADMIN — DEXTROSE 1.39 ML/KG/HR: 10 SOLUTION INTRAVENOUS at 15:08

## 2024-12-20 RX ADMIN — HYDROMORPHONE HYDROCHLORIDE 1 MG: 1 INJECTION, SOLUTION INTRAMUSCULAR; INTRAVENOUS; SUBCUTANEOUS at 10:04

## 2024-12-20 RX ADMIN — HYDROMORPHONE HYDROCHLORIDE 1 MG: 1 INJECTION, SOLUTION INTRAMUSCULAR; INTRAVENOUS; SUBCUTANEOUS at 00:49

## 2024-12-20 RX ADMIN — DEXTROSE 1.5 ML/KG/HR: 10 SOLUTION INTRAVENOUS at 07:06

## 2024-12-20 RX ADMIN — Medication 1 G: at 19:04

## 2024-12-20 RX ADMIN — HYDROMORPHONE HYDROCHLORIDE 1 MG: 1 INJECTION, SOLUTION INTRAMUSCULAR; INTRAVENOUS; SUBCUTANEOUS at 07:06

## 2024-12-20 RX ADMIN — Medication 10 ML: at 08:14

## 2024-12-20 RX ADMIN — HYDROMORPHONE HYDROCHLORIDE 1 MG: 1 INJECTION, SOLUTION INTRAMUSCULAR; INTRAVENOUS; SUBCUTANEOUS at 18:18

## 2024-12-20 RX ADMIN — IOPAMIDOL 90 ML: 612 INJECTION, SOLUTION INTRAVENOUS at 11:15

## 2024-12-20 RX ADMIN — HYDROMORPHONE HYDROCHLORIDE 1 MG: 1 INJECTION, SOLUTION INTRAMUSCULAR; INTRAVENOUS; SUBCUTANEOUS at 20:15

## 2024-12-20 NOTE — PLAN OF CARE
Problem: Adult Inpatient Plan of Care  Goal: Plan of Care Review  Outcome: Progressing  Goal: Patient-Specific Goal (Individualized)  Outcome: Progressing  Goal: Absence of Hospital-Acquired Illness or Injury  Outcome: Progressing  Intervention: Identify and Manage Fall Risk  Recent Flowsheet Documentation  Taken 12/20/2024 0500 by Radha Burrows RN  Safety Promotion/Fall Prevention:   safety round/check completed   fall prevention program maintained  Taken 12/20/2024 0400 by Radha Burrows RN  Safety Promotion/Fall Prevention:   safety round/check completed   fall prevention program maintained  Taken 12/20/2024 0300 by Radha Burrows RN  Safety Promotion/Fall Prevention:   safety round/check completed   fall prevention program maintained  Taken 12/20/2024 0200 by Radha Burrows RN  Safety Promotion/Fall Prevention:   safety round/check completed   fall prevention program maintained  Taken 12/20/2024 0100 by Radha Burrows RN  Safety Promotion/Fall Prevention:   safety round/check completed   fall prevention program maintained  Taken 12/20/2024 0000 by Radha Burrows RN  Safety Promotion/Fall Prevention:   safety round/check completed   fall prevention program maintained  Taken 12/19/2024 2300 by Rdaha Burrows RN  Safety Promotion/Fall Prevention:   safety round/check completed   fall prevention program maintained  Taken 12/19/2024 2000 by Radha Burrows RN  Safety Promotion/Fall Prevention:   safety round/check completed   fall prevention program maintained  Taken 12/19/2024 1900 by Radha Burrows RN  Safety Promotion/Fall Prevention:   safety round/check completed   fall prevention program maintained  Intervention: Prevent Skin Injury  Recent Flowsheet Documentation  Taken 12/20/2024 0400 by Radha Burrows RN  Body Position: position changed independently  Taken 12/20/2024 0200 by Radha Burrows RN  Body Position: position changed independently  Taken 12/20/2024 0000 by Radha Burrows RN  Body  Position: position changed independently  Taken 12/19/2024 2000 by Radha Burrows RN  Body Position: position changed independently  Intervention: Prevent and Manage VTE (Venous Thromboembolism) Risk  Recent Flowsheet Documentation  Taken 12/19/2024 2000 by Radha Burrows RN  VTE Prevention/Management: (pt up in chair) SCDs (sequential compression devices) off  Intervention: Prevent Infection  Recent Flowsheet Documentation  Taken 12/20/2024 0500 by Radha Burrows RN  Infection Prevention:   hand hygiene promoted   rest/sleep promoted  Taken 12/20/2024 0400 by Radha Burrows RN  Infection Prevention:   hand hygiene promoted   rest/sleep promoted  Taken 12/20/2024 0300 by Radha Burrows RN  Infection Prevention:   hand hygiene promoted   rest/sleep promoted  Taken 12/20/2024 0200 by Radha Burrows RN  Infection Prevention:   hand hygiene promoted   rest/sleep promoted  Taken 12/20/2024 0100 by Radha Burrows RN  Infection Prevention:   hand hygiene promoted   rest/sleep promoted  Taken 12/20/2024 0000 by Radha Burrows RN  Infection Prevention:   hand hygiene promoted   rest/sleep promoted  Taken 12/19/2024 2300 by Radha Burrows RN  Infection Prevention:   hand hygiene promoted   rest/sleep promoted  Taken 12/19/2024 2000 by Radha Burrows RN  Infection Prevention:   hand hygiene promoted   rest/sleep promoted  Taken 12/19/2024 1900 by Radha Burrows RN  Infection Prevention:   hand hygiene promoted   rest/sleep promoted  Goal: Optimal Comfort and Wellbeing  Outcome: Progressing  Intervention: Provide Person-Centered Care  Recent Flowsheet Documentation  Taken 12/20/2024 0200 by Radha Burrows RN  Trust Relationship/Rapport:   care explained   questions answered   questions encouraged   reassurance provided   thoughts/feelings acknowledged  Taken 12/19/2024 2000 by Radha Burrows RN  Trust Relationship/Rapport:   care explained   questions encouraged   questions answered   reassurance provided    thoughts/feelings acknowledged  Goal: Readiness for Transition of Care  Outcome: Progressing     Problem: Skin Injury Risk Increased  Goal: Skin Health and Integrity  Outcome: Progressing  Intervention: Optimize Skin Protection  Recent Flowsheet Documentation  Taken 12/20/2024 0400 by Radha Burrows RN  Head of Bed (HOB) Positioning: HOB at 30-45 degrees  Taken 12/20/2024 0200 by Radha Burrows RN  Head of Bed (HOB) Positioning: (up in chair) --  Taken 12/20/2024 0000 by Radha Burrows RN  Head of Bed (HOB) Positioning: HOB at 45 degrees  Taken 12/19/2024 2000 by Radha Burrows RN  Activity Management: up in chair  Head of Bed (HOB) Positioning: HOB at 30-45 degrees     Problem: Sepsis/Septic Shock  Goal: Optimal Coping  Outcome: Progressing  Intervention: Support Patient and Family Response  Recent Flowsheet Documentation  Taken 12/20/2024 0200 by Radha Burrows RN  Family/Support System Care: self-care encouraged  Taken 12/19/2024 2000 by Radha Burrows RN  Family/Support System Care: self-care encouraged  Goal: Absence of Bleeding  Outcome: Progressing  Goal: Blood Glucose Level Within Target Range  Outcome: Progressing  Goal: Absence of Infection Signs and Symptoms  Outcome: Progressing  Intervention: Initiate Sepsis Management  Recent Flowsheet Documentation  Taken 12/20/2024 0500 by Radha Burrows RN  Infection Prevention:   hand hygiene promoted   rest/sleep promoted  Taken 12/20/2024 0400 by Radha Burrows RN  Infection Prevention:   hand hygiene promoted   rest/sleep promoted  Taken 12/20/2024 0300 by Radha Burrows RN  Infection Prevention:   hand hygiene promoted   rest/sleep promoted  Taken 12/20/2024 0200 by Radha Burrows RN  Infection Prevention:   hand hygiene promoted   rest/sleep promoted  Taken 12/20/2024 0100 by Radha Burrows RN  Infection Prevention:   hand hygiene promoted   rest/sleep promoted  Taken 12/20/2024 0000 by Radha Burrows RN  Infection Prevention:   hand hygiene  promoted   rest/sleep promoted  Taken 12/19/2024 2300 by Radha Burrows RN  Infection Prevention:   hand hygiene promoted   rest/sleep promoted  Taken 12/19/2024 2000 by Radha Burrows RN  Infection Prevention:   hand hygiene promoted   rest/sleep promoted  Taken 12/19/2024 1900 by Radha Burrows RN  Infection Prevention:   hand hygiene promoted   rest/sleep promoted  Intervention: Promote Recovery  Recent Flowsheet Documentation  Taken 12/19/2024 2000 by Radha Burrows RN  Activity Management: up in chair  Goal: Optimal Nutrition Delivery  Outcome: Progressing     Problem: Fall Injury Risk  Goal: Absence of Fall and Fall-Related Injury  Outcome: Progressing  Intervention: Identify and Manage Contributors  Recent Flowsheet Documentation  Taken 12/20/2024 0500 by Radha Burrows RN  Medication Review/Management: medications reviewed  Taken 12/20/2024 0400 by Radha Burrows RN  Medication Review/Management: medications reviewed  Taken 12/20/2024 0300 by Radha Burrows RN  Medication Review/Management: medications reviewed  Taken 12/20/2024 0200 by Radha Burrows RN  Medication Review/Management: medications reviewed  Taken 12/20/2024 0100 by Radha Burrows RN  Medication Review/Management: medications reviewed  Taken 12/20/2024 0000 by Radha Burrows RN  Medication Review/Management: medications reviewed  Taken 12/19/2024 2300 by Radha Burrows RN  Medication Review/Management: medications reviewed  Taken 12/19/2024 2000 by Radha Burrows RN  Medication Review/Management: medications reviewed  Taken 12/19/2024 1900 by Radha Burrows RN  Medication Review/Management: medications reviewed  Intervention: Promote Injury-Free Environment  Recent Flowsheet Documentation  Taken 12/20/2024 0500 by Radha Burrows RN  Safety Promotion/Fall Prevention:   safety round/check completed   fall prevention program maintained  Taken 12/20/2024 0400 by Radha Burrows RN  Safety Promotion/Fall Prevention:   safety  round/check completed   fall prevention program maintained  Taken 12/20/2024 0300 by Radha Burrows, RN  Safety Promotion/Fall Prevention:   safety round/check completed   fall prevention program maintained  Taken 12/20/2024 0200 by Radha Burrows RN  Safety Promotion/Fall Prevention:   safety round/check completed   fall prevention program maintained  Taken 12/20/2024 0100 by Radha Burrows, RN  Safety Promotion/Fall Prevention:   safety round/check completed   fall prevention program maintained  Taken 12/20/2024 0000 by Radha Burrows RN  Safety Promotion/Fall Prevention:   safety round/check completed   fall prevention program maintained  Taken 12/19/2024 2300 by Radha Burrows, RN  Safety Promotion/Fall Prevention:   safety round/check completed   fall prevention program maintained  Taken 12/19/2024 2000 by Radha Burrows, RN  Safety Promotion/Fall Prevention:   safety round/check completed   fall prevention program maintained  Taken 12/19/2024 1900 by Radha Burrows, RN  Safety Promotion/Fall Prevention:   safety round/check completed   fall prevention program maintained

## 2024-12-20 NOTE — PROGRESS NOTES
[facility] HOSPITALIST PROGRESS NOTE     Patient Identification:  Name:  Herminio Matson  Age:  53 y.o.  Sex:  male  :  1970  MRN:  [id number]  Visit Number:  [id number]  ROOM: [address]     Primary Care Provider:  Luma Pittman    Length of stay in inpatient status:  3    Subjective     Chief Compliant:    Chief Complaint   Patient presents with    Abdominal Pain       History of Presenting Illness:    Patient seen and examined multiple times this shift. He reported pain was 7/10 this morning after eating some jello. He also reported worsening of his nausea and has required multiple doses of antiemetics. He reports not having a bowel movement in several days.     Objective     Current Hospital Meds:[Held by provider] aspirin, 81 mg, Oral, Daily  [Held by provider] empagliflozin, 10 mg, Oral, Daily  mupirocin, 1 Application, Each Nare, BID  sodium chloride, 10 mL, Intravenous, Q12H  sodium chloride, 1 g, Oral, TID With Meals    dextrose, 1-2 mL/kg/hr, Last Rate: 1.502 mL/kg/hr (24 1732)  insulin, 0.05 Units/kg/hr, Last Rate: 0.05 Units/kg/hr (24 0912)        Current Antimicrobial Therapy:  Anti-Infectives (From admission, onward)      Ordered     Dose/Rate Route Frequency Start Stop    24  piperacillin-tazobactam (ZOSYN) IVPB 3.375 g IVPB in 100 mL NS (VTB)        Ordering Provider: Edgard Gong DO    3.375 g  over 30 Minutes Intravenous Once 24          Current Diuretic Therapy:  Diuretics (From admission, onward)      None          ----------------------------------------------------------------------------------------------------------------------  Vital Signs:  Temp:  [98 °F (36.7 °C)-98.9 °F (37.2 °C)] 98 °F (36.7 °C)  Heart Rate:  [101-122] 110  Resp:  [12-18] 14  BP: (128-169)/(73-95) 128/75  SpO2:  [93 %-98 %] 94 %  on   ;   Device (Oxygen Therapy): room air  Body mass index is 28.31 kg/m².    Wt Readings from Last 3  Encounters:   12/18/24 94.7 kg (208 lb 12.4 oz)   09/08/23 95.6 kg (210 lb 12.2 oz)     Intake & Output (last 3 days)         12/17 0701 12/18 0700 12/18 0701 12/19 0700 12/19 0701 12/20 0700 12/20 0701 12/21 0700    P.O.  610  500    I.V. (mL/kg) 2309.9 (24.4) 1251.6 (13.2) 55.9 (0.6)     Total Intake(mL/kg) 2309.9 (24.4) 1861.6 (19.7) 55.9 (0.6) 500 (5.3)    Urine (mL/kg/hr) 600 (0.3) 1850 (0.8) 1400 (0.6) 700 (0.7)    Emesis/NG output 0       Stool 0 0 0     Total Output 600 1850 1400 700    Net +1709.9 +11.6 -1344.1 -200            Urine Unmeasured Occurrence 3 x  1 x     Stool Unmeasured Occurrence 0 x 0 x 1 x     Emesis Unmeasured Occurrence 0 x             NPO Diet NPO Type: Sips with Meds, Ice Chips  ----------------------------------------------------------------------------------------------------------------------  Physical exam:   Constitutional:  Well-developed and well-nourished. Appeared uncomfortable due to pain. No acute distress.      HENT:  Head:  Normocephalic and atraumatic.    Cardiovascular:  Mild tachycardia, regular rhythm   Pulmonary/Chest:  No respiratory distress, no wheezes, no crackles, no rhonchi  Abdominal: Moderate distention, abdomen soft but more full and  in epigastric and RUQ regions  Musculoskeletal:  No deformity.    Neurological: Awake, alert, no focal deficit on gross examination. No slurred speech or facial droop.   Skin:  Skin is warm and dry.   Peripheral vascular:  No cyanosis, trace lower extremity edema bilaterally  Psychiatric: Appropriate mood and affect  Edited by: Alesha Gunn DO at 12/20/2024 1817  ----------------------------------------------------------------------------------------------------------------------  Results from last 7 days   Lab Units 12/20/24  1559 12/20/24  0034 12/17/24  0541 12/16/24  1935 12/16/24  1658   CRP mg/dL  --  27.50*  --   --   --    LACTATE mmol/L  --   --  2.4*  --   --    WBC 10*3/mm3 11.75*  --  10.60  --   "16.98*   HEMOGLOBIN g/dL 12.9*  --  18.7*  --  18.0*   HEMATOCRIT % 39.1  --  51.7*  --  49.8   MCV fL 92.4  --  89.9  --  91.0   MCHC g/dL 33.0  --  36.2*  --  36.4*   PLATELETS 10*3/mm3 159  --  312  --  267   INR   --   --   --  0.97  --          Results from last 7 days   Lab Units 12/20/24  0034 12/19/24  0105 12/18/24  2120 12/18/24  1010 12/17/24  0953 12/17/24  0541 12/16/24  2303   SODIUM mmol/L 129* 126* 128* 125*   < > 131*  --    POTASSIUM mmol/L 4.3 4.4 4.3 4.1   < > 4.7  --    MAGNESIUM mg/dL  --   --   --  1.8  --  2.1  --    CHLORIDE mmol/L 96* 98 100 95*   < > 100  --    CO2 mmol/L 22.7 16.2* 17.8* 18.3*   < > 20.4*  --    BUN mg/dL 12 21* 22* 31*   < > 25*  --    CREATININE mg/dL 0.67* 0.73* 0.78 1.02   < > 1.49*  --    CALCIUM mg/dL 8.2* 7.6* 7.7* 7.4*   < > 7.2*  --    PHOSPHORUS mg/dL  --   --   --  2.2*  --  2.4* 3.1   GLUCOSE mg/dL 166* 204* 196* 196*   < > 268*  --    ALBUMIN g/dL 2.4*  --   --   --   --  3.2*  --    BILIRUBIN mg/dL 0.5  --   --   --   --  0.5  --    ALK PHOS U/L 64  --   --   --   --  48  --    AST (SGOT) U/L 18  --   --   --   --  82*  --    ALT (SGPT) U/L 17  --   --   --   --  31  --     < > = values in this interval not displayed.   Estimated Creatinine Clearance: 148.7 mL/min (A) (by C-G formula based on SCr of 0.67 mg/dL (L)).  No results found for: \"AMMONIA\"          Results from last 7 days   Lab Units 12/20/24  0034 12/16/24  2215 12/16/24  1658   CHOLESTEROL mg/dL  --   --  1,185*   TRIGLYCERIDES mg/dL 839*   < > >4,425*   HDL CHOL mg/dL  --   --  17*    < > = values in this interval not displayed.     Glucose   Date/Time Value Ref Range Status   12/20/2024 1731 208 (H) 70 - 130 mg/dL Final   12/20/2024 1620 187 (H) 70 - 130 mg/dL Final   12/20/2024 1504 184 (H) 70 - 130 mg/dL Final   12/20/2024 1332 226 (H) 70 - 130 mg/dL Final   12/20/2024 1212 195 (H) 70 - 130 mg/dL Final   12/20/2024 1125 174 (H) 70 - 130 mg/dL Final   12/20/2024 1006 187 (H) 70 - 130 mg/dL " "Final   12/20/2024 0911 177 (H) 70 - 130 mg/dL Final     No results found for: \"TSH\", \"FREET4\"  No results found for: \"PREGTESTUR\", \"PREGSERUM\", \"HCG\", \"HCGQUANT\"  Pain Management Panel           No data to display              Brief Urine Lab Results  (Last result in the past 365 days)        Color   Clarity   Blood   Leuk Est   Nitrite   Protein   CREAT   Urine HCG        12/16/24 1655 Yellow   Clear   Negative   Negative   Negative   Trace                 Blood Culture   Date Value Ref Range Status   12/16/2024 No growth at 3 days  Preliminary   12/16/2024 No growth at 3 days  Preliminary     No results found for: \"URINECX\"  No results found for: \"WOUNDCX\"  No results found for: \"STOOLCX\"  No results found for: \"RESPCX\"  No results found for: \"AFBCX\"  Results from last 7 days   Lab Units 12/20/24  1559 12/20/24  0034 12/17/24  0541   PROCALCITONIN ng/mL 1.02*  --   --    LACTATE mmol/L  --   --  2.4*   CRP mg/dL  --  27.50*  --        I have personally looked at the labs and they are summarized above.  ----------------------------------------------------------------------------------------------------------------------  Detailed radiology reports for the last 24 hours:  Imaging Results (Last 24 Hours)       Procedure Component Value Units Date/Time    CT Abdomen Pelvis With Contrast [[id number]] Collected: 12/20/24 1121     Updated: 12/20/24 1125    Narrative:      EXAM: CT ABDOMEN PELVIS W CONTRAST-         TECHNIQUE: Multiple axial CT images were obtained from lung bases  through pubic symphysis WITH administration of IV contrast. Reformatted  images in the coronal and/or sagittal plane(s) were generated from the  axial data set to facilitate diagnostic accuracy and/or surgical  planning.  Oral Contrast:NONE.     Radiation dose reduction techniques were utilized per ALARA protocol.  Automated exposure control was initiated through either or CareDose or  DoseRight software packages by  protocol.  "   DOSE:     Clinical information abdominal pain/nausea, known pancreatitis,  worsening abdominal distention; K85.90-Acute pancreatitis without  necrosis or infection, unspecified      Comparison 12/16/2024     FINDINGS:     Lower thorax: Clear. No effusions.     Abdomen:     Liver: Homogeneous. No focal hepatic mass or ductal dilatation.     Gallbladder: No dilation or stone identified.     Pancreas: Extensive peripancreatic stranding and peripancreatic fluid  compatible with extensive pancreatitis, worse by imaging today than on  the prior study.     Spleen: Homogeneous. No splenomegaly.     Adrenals: No mass.     Kidneys/ureters: No mass. No obstructive uropathy.  No evidence of  urolithiasis.     GI tract: Marked thickening of the duodenum, presumably due to adjacent  pancreatitis.. There is no evidence of appendicitis     MESENTERY: Free fluid small amount     Vasculature: Evidence of atherosclerotic vascular disease     Abdominal wall: No focal hernia or mass.        Bladder: No focal mass or significant wall thickening     Reproductive: Unremarkable as visualized     Bones: No acute bony abnormality.       Impression:         1. Appearance suggestive of progression of pancreatitis with extensive  peripancreatic stranding and peripancreatic fluid. No abscess at this  time     2. Extensive thickening of the adjacent duodenum, presumably from the  pancreatitis                          This report was finalized on 12/20/2024 11:23 AM by Dr. Braden Landin MD.             Assessment & Plan      #Acute pancreatitis secondary to severe hypertriglyceridemia  #SIRS criteria due to above  #Pseudohyponatremia, now resolved and with true hyponatremia  #Acute kidney injury  - CT was repeated today due to persistent abdominal pain and worsening abdominal distention. This showed increase in peripancreatic stranding and peripancreatic fluid with no abscess noted. This change is not entirely unexpected as it can take 72-96  hours for the evolution of the pancreatitis to manifest on imaging after onset of symptoms.   - procalcitonin was checked at admission but was not able to be accurately assessed due to lipemia of the sample. Procalcitonin was 1.02 today. CRP was not previously checked but was 27.5 today.   - Since no evidence of necrosis was seen on CT at admission or today there is currently no indication for antibiotics. Repeat inflammatory markers and CBC tomorrow to trend. If they are worsening then would consider initiating antibiotics. Consider repeat CT scan in 24-48 hours if symptoms are not improving, to rule out development of abscess or necrosis.  - Patient's case was discussed with [facility] at admission and patient not felt to need plasmapheresis. Recommendation was to treat with insulin drip and monitor serial triglyceride level. Triglyceride level has trended down from too high to read (>4,425) to 839.  - VS have remained stable. He has been mildly tachycardic throughout most of admission.  - Continue insulin drip until triglyceride level is <500. Continue monitoring glucose q1h  - creatinine has now returned to normal after IV fluids.   - Sodium level decreased down to 125 after receiving D10W infusion needed to prevent hypoglycemia while on the insulin drip. Due to the need to continue the dextrose infusion, and risk of worsening volume overload with additional IV fluids, sodium chloride tablets were started and sodium level has remained stable and is improving at 129 today. Continue BMP q12h.  - Due to significant worsening of pain and nausea with minimal PO intake his diet was decreased back to NPO, sips with meds and ice chips today. If he is unable to advance diet by tomorrow (which will be 5 days without significant PO intake) then he may need attempt at enteral nutrition vs TPN.  - continue pain control with prn dilaudid. Continue antiemetics with zofran and compazine.    #Constipation  - PRN bowel regimen agents  available.    #Type II DM  - insulin drip for hypertriglyceridemia as above  - maintain glucose 150-200, using D5 infusion if necessary    # Bell's palsy  -Supportive care  Edited by: Alesha Gunn DO at 12/20/2024 1817    Active VTE Prophylaxis  Mechanical:        Start        12/17/24 0022  Maintain Sequential Compression Device  Continuous                            Dispo: pending clinical progress    Alesha Gunn DO  [facility]  12/20/24  18:17 EST

## 2024-12-20 NOTE — PLAN OF CARE
Goal Outcome Evaluation:  Plan of Care Reviewed With: patient, spouse        Progress: no change  Outcome Evaluation: A/O x4. Sinus tach. Pt c/o stomach pain and nausea throughout shift. PRN zofran administered with no response, compazine given and pt stated some relief. PRN suppository administered, no BM since admission. Pt diet changed from clear liquids to NPO sips with meds. Pt ambulating around room and nurses station indepednently. Insulin and D10 continue to infuse. Alarms active. Fall prevention measures in place.       Problem: Adult Inpatient Plan of Care  Goal: Plan of Care Review    Goal: Patient-Specific Goal (Individualized)  Outcome: Progressing  Goal: Absence of Hospital-Acquired Illness or Injury  Outcome: Progressing  Intervention: Identify and Manage Fall Risk  Recent Flowsheet Documentation  Taken 12/20/2024 1700 by Karla Cates RN  Safety Promotion/Fall Prevention:   fall prevention program maintained   safety round/check completed  Taken 12/20/2024 1600 by Karla Caets RN  Safety Promotion/Fall Prevention:   fall prevention program maintained   safety round/check completed  Taken 12/20/2024 1500 by Karla Cates RN  Safety Promotion/Fall Prevention:   fall prevention program maintained   safety round/check completed  Taken 12/20/2024 1400 by Karla Cates RN  Safety Promotion/Fall Prevention:   fall prevention program maintained   safety round/check completed  Taken 12/20/2024 1300 by Karla Cates RN  Safety Promotion/Fall Prevention:   fall prevention program maintained   safety round/check completed  Taken 12/20/2024 1200 by Karla Cates RN  Safety Promotion/Fall Prevention:   fall prevention program maintained   safety round/check completed  Taken 12/20/2024 1100 by Karla Cates RN  Safety Promotion/Fall Prevention:   fall prevention program maintained   safety round/check completed  Taken 12/20/2024 1000 by Karla Cates RN  Safety Promotion/Fall Prevention:   fall prevention program  maintained   safety round/check completed  Taken 12/20/2024 0900 by Karla Cates RN  Safety Promotion/Fall Prevention:   fall prevention program maintained   safety round/check completed  Taken 12/20/2024 0800 by Karla Cates RN  Safety Promotion/Fall Prevention:   fall prevention program maintained   safety round/check completed  Taken 12/20/2024 0700 by Karla Cates RN  Safety Promotion/Fall Prevention:   fall prevention program maintained   safety round/check completed  Intervention: Prevent Skin Injury  Recent Flowsheet Documentation  Taken 12/20/2024 1600 by Karla Cates RN  Body Position:   position changed independently   weight shifting  Taken 12/20/2024 1400 by Karla Cates RN  Body Position: position changed independently  Skin Protection:   pulse oximeter probe site changed   transparent dressing maintained  Taken 12/20/2024 1200 by Karla Cates RN  Body Position:   position changed independently   weight shifting  Taken 12/20/2024 1000 by Karla Cates RN  Body Position:   position changed independently   weight shifting  Taken 12/20/2024 0800 by Karla Cates RN  Body Position:   position changed independently   weight shifting  Skin Protection: skin sealant/moisture barrier applied  Intervention: Prevent and Manage VTE (Venous Thromboembolism) Risk  Recent Flowsheet Documentation  Taken 12/20/2024 0800 by Karla Cates RN  VTE Prevention/Management: (Pt ambulating around the room frequently)   SCDs (sequential compression devices) off   patient refused intervention  Goal: Optimal Comfort and Wellbeing  Outcome: Progressing  Intervention: Provide Person-Centered Care  Recent Flowsheet Documentation  Taken 12/20/2024 0800 by Karla Cates RN  Trust Relationship/Rapport:   choices provided   care explained   emotional support provided   empathic listening provided   questions answered   questions encouraged   reassurance provided   thoughts/feelings acknowledged  Goal: Readiness for Transition of  Care  Outcome: Progressing

## 2024-12-21 LAB
ANION GAP SERPL CALCULATED.3IONS-SCNC: 11.6 MMOL/L (ref 5–15)
BACTERIA SPEC AEROBE CULT: NORMAL
BACTERIA SPEC AEROBE CULT: NORMAL
BUN SERPL-MCNC: 8 MG/DL (ref 6–20)
BUN/CREAT SERPL: 13.8 (ref 7–25)
CALCIUM SPEC-SCNC: 8.5 MG/DL (ref 8.6–10.5)
CHLORIDE SERPL-SCNC: 96 MMOL/L (ref 98–107)
CO2 SERPL-SCNC: 23.4 MMOL/L (ref 22–29)
CREAT SERPL-MCNC: 0.58 MG/DL (ref 0.76–1.27)
CRP SERPL-MCNC: 29.17 MG/DL (ref 0–0.5)
DEPRECATED RDW RBC AUTO: 47.2 FL (ref 37–54)
EGFRCR SERPLBLD CKD-EPI 2021: 115.2 ML/MIN/1.73
ERYTHROCYTE [DISTWIDTH] IN BLOOD BY AUTOMATED COUNT: 13.8 % (ref 12.3–15.4)
GLUCOSE BLDC GLUCOMTR-MCNC: 146 MG/DL (ref 70–130)
GLUCOSE BLDC GLUCOMTR-MCNC: 153 MG/DL (ref 70–130)
GLUCOSE BLDC GLUCOMTR-MCNC: 158 MG/DL (ref 70–130)
GLUCOSE BLDC GLUCOMTR-MCNC: 159 MG/DL (ref 70–130)
GLUCOSE BLDC GLUCOMTR-MCNC: 166 MG/DL (ref 70–130)
GLUCOSE BLDC GLUCOMTR-MCNC: 166 MG/DL (ref 70–130)
GLUCOSE BLDC GLUCOMTR-MCNC: 183 MG/DL (ref 70–130)
GLUCOSE BLDC GLUCOMTR-MCNC: 188 MG/DL (ref 70–130)
GLUCOSE BLDC GLUCOMTR-MCNC: 203 MG/DL (ref 70–130)
GLUCOSE BLDC GLUCOMTR-MCNC: 203 MG/DL (ref 70–130)
GLUCOSE SERPL-MCNC: 161 MG/DL (ref 65–99)
HCT VFR BLD AUTO: 37.3 % (ref 37.5–51)
HGB BLD-MCNC: 12.2 G/DL (ref 13–17.7)
LIPASE SERPL-CCNC: 56 U/L (ref 13–60)
MCH RBC QN AUTO: 30.1 PG (ref 26.6–33)
MCHC RBC AUTO-ENTMCNC: 32.7 G/DL (ref 31.5–35.7)
MCV RBC AUTO: 92.1 FL (ref 79–97)
PLATELET # BLD AUTO: 166 10*3/MM3 (ref 140–450)
PMV BLD AUTO: 10.7 FL (ref 6–12)
POTASSIUM SERPL-SCNC: 3.6 MMOL/L (ref 3.5–5.2)
POTASSIUM SERPL-SCNC: 3.9 MMOL/L (ref 3.5–5.2)
PROCALCITONIN SERPL-MCNC: 0.86 NG/ML (ref 0–0.25)
RBC # BLD AUTO: 4.05 10*6/MM3 (ref 4.14–5.8)
SODIUM SERPL-SCNC: 131 MMOL/L (ref 136–145)
TRIGL SERPL-MCNC: 393 MG/DL (ref 0–150)
WBC NRBC COR # BLD AUTO: 14.73 10*3/MM3 (ref 3.4–10.8)

## 2024-12-21 PROCEDURE — 84478 ASSAY OF TRIGLYCERIDES: CPT | Performed by: HOSPITALIST

## 2024-12-21 PROCEDURE — 84132 ASSAY OF SERUM POTASSIUM: CPT | Performed by: STUDENT IN AN ORGANIZED HEALTH CARE EDUCATION/TRAINING PROGRAM

## 2024-12-21 PROCEDURE — 80048 BASIC METABOLIC PNL TOTAL CA: CPT | Performed by: HOSPITALIST

## 2024-12-21 PROCEDURE — 25010000002 HYDROMORPHONE 1 MG/ML SOLUTION: Performed by: STUDENT IN AN ORGANIZED HEALTH CARE EDUCATION/TRAINING PROGRAM

## 2024-12-21 PROCEDURE — 85027 COMPLETE CBC AUTOMATED: CPT | Performed by: STUDENT IN AN ORGANIZED HEALTH CARE EDUCATION/TRAINING PROGRAM

## 2024-12-21 PROCEDURE — 63710000001 INSULIN LISPRO (HUMAN) PER 5 UNITS: Performed by: STUDENT IN AN ORGANIZED HEALTH CARE EDUCATION/TRAINING PROGRAM

## 2024-12-21 PROCEDURE — 82948 REAGENT STRIP/BLOOD GLUCOSE: CPT

## 2024-12-21 PROCEDURE — 25010000002 PROCHLORPERAZINE 10 MG/2ML SOLUTION: Performed by: STUDENT IN AN ORGANIZED HEALTH CARE EDUCATION/TRAINING PROGRAM

## 2024-12-21 PROCEDURE — 86140 C-REACTIVE PROTEIN: CPT | Performed by: STUDENT IN AN ORGANIZED HEALTH CARE EDUCATION/TRAINING PROGRAM

## 2024-12-21 PROCEDURE — 25010000002 ONDANSETRON PER 1 MG: Performed by: HOSPITALIST

## 2024-12-21 PROCEDURE — 99232 SBSQ HOSP IP/OBS MODERATE 35: CPT | Performed by: STUDENT IN AN ORGANIZED HEALTH CARE EDUCATION/TRAINING PROGRAM

## 2024-12-21 PROCEDURE — 84145 PROCALCITONIN (PCT): CPT | Performed by: STUDENT IN AN ORGANIZED HEALTH CARE EDUCATION/TRAINING PROGRAM

## 2024-12-21 PROCEDURE — 83690 ASSAY OF LIPASE: CPT | Performed by: HOSPITALIST

## 2024-12-21 RX ORDER — CALCIUM CARBONATE 500 MG/1
2 TABLET, CHEWABLE ORAL 3 TIMES DAILY PRN
Status: DISCONTINUED | OUTPATIENT
Start: 2024-12-21 | End: 2024-12-22 | Stop reason: HOSPADM

## 2024-12-21 RX ORDER — FENOFIBRATE 145 MG/1
145 TABLET, COATED ORAL DAILY
Status: DISCONTINUED | OUTPATIENT
Start: 2024-12-21 | End: 2024-12-22 | Stop reason: HOSPADM

## 2024-12-21 RX ORDER — NICOTINE POLACRILEX 4 MG
15 LOZENGE BUCCAL
Status: DISCONTINUED | OUTPATIENT
Start: 2024-12-21 | End: 2024-12-22 | Stop reason: HOSPADM

## 2024-12-21 RX ORDER — GLUCAGON 1 MG/ML
1 KIT INJECTION
Status: DISCONTINUED | OUTPATIENT
Start: 2024-12-21 | End: 2024-12-22 | Stop reason: HOSPADM

## 2024-12-21 RX ORDER — INSULIN LISPRO 100 [IU]/ML
2-7 INJECTION, SOLUTION INTRAVENOUS; SUBCUTANEOUS
Status: DISCONTINUED | OUTPATIENT
Start: 2024-12-21 | End: 2024-12-22 | Stop reason: HOSPADM

## 2024-12-21 RX ORDER — POTASSIUM CHLORIDE 1500 MG/1
40 TABLET, EXTENDED RELEASE ORAL EVERY 4 HOURS
Status: COMPLETED | OUTPATIENT
Start: 2024-12-21 | End: 2024-12-21

## 2024-12-21 RX ORDER — HYDROCODONE BITARTRATE AND ACETAMINOPHEN 5; 325 MG/1; MG/1
1 TABLET ORAL EVERY 4 HOURS PRN
Status: DISCONTINUED | OUTPATIENT
Start: 2024-12-21 | End: 2024-12-22 | Stop reason: HOSPADM

## 2024-12-21 RX ORDER — DEXTROSE MONOHYDRATE 25 G/50ML
25 INJECTION, SOLUTION INTRAVENOUS
Status: DISCONTINUED | OUTPATIENT
Start: 2024-12-21 | End: 2024-12-22 | Stop reason: HOSPADM

## 2024-12-21 RX ORDER — FAMOTIDINE 20 MG/1
20 TABLET, FILM COATED ORAL
Status: DISCONTINUED | OUTPATIENT
Start: 2024-12-21 | End: 2024-12-22 | Stop reason: HOSPADM

## 2024-12-21 RX ADMIN — POLYETHYLENE GLYCOL (3350) 17 G: 17 POWDER, FOR SOLUTION ORAL at 09:00

## 2024-12-21 RX ADMIN — HYDROCODONE BITARTRATE AND ACETAMINOPHEN 1 TABLET: 5; 325 TABLET ORAL at 11:33

## 2024-12-21 RX ADMIN — HYDROMORPHONE HYDROCHLORIDE 1 MG: 1 INJECTION, SOLUTION INTRAMUSCULAR; INTRAVENOUS; SUBCUTANEOUS at 00:29

## 2024-12-21 RX ADMIN — CALCIUM CARBONATE (ANTACID) CHEW TAB 500 MG 2 TABLET: 500 CHEW TAB at 18:42

## 2024-12-21 RX ADMIN — FENOFIBRATE 145 MG: 145 TABLET ORAL at 09:12

## 2024-12-21 RX ADMIN — PROCHLORPERAZINE EDISYLATE 5 MG: 5 INJECTION INTRAMUSCULAR; INTRAVENOUS at 18:54

## 2024-12-21 RX ADMIN — POTASSIUM CHLORIDE 40 MEQ: 20 TABLET, EXTENDED RELEASE ORAL at 11:22

## 2024-12-21 RX ADMIN — SENNOSIDES AND DOCUSATE SODIUM 2 TABLET: 50; 8.6 TABLET ORAL at 09:00

## 2024-12-21 RX ADMIN — HYDROCODONE BITARTRATE AND ACETAMINOPHEN 1 TABLET: 5; 325 TABLET ORAL at 18:54

## 2024-12-21 RX ADMIN — MUPIROCIN 1 APPLICATION: 20 OINTMENT TOPICAL at 09:02

## 2024-12-21 RX ADMIN — HYDROMORPHONE HYDROCHLORIDE 1 MG: 1 INJECTION, SOLUTION INTRAMUSCULAR; INTRAVENOUS; SUBCUTANEOUS at 21:48

## 2024-12-21 RX ADMIN — Medication 1 G: at 11:22

## 2024-12-21 RX ADMIN — INSULIN LISPRO 2 UNITS: 100 INJECTION, SOLUTION INTRAVENOUS; SUBCUTANEOUS at 21:06

## 2024-12-21 RX ADMIN — ONDANSETRON 4 MG: 2 INJECTION INTRAMUSCULAR; INTRAVENOUS at 21:48

## 2024-12-21 RX ADMIN — Medication 1 G: at 09:00

## 2024-12-21 RX ADMIN — HYDROMORPHONE HYDROCHLORIDE 1 MG: 1 INJECTION, SOLUTION INTRAMUSCULAR; INTRAVENOUS; SUBCUTANEOUS at 02:30

## 2024-12-21 RX ADMIN — Medication 10 ML: at 21:06

## 2024-12-21 RX ADMIN — FAMOTIDINE 20 MG: 20 TABLET ORAL at 11:21

## 2024-12-21 RX ADMIN — HYDROMORPHONE HYDROCHLORIDE 1 MG: 1 INJECTION, SOLUTION INTRAMUSCULAR; INTRAVENOUS; SUBCUTANEOUS at 16:16

## 2024-12-21 RX ADMIN — HYDROMORPHONE HYDROCHLORIDE 1 MG: 1 INJECTION, SOLUTION INTRAMUSCULAR; INTRAVENOUS; SUBCUTANEOUS at 04:33

## 2024-12-21 RX ADMIN — INSULIN LISPRO 3 UNITS: 100 INJECTION, SOLUTION INTRAVENOUS; SUBCUTANEOUS at 16:21

## 2024-12-21 RX ADMIN — HYDROMORPHONE HYDROCHLORIDE 1 MG: 1 INJECTION, SOLUTION INTRAMUSCULAR; INTRAVENOUS; SUBCUTANEOUS at 06:40

## 2024-12-21 RX ADMIN — Medication 1 G: at 18:42

## 2024-12-21 RX ADMIN — FAMOTIDINE 20 MG: 20 TABLET ORAL at 16:17

## 2024-12-21 RX ADMIN — Medication 10 ML: at 09:01

## 2024-12-21 RX ADMIN — POTASSIUM CHLORIDE 40 MEQ: 20 TABLET, EXTENDED RELEASE ORAL at 09:00

## 2024-12-21 RX ADMIN — INSULIN LISPRO 3 UNITS: 100 INJECTION, SOLUTION INTRAVENOUS; SUBCUTANEOUS at 11:21

## 2024-12-21 NOTE — PLAN OF CARE
Problem: Adult Inpatient Plan of Care  Goal: Plan of Care Review  Outcome: Progressing  Goal: Patient-Specific Goal (Individualized)  Outcome: Progressing  Goal: Absence of Hospital-Acquired Illness or Injury  Outcome: Progressing  Intervention: Identify and Manage Fall Risk  Recent Flowsheet Documentation  Taken 12/21/2024 0600 by Radha Burrows RN  Safety Promotion/Fall Prevention:   safety round/check completed   fall prevention program maintained  Taken 12/21/2024 0300 by Radha Burrows RN  Safety Promotion/Fall Prevention:   safety round/check completed   fall prevention program maintained  Taken 12/21/2024 0200 by Radha Burrows RN  Safety Promotion/Fall Prevention:   safety round/check completed   fall prevention program maintained  Taken 12/21/2024 0100 by Radha Burrows RN  Safety Promotion/Fall Prevention:   safety round/check completed   fall prevention program maintained  Taken 12/20/2024 2300 by Radha Burrows RN  Safety Promotion/Fall Prevention:   safety round/check completed   fall prevention program maintained  Taken 12/20/2024 2200 by Radha Burrows RN  Safety Promotion/Fall Prevention:   safety round/check completed   fall prevention program maintained  Taken 12/20/2024 2100 by Radha Burrows RN  Safety Promotion/Fall Prevention:   safety round/check completed   fall prevention program maintained  Taken 12/20/2024 2000 by Radha Burrows RN  Safety Promotion/Fall Prevention:   safety round/check completed   fall prevention program maintained  Taken 12/20/2024 1900 by Radha Burrows RN  Safety Promotion/Fall Prevention:   safety round/check completed   fall prevention program maintained  Intervention: Prevent Skin Injury  Recent Flowsheet Documentation  Taken 12/21/2024 0600 by Radha Burrows RN  Body Position: position changed independently  Taken 12/21/2024 0200 by Radha Burrows RN  Body Position:   turned   side-lying  Taken 12/20/2024 2200 by Radha Burrows RN  Body Position:  position changed independently  Taken 12/20/2024 2000 by Radha Burrows RN  Body Position: position changed independently  Skin Protection:   incontinence pads utilized   pulse oximeter probe site changed   transparent dressing maintained  Intervention: Prevent Infection  Recent Flowsheet Documentation  Taken 12/21/2024 0600 by Radha Burrows RN  Infection Prevention:   hand hygiene promoted   rest/sleep promoted  Taken 12/21/2024 0300 by Radha Burrows RN  Infection Prevention:   hand hygiene promoted   rest/sleep promoted  Taken 12/21/2024 0200 by Radha Burrows RN  Infection Prevention:   hand hygiene promoted   rest/sleep promoted  Taken 12/21/2024 0100 by Radha Burrows RN  Infection Prevention:   hand hygiene promoted   rest/sleep promoted  Taken 12/20/2024 2300 by Radha Burrows RN  Infection Prevention:   hand hygiene promoted   rest/sleep promoted  Taken 12/20/2024 2200 by Radha Burrows RN  Infection Prevention:   hand hygiene promoted   rest/sleep promoted  Taken 12/20/2024 2100 by Radha Burrows RN  Infection Prevention:   hand hygiene promoted   rest/sleep promoted  Taken 12/20/2024 2000 by Radha Burrows RN  Infection Prevention:   hand hygiene promoted   rest/sleep promoted  Taken 12/20/2024 1900 by Radha Burrows RN  Infection Prevention:   hand hygiene promoted   rest/sleep promoted  Goal: Optimal Comfort and Wellbeing  Outcome: Progressing  Intervention: Monitor Pain and Promote Comfort  Recent Flowsheet Documentation  Taken 12/20/2024 2000 by Radha Burrows RN  Pain Management Interventions:   care clustered   pillow support provided   position adjusted   quiet environment facilitated   relaxation techniques promoted  Intervention: Provide Person-Centered Care  Recent Flowsheet Documentation  Taken 12/21/2024 0200 by Radha Burrows RN  Trust Relationship/Rapport:   care explained   questions answered   questions encouraged   reassurance provided  Taken 12/20/2024 2000 by Markos  Radha RN  Trust Relationship/Rapport:   care explained   emotional support provided   questions answered   reassurance provided   thoughts/feelings acknowledged  Goal: Readiness for Transition of Care  Outcome: Progressing     Problem: Skin Injury Risk Increased  Goal: Skin Health and Integrity  Outcome: Progressing  Intervention: Optimize Skin Protection  Recent Flowsheet Documentation  Taken 12/21/2024 0600 by Rdaha Burrows, RN  Head of Bed (HOB) Positioning: HOB at 30-45 degrees  Taken 12/21/2024 0200 by Radha Burrows, RN  Activity Management: (ambulated down the nguyen, around the nurse's station and back to room) ambulated outside room  Head of Bed (HOB) Positioning: HOB at 30-45 degrees  Taken 12/20/2024 2200 by Radha Burrows, RN  Activity Management: up in chair  Head of Bed (HOB) Positioning: HOB at 30-45 degrees  Taken 12/20/2024 2000 by Radha Burrows, RN  Activity Management: activity encouraged  Pressure Reduction Techniques:   frequent weight shift encouraged   heels elevated off bed  Head of Bed (HOB) Positioning: HOB at 30-45 degrees  Pressure Reduction Devices: positioning supports utilized  Skin Protection:   incontinence pads utilized   pulse oximeter probe site changed   transparent dressing maintained     Problem: Sepsis/Septic Shock  Goal: Optimal Coping  Outcome: Progressing  Intervention: Support Patient and Family Response  Recent Flowsheet Documentation  Taken 12/21/2024 0200 by Radha Burrows, RN  Family/Support System Care:   presence promoted   self-care encouraged   support provided  Taken 12/20/2024 2000 by Radha Burrows, RN  Supportive Measures:   relaxation techniques promoted   self-care encouraged  Family/Support System Care:   support provided   self-care encouraged  Goal: Absence of Bleeding  Outcome: Progressing  Goal: Blood Glucose Level Within Target Range  Outcome: Progressing  Goal: Absence of Infection Signs and Symptoms  Outcome: Progressing  Intervention: Initiate  Sepsis Management  Recent Flowsheet Documentation  Taken 12/21/2024 0600 by Radha Burrows RN  Infection Prevention:   hand hygiene promoted   rest/sleep promoted  Taken 12/21/2024 0300 by Radha Burrows RN  Infection Prevention:   hand hygiene promoted   rest/sleep promoted  Taken 12/21/2024 0200 by Radha Burrows RN  Infection Prevention:   hand hygiene promoted   rest/sleep promoted  Taken 12/21/2024 0100 by Radha Burrows RN  Infection Prevention:   hand hygiene promoted   rest/sleep promoted  Taken 12/20/2024 2300 by Radha Burrows RN  Infection Prevention:   hand hygiene promoted   rest/sleep promoted  Taken 12/20/2024 2200 by Radha Burrows RN  Infection Prevention:   hand hygiene promoted   rest/sleep promoted  Taken 12/20/2024 2100 by Radha Burrows RN  Infection Prevention:   hand hygiene promoted   rest/sleep promoted  Taken 12/20/2024 2000 by Radha Burrows RN  Infection Prevention:   hand hygiene promoted   rest/sleep promoted  Taken 12/20/2024 1900 by Radha Burrows RN  Infection Prevention:   hand hygiene promoted   rest/sleep promoted  Intervention: Promote Stabilization  Recent Flowsheet Documentation  Taken 12/20/2024 2000 by Radha Burrows RN  Fever Reduction/Comfort Measures:   lightweight bedding   lightweight clothing  Intervention: Promote Recovery  Recent Flowsheet Documentation  Taken 12/21/2024 0200 by Radha Burrows RN  Activity Management: (ambulated down the nguyen, around the nurse's station and back to room) ambulated outside room  Taken 12/20/2024 2200 by Radha Burrows RN  Activity Management: up in chair  Taken 12/20/2024 2000 by Radha Burrows RN  Activity Management: activity encouraged  Sleep/Rest Enhancement:   awakenings minimized   consistent schedule promoted   noise level reduced   regular sleep/rest pattern promoted   relaxation techniques promoted  Goal: Optimal Nutrition Delivery  Outcome: Progressing     Problem: Fall Injury Risk  Goal: Absence of Fall and  Fall-Related Injury  Outcome: Progressing  Intervention: Identify and Manage Contributors  Recent Flowsheet Documentation  Taken 12/21/2024 0600 by Radha Burrows, RN  Medication Review/Management: medications reviewed  Taken 12/21/2024 0300 by Radha Burrows RN  Medication Review/Management: medications reviewed  Taken 12/21/2024 0200 by Radha Burrows RN  Medication Review/Management: medications reviewed  Taken 12/21/2024 0100 by Radha Burrows RN  Medication Review/Management: medications reviewed  Taken 12/20/2024 2300 by Radha uBrrows, RN  Medication Review/Management: medications reviewed  Taken 12/20/2024 2200 by Radha Burrows, RN  Medication Review/Management: medications reviewed  Taken 12/20/2024 2100 by Radha Burrows RN  Medication Review/Management: medications reviewed  Taken 12/20/2024 2000 by Radha Burrows RN  Medication Review/Management: medications reviewed  Taken 12/20/2024 1900 by Radha Burrows RN  Medication Review/Management: medications reviewed  Intervention: Promote Injury-Free Environment  Recent Flowsheet Documentation  Taken 12/21/2024 0600 by Radha Burrosw RN  Safety Promotion/Fall Prevention:   safety round/check completed   fall prevention program maintained  Taken 12/21/2024 0300 by Radha Burrows RN  Safety Promotion/Fall Prevention:   safety round/check completed   fall prevention program maintained  Taken 12/21/2024 0200 by Radha Burrows RN  Safety Promotion/Fall Prevention:   safety round/check completed   fall prevention program maintained  Taken 12/21/2024 0100 by Radha Burrows, RN  Safety Promotion/Fall Prevention:   safety round/check completed   fall prevention program maintained  Taken 12/20/2024 2300 by Radha Burrows, RN  Safety Promotion/Fall Prevention:   safety round/check completed   fall prevention program maintained  Taken 12/20/2024 2200 by Radha Burrows, RN  Safety Promotion/Fall Prevention:   safety round/check completed   fall prevention  program maintained  Taken 12/20/2024 2100 by Radha Burrows, RN  Safety Promotion/Fall Prevention:   safety round/check completed   fall prevention program maintained  Taken 12/20/2024 2000 by Radha Burrows, RN  Safety Promotion/Fall Prevention:   safety round/check completed   fall prevention program maintained  Taken 12/20/2024 1900 by Radha Burrows, RN  Safety Promotion/Fall Prevention:   safety round/check completed   fall prevention program maintained

## 2024-12-21 NOTE — PROGRESS NOTES
[facility] HOSPITALIST PROGRESS NOTE     Patient Identification:  Name:  Herminio Matson  Age:  53 y.o.  Sex:  male  :  1970  MRN:  [id number]  Visit Number:  [id number]  ROOM: [address]     Primary Care Provider:  Luma Pittman    Length of stay in inpatient status:  4    Subjective     Chief Compliant:    Chief Complaint   Patient presents with    Abdominal Pain       History of Presenting Illness:    Patient seen and examined this morning with his wife present at bedside. Patient reports his pain has significantly improved and he has not had any nausea since the early morning. His diet was advanced to clear liquid for breakfast and he reports having only a slight return of nausea after eating some jello. He also admits he ate some crackers. Denied shortness of breath or other new complaint.     Objective     Current Hospital Meds:[Held by provider] aspirin, 81 mg, Oral, Daily  [Held by provider] empagliflozin, 10 mg, Oral, Daily  famotidine, 20 mg, Oral, BID AC  fenofibrate, 145 mg, Oral, Daily  insulin lispro, 2-7 Units, Subcutaneous, 4x Daily AC & at Bedtime  mupirocin, 1 Application, Each Nare, BID  potassium chloride ER, 40 mEq, Oral, Q4H  sodium chloride, 10 mL, Intravenous, Q12H  sodium chloride, 1 g, Oral, TID With Meals         Current Antimicrobial Therapy:  Anti-Infectives (From admission, onward)      Ordered     Dose/Rate Route Frequency Start Stop    24  piperacillin-tazobactam (ZOSYN) IVPB 3.375 g IVPB in 100 mL NS (VTB)        Ordering Provider: Edgard Gong DO    3.375 g  over 30 Minutes Intravenous Once 24          Current Diuretic Therapy:  Diuretics (From admission, onward)      None          ----------------------------------------------------------------------------------------------------------------------  Vital Signs:  Temp:  [98 °F (36.7 °C)-100.5 °F (38.1 °C)] 98.1 °F (36.7 °C)  Heart Rate:  [101-118] 110  Resp:   [12-20] 20  BP: (118-151)/(72-95) 128/77  SpO2:  [93 %-100 %] 94 %  on   ;   Device (Oxygen Therapy): room air  Body mass index is 28.31 kg/m².    Wt Readings from Last 3 Encounters:   12/18/24 94.7 kg (208 lb 12.4 oz)   09/08/23 95.6 kg (210 lb 12.2 oz)     Intake & Output (last 3 days)         12/18 0701 12/19 0700 12/19 0701 12/20 0700 12/20 0701 12/21 0700 12/21 0701 12/22 0700    P.O. 610  500     I.V. (mL/kg) 1251.6 (13.2) 119.3 (1.3) 2056.1 (21.7)     Total Intake(mL/kg) 1861.6 (19.7) 119.3 (1.3) 2556.1 (27)     Urine (mL/kg/hr) 1850 (0.8) 1400 (0.6) 1500 (0.7)     Emesis/NG output        Stool 0 0      Total Output 1850 1400 1500     Net +11.6 -1280.8 +1056.1             Urine Unmeasured Occurrence  1 x 2 x     Stool Unmeasured Occurrence 0 x 1 x            Diet: Liquid, Gastrointestinal, Diabetic; Clear Liquid; Consistent Carbohydrate; Fat-Restricted; Fluid Consistency: Thin (IDDSI 0)  ----------------------------------------------------------------------------------------------------------------------  Physical exam:   Constitutional:  Well-developed and well-nourished. No acute distress.  Appeared comfortable and was ambulating without difficulty.  HENT:  Head:  Normocephalic and atraumatic.    Cardiovascular:  Mild tachycardia, regular rhythm   Pulmonary/Chest:  No respiratory distress, no wheezes, no crackles, no rhonchi  Abdominal: Mild to moderate distention, abdomen soft, mild tenderness in RUQ, tenderness improved in epigastric region  Musculoskeletal:  No deformity.    Neurological: Awake, alert, no focal deficit on gross examination. No slurred speech or facial droop. Mild right sided weakness consistent with reported history of bell's palsy.  Skin:  Skin is warm and dry.   Psychiatric: Appropriate mood and affect  Edited by: Alesha Gunn DO at 12/21/2024 1057  ----------------------------------------------------------------------------------------------------------------------  Results  "from last 7 days   Lab Units 12/21/24  1043 12/20/24  1559 12/20/24  0034 12/17/24  0541 12/16/24  1935   CRP mg/dL  --   --  27.50*  --   --    LACTATE mmol/L  --   --   --  2.4*  --    WBC 10*3/mm3 14.73* 11.75*  --  10.60  --    HEMOGLOBIN g/dL 12.2* 12.9*  --  18.7*  --    HEMATOCRIT % 37.3* 39.1  --  51.7*  --    MCV fL 92.1 92.4  --  89.9  --    MCHC g/dL 32.7 33.0  --  36.2*  --    PLATELETS 10*3/mm3 166 159  --  312  --    INR   --   --   --   --  0.97         Results from last 7 days   Lab Units 12/21/24  0415 12/20/24  0034 12/19/24  0105 12/18/24  2120 12/18/24  1010 12/17/24  0953 12/17/24  0541 12/16/24  2303   SODIUM mmol/L 131* 129* 126*   < > 125*   < > 131*  --    POTASSIUM mmol/L 3.6 4.3 4.4   < > 4.1   < > 4.7  --    MAGNESIUM mg/dL  --   --   --   --  1.8  --  2.1  --    CHLORIDE mmol/L 96* 96* 98   < > 95*   < > 100  --    CO2 mmol/L 23.4 22.7 16.2*   < > 18.3*   < > 20.4*  --    BUN mg/dL 8 12 21*   < > 31*   < > 25*  --    CREATININE mg/dL 0.58* 0.67* 0.73*   < > 1.02   < > 1.49*  --    CALCIUM mg/dL 8.5* 8.2* 7.6*   < > 7.4*   < > 7.2*  --    PHOSPHORUS mg/dL  --   --   --   --  2.2*  --  2.4* 3.1   GLUCOSE mg/dL 161* 166* 204*   < > 196*   < > 268*  --    ALBUMIN g/dL  --  2.4*  --   --   --   --  3.2*  --    BILIRUBIN mg/dL  --  0.5  --   --   --   --  0.5  --    ALK PHOS U/L  --  64  --   --   --   --  48  --    AST (SGOT) U/L  --  18  --   --   --   --  82*  --    ALT (SGPT) U/L  --  17  --   --   --   --  31  --     < > = values in this interval not displayed.   Estimated Creatinine Clearance: 171.8 mL/min (A) (by C-G formula based on SCr of 0.58 mg/dL (L)).  No results found for: \"AMMONIA\"          Results from last 7 days   Lab Units 12/21/24  0415 12/16/24  2215 12/16/24  1658   CHOLESTEROL mg/dL  --   --  1,185*   TRIGLYCERIDES mg/dL 393*   < > >4,425*   HDL CHOL mg/dL  --   --  17*    < > = values in this interval not displayed.     Glucose   Date/Time Value Ref Range Status " "  12/21/2024 0714 146 (H) 70 - 130 mg/dL Final   12/21/2024 0607 166 (H) 70 - 130 mg/dL Final   12/21/2024 0502 153 (H) 70 - 130 mg/dL Final   12/21/2024 0407 158 (H) 70 - 130 mg/dL Final   12/21/2024 0309 159 (H) 70 - 130 mg/dL Final   12/21/2024 0206 166 (H) 70 - 130 mg/dL Final   12/21/2024 0057 183 (H) 70 - 130 mg/dL Final   12/20/2024 2352 219 (H) 70 - 130 mg/dL Final     No results found for: \"TSH\", \"FREET4\"  No results found for: \"PREGTESTUR\", \"PREGSERUM\", \"HCG\", \"HCGQUANT\"  Pain Management Panel           No data to display              Brief Urine Lab Results  (Last result in the past 365 days)        Color   Clarity   Blood   Leuk Est   Nitrite   Protein   CREAT   Urine HCG        12/16/24 1655 Yellow   Clear   Negative   Negative   Negative   Trace                 Blood Culture   Date Value Ref Range Status   12/16/2024 No growth at 4 days  Preliminary   12/16/2024 No growth at 4 days  Preliminary     No results found for: \"URINECX\"  No results found for: \"WOUNDCX\"  No results found for: \"STOOLCX\"  No results found for: \"RESPCX\"  No results found for: \"AFBCX\"  Results from last 7 days   Lab Units 12/20/24  1559 12/20/24  0034 12/17/24  0541   PROCALCITONIN ng/mL 1.02*  --   --    LACTATE mmol/L  --   --  2.4*   CRP mg/dL  --  27.50*  --        I have personally looked at the labs and they are summarized above.  ----------------------------------------------------------------------------------------------------------------------  Detailed radiology reports for the last 24 hours:  Imaging Results (Last 24 Hours)       Procedure Component Value Units Date/Time    CT Abdomen Pelvis With Contrast [[id number]] Collected: 12/20/24 1121     Updated: 12/20/24 1125    Narrative:      EXAM: CT ABDOMEN PELVIS W CONTRAST-         TECHNIQUE: Multiple axial CT images were obtained from lung bases  through pubic symphysis WITH administration of IV contrast. Reformatted  images in the coronal and/or sagittal plane(s) were " generated from the  axial data set to facilitate diagnostic accuracy and/or surgical  planning.  Oral Contrast:NONE.     Radiation dose reduction techniques were utilized per ALARA protocol.  Automated exposure control was initiated through either or Xeebel or  Missing[employer] software packages by  protocol.    DOSE:     Clinical information abdominal pain/nausea, known pancreatitis,  worsening abdominal distention; K85.90-Acute pancreatitis without  necrosis or infection, unspecified      Comparison 12/16/2024     FINDINGS:     Lower thorax: Clear. No effusions.     Abdomen:     Liver: Homogeneous. No focal hepatic mass or ductal dilatation.     Gallbladder: No dilation or stone identified.     Pancreas: Extensive peripancreatic stranding and peripancreatic fluid  compatible with extensive pancreatitis, worse by imaging today than on  the prior study.     Spleen: Homogeneous. No splenomegaly.     Adrenals: No mass.     Kidneys/ureters: No mass. No obstructive uropathy.  No evidence of  urolithiasis.     GI tract: Marked thickening of the duodenum, presumably due to adjacent  pancreatitis.. There is no evidence of appendicitis     MESENTERY: Free fluid small amount     Vasculature: Evidence of atherosclerotic vascular disease     Abdominal wall: No focal hernia or mass.        Bladder: No focal mass or significant wall thickening     Reproductive: Unremarkable as visualized     Bones: No acute bony abnormality.       Impression:         1. Appearance suggestive of progression of pancreatitis with extensive  peripancreatic stranding and peripancreatic fluid. No abscess at this  time     2. Extensive thickening of the adjacent duodenum, presumably from the  pancreatitis                          This report was finalized on 12/20/2024 11:23 AM by Dr. Braden Landin MD.             Assessment & Plan      #Acute pancreatitis secondary to severe hypertriglyceridemia  #SIRS criteria due to  above  #Pseudohyponatremia, now resolved and with true hyponatremia  #Acute kidney injury  - CT was repeated 12/20/24 due to persistent abdominal pain and worsening abdominal distention. This showed increase in peripancreatic stranding and peripancreatic fluid with no abscess noted. This change was not entirely unexpected as it can take 72-96 hours for the evolution of the pancreatitis to manifest on imaging after onset of symptoms.   - Since no evidence of necrosis was seen on CT at admission or repeat scan there is currently no indication for antibiotics.   - Patient's symptoms have significantly improved but he has had very low grade fever this morning, so repeat inflammatory markers and CBC this morning.   - Patient's case was discussed with  at admission and patient not felt to need plasmapheresis. Recommendation was to treat with insulin drip and monitor serial triglyceride level. Triglyceride level has trended down from too high to read (>4,425) to 393. Since triglyceride level is now <500 I have discontinued the insulin drip and D10 infusion and restarted his home fenofibrate. Patient's home omega-3 fish oil is not available on formulary but would recommend patient either bringing from home or restarting as soon as he is discharged for ongoing management of hypertriglyceridemia.  - VS have remained stable  - creatinine returned to normal after IV fluids.   - Sodium level was 131 this morning. Since D10W infusion is being stopped I will also discontinue sodium chloride tablets. Repeat BMP in a.m.  - Patient reported his pain was much better and nausea had resolved this morning, so we will attempt to slowly advance his diet again today. Start fat-restricted, clear liquid diet. Consider advancing to full liquid by this evening if his symptoms remain well controlled.  - continue pain control with prn dilaudid. Continue antiemetics with zofran and compazine.    #Constipation  - PRN bowel regimen agents  available and he has received miralax, senna-docusate, and dulcolax since yesterday. He reported having a couple small bowel movements since yesterday.     #Type II DM  - insulin drip discontinued, so I have added sliding scale insulin and accuchecks. Hold home jardiance for now but if he is able to advance his PO intake then would plan to restart this.    # Bell's palsy  -Supportive care  Edited by: Alesha Gunn DO at 12/21/2024 1057    Active VTE Prophylaxis  Mechanical:        Start        12/17/24 0022  Maintain Sequential Compression Device  Continuous                            Dispo:  likely home at discharge pending clinical improvement     Alesha Gunn DO  [facility] Hospitalist  12/21/24  10:57 EST

## 2024-12-21 NOTE — PLAN OF CARE
Problem: Adult Inpatient Plan of Care  Goal: Plan of Care Review  Outcome: Progressing  Goal: Patient-Specific Goal (Individualized)  Outcome: Progressing  Goal: Absence of Hospital-Acquired Illness or Injury  Outcome: Progressing  Intervention: Identify and Manage Fall Risk  Recent Flowsheet Documentation  Taken 12/21/2024 1100 by Sheeba Helm RN  Safety Promotion/Fall Prevention: safety round/check completed  Taken 12/21/2024 1000 by Sheeba Helm RN  Safety Promotion/Fall Prevention: safety round/check completed  Taken 12/21/2024 0900 by Sheeba Helm RN  Safety Promotion/Fall Prevention: safety round/check completed  Taken 12/21/2024 0800 by Sheeba Helm RN  Safety Promotion/Fall Prevention: safety round/check completed  Taken 12/21/2024 0700 by Sheeba Helm RN  Safety Promotion/Fall Prevention: safety round/check completed  Intervention: Prevent Skin Injury  Recent Flowsheet Documentation  Taken 12/21/2024 1000 by Sheeba Helm RN  Body Position: (up in recliner) position changed independently  Taken 12/21/2024 0800 by Sheeba Helm RN  Body Position: (pt educated on need to turn q2h to prevent skin breakdown. pt verbalized understanding.) position changed independently  Goal: Optimal Comfort and Wellbeing  Outcome: Progressing  Intervention: Provide Person-Centered Care  Recent Flowsheet Documentation  Taken 12/21/2024 0800 by Sheeba Helm RN  Trust Relationship/Rapport:   care explained   choices provided   questions answered   questions encouraged   reassurance provided   thoughts/feelings acknowledged  Goal: Readiness for Transition of Care  Outcome: Progressing     Problem: Skin Injury Risk Increased  Goal: Skin Health and Integrity  Outcome: Progressing  Intervention: Optimize Skin Protection  Recent Flowsheet Documentation  Taken 12/21/2024 1000 by Sheeba Helm RN  Head of Bed (HOB) Positioning: (up in recliner) other (see comments)  Taken 12/21/2024 0800 by  Sheeba Helm RN  Activity Management: ambulated outside room  Head of Bed (HOB) Positioning: (sitting up in recliner) other (see comments)  Taken 12/21/2024 0700 by Sheeba Helm RN  Activity Management:   ambulated outside room   ambulated in room   ambulated to bathroom   up ad antoni     Problem: Sepsis/Septic Shock  Goal: Optimal Coping  Outcome: Progressing  Goal: Absence of Bleeding  Outcome: Progressing  Intervention: Monitor and Manage Bleeding  Recent Flowsheet Documentation  Taken 12/21/2024 0700 by Sheeba Helm RN  Bleeding Precautions: blood pressure closely monitored  Goal: Blood Glucose Level Within Target Range  Outcome: Progressing  Goal: Absence of Infection Signs and Symptoms  Outcome: Progressing  Intervention: Promote Stabilization  Recent Flowsheet Documentation  Taken 12/21/2024 0700 by Sheeba Helm RN  Fluid/Electrolyte Management: fluids provided  Intervention: Promote Recovery  Recent Flowsheet Documentation  Taken 12/21/2024 0800 by Sheeba Helm RN  Activity Management: ambulated outside room  Sleep/Rest Enhancement: awakenings minimized  Taken 12/21/2024 0700 by Sheeba Helm RN  Activity Management:   ambulated outside room   ambulated in room   ambulated to bathroom   up ad antoni  Goal: Optimal Nutrition Delivery  Outcome: Progressing     Problem: Fall Injury Risk  Goal: Absence of Fall and Fall-Related Injury  Outcome: Progressing  Intervention: Promote Injury-Free Environment  Recent Flowsheet Documentation  Taken 12/21/2024 1100 by Sheeba Helm RN  Safety Promotion/Fall Prevention: safety round/check completed  Taken 12/21/2024 1000 by Sheeba Helm RN  Safety Promotion/Fall Prevention: safety round/check completed  Taken 12/21/2024 0900 by Sheeba Helm RN  Safety Promotion/Fall Prevention: safety round/check completed  Taken 12/21/2024 0800 by Sheeba Helm RN  Safety Promotion/Fall Prevention: safety round/check completed  Taken 12/21/2024  0700 by Sheeba Helm, RN  Safety Promotion/Fall Prevention: safety round/check completed   Goal Outcome Evaluation:

## 2024-12-21 NOTE — PROGRESS NOTES
Nutrition Services    Patient Name:  Herminio Matson  YOB: 1970  MRN: [id number]  Admit Date:  12/16/2024    Day 5 Clears / NPO.  Noted 500 ml po recorded, D 10 kcal - 662 kcal.  Will start clear liquid supplement Boost breeze tid with meals.  Recommend if diet can't be advanced within next 48-72 hrs to consider alternate nutrition.  Will continue to follow.     Electronically signed by:  Kita Sanchez RD  12/21/24 10:47 EST

## 2024-12-22 VITALS
OXYGEN SATURATION: 99 % | HEART RATE: 103 BPM | HEIGHT: 72 IN | BODY MASS INDEX: 27.65 KG/M2 | TEMPERATURE: 98.5 F | SYSTOLIC BLOOD PRESSURE: 142 MMHG | WEIGHT: 204.15 LBS | RESPIRATION RATE: 20 BRPM | DIASTOLIC BLOOD PRESSURE: 74 MMHG

## 2024-12-22 LAB
ALBUMIN SERPL-MCNC: 2.5 G/DL (ref 3.5–5.2)
ALBUMIN/GLOB SERPL: 0.7 G/DL
ALP SERPL-CCNC: 82 U/L (ref 39–117)
ALT SERPL W P-5'-P-CCNC: 14 U/L (ref 1–41)
ANION GAP SERPL CALCULATED.3IONS-SCNC: 11.4 MMOL/L (ref 5–15)
ANISOCYTOSIS BLD QL: ABNORMAL
AST SERPL-CCNC: 15 U/L (ref 1–40)
BASOPHILS # BLD MANUAL: 0.15 10*3/MM3 (ref 0–0.2)
BASOPHILS NFR BLD MANUAL: 1 % (ref 0–1.5)
BILIRUB SERPL-MCNC: 0.5 MG/DL (ref 0–1.2)
BUN SERPL-MCNC: 11 MG/DL (ref 6–20)
BUN/CREAT SERPL: 18.3 (ref 7–25)
CALCIUM SPEC-SCNC: 8.6 MG/DL (ref 8.6–10.5)
CHLORIDE SERPL-SCNC: 97 MMOL/L (ref 98–107)
CO2 SERPL-SCNC: 25.6 MMOL/L (ref 22–29)
CREAT SERPL-MCNC: 0.6 MG/DL (ref 0.76–1.27)
DEPRECATED RDW RBC AUTO: 48.1 FL (ref 37–54)
EGFRCR SERPLBLD CKD-EPI 2021: 114 ML/MIN/1.73
EOSINOPHIL # BLD MANUAL: 0.3 10*3/MM3 (ref 0–0.4)
EOSINOPHIL NFR BLD MANUAL: 2 % (ref 0.3–6.2)
ERYTHROCYTE [DISTWIDTH] IN BLOOD BY AUTOMATED COUNT: 14 % (ref 12.3–15.4)
GLOBULIN UR ELPH-MCNC: 3.4 GM/DL
GLUCOSE BLDC GLUCOMTR-MCNC: 176 MG/DL (ref 70–130)
GLUCOSE BLDC GLUCOMTR-MCNC: 288 MG/DL (ref 70–130)
GLUCOSE SERPL-MCNC: 198 MG/DL (ref 65–99)
HCT VFR BLD AUTO: 35 % (ref 37.5–51)
HGB BLD-MCNC: 11.4 G/DL (ref 13–17.7)
LYMPHOCYTES # BLD MANUAL: 2.59 10*3/MM3 (ref 0.7–3.1)
LYMPHOCYTES NFR BLD MANUAL: 2 % (ref 5–12)
MCH RBC QN AUTO: 30.4 PG (ref 26.6–33)
MCHC RBC AUTO-ENTMCNC: 32.6 G/DL (ref 31.5–35.7)
MCV RBC AUTO: 93.3 FL (ref 79–97)
METAMYELOCYTES NFR BLD MANUAL: 2 % (ref 0–0)
MONOCYTES # BLD: 0.3 10*3/MM3 (ref 0.1–0.9)
MYELOCYTES NFR BLD MANUAL: 1 % (ref 0–0)
NEUTROPHILS # BLD AUTO: 11.42 10*3/MM3 (ref 1.7–7)
NEUTROPHILS NFR BLD MANUAL: 70 % (ref 42.7–76)
NEUTS BAND NFR BLD MANUAL: 5 % (ref 0–5)
PLAT MORPH BLD: NORMAL
PLATELET # BLD AUTO: 169 10*3/MM3 (ref 140–450)
PMV BLD AUTO: 10.4 FL (ref 6–12)
POTASSIUM SERPL-SCNC: 4 MMOL/L (ref 3.5–5.2)
PROT SERPL-MCNC: 5.9 G/DL (ref 6–8.5)
RBC # BLD AUTO: 3.75 10*6/MM3 (ref 4.14–5.8)
SODIUM SERPL-SCNC: 134 MMOL/L (ref 136–145)
VARIANT LYMPHS NFR BLD MANUAL: 17 % (ref 19.6–45.3)
WBC NRBC COR # BLD AUTO: 15.23 10*3/MM3 (ref 3.4–10.8)

## 2024-12-22 PROCEDURE — 85007 BL SMEAR W/DIFF WBC COUNT: CPT | Performed by: STUDENT IN AN ORGANIZED HEALTH CARE EDUCATION/TRAINING PROGRAM

## 2024-12-22 PROCEDURE — 80053 COMPREHEN METABOLIC PANEL: CPT | Performed by: STUDENT IN AN ORGANIZED HEALTH CARE EDUCATION/TRAINING PROGRAM

## 2024-12-22 PROCEDURE — 25010000002 PROCHLORPERAZINE 10 MG/2ML SOLUTION: Performed by: STUDENT IN AN ORGANIZED HEALTH CARE EDUCATION/TRAINING PROGRAM

## 2024-12-22 PROCEDURE — 63710000001 INSULIN LISPRO (HUMAN) PER 5 UNITS: Performed by: STUDENT IN AN ORGANIZED HEALTH CARE EDUCATION/TRAINING PROGRAM

## 2024-12-22 PROCEDURE — 85025 COMPLETE CBC W/AUTO DIFF WBC: CPT | Performed by: STUDENT IN AN ORGANIZED HEALTH CARE EDUCATION/TRAINING PROGRAM

## 2024-12-22 PROCEDURE — 82948 REAGENT STRIP/BLOOD GLUCOSE: CPT

## 2024-12-22 PROCEDURE — 25010000002 HYDROMORPHONE 1 MG/ML SOLUTION: Performed by: STUDENT IN AN ORGANIZED HEALTH CARE EDUCATION/TRAINING PROGRAM

## 2024-12-22 RX ORDER — METOPROLOL TARTRATE 25 MG/1
12.5 TABLET, FILM COATED ORAL EVERY 12 HOURS SCHEDULED
Status: DISCONTINUED | OUTPATIENT
Start: 2024-12-22 | End: 2024-12-22 | Stop reason: HOSPADM

## 2024-12-22 RX ORDER — PROCHLORPERAZINE MALEATE 5 MG/1
5 TABLET ORAL EVERY 6 HOURS PRN
Qty: 12 TABLET | Refills: 0 | Status: SHIPPED | OUTPATIENT
Start: 2024-12-22

## 2024-12-22 RX ORDER — HYDROCODONE BITARTRATE AND ACETAMINOPHEN 5; 325 MG/1; MG/1
1 TABLET ORAL EVERY 4 HOURS PRN
Qty: 18 TABLET | Refills: 0 | Status: SHIPPED | OUTPATIENT
Start: 2024-12-22 | End: 2024-12-25

## 2024-12-22 RX ORDER — METOPROLOL TARTRATE 25 MG/1
12.5 TABLET, FILM COATED ORAL EVERY 12 HOURS SCHEDULED
Qty: 30 TABLET | Refills: 0 | Status: SHIPPED | OUTPATIENT
Start: 2024-12-22 | End: 2025-01-21

## 2024-12-22 RX ADMIN — FENOFIBRATE 145 MG: 145 TABLET ORAL at 08:36

## 2024-12-22 RX ADMIN — CALCIUM CARBONATE (ANTACID) CHEW TAB 500 MG 2 TABLET: 500 CHEW TAB at 10:10

## 2024-12-22 RX ADMIN — Medication 10 ML: at 08:36

## 2024-12-22 RX ADMIN — Medication 1 G: at 08:35

## 2024-12-22 RX ADMIN — HYDROCODONE BITARTRATE AND ACETAMINOPHEN 1 TABLET: 5; 325 TABLET ORAL at 02:52

## 2024-12-22 RX ADMIN — Medication 1 G: at 11:08

## 2024-12-22 RX ADMIN — FAMOTIDINE 20 MG: 20 TABLET ORAL at 07:02

## 2024-12-22 RX ADMIN — INSULIN LISPRO 4 UNITS: 100 INJECTION, SOLUTION INTRAVENOUS; SUBCUTANEOUS at 11:07

## 2024-12-22 RX ADMIN — METOPROLOL TARTRATE 12.5 MG: 25 TABLET, FILM COATED ORAL at 14:38

## 2024-12-22 RX ADMIN — HYDROMORPHONE HYDROCHLORIDE 1 MG: 1 INJECTION, SOLUTION INTRAMUSCULAR; INTRAVENOUS; SUBCUTANEOUS at 11:08

## 2024-12-22 RX ADMIN — INSULIN LISPRO 2 UNITS: 100 INJECTION, SOLUTION INTRAVENOUS; SUBCUTANEOUS at 07:02

## 2024-12-22 RX ADMIN — PROCHLORPERAZINE EDISYLATE 5 MG: 5 INJECTION INTRAMUSCULAR; INTRAVENOUS at 02:52

## 2024-12-22 NOTE — PLAN OF CARE
Problem: Adult Inpatient Plan of Care  Goal: Plan of Care Review  Outcome: Progressing     Patient resting on RA. VSS. PRN pain and nausea medication administered. A&O x 4. Plan of care ongoing.

## 2024-12-22 NOTE — DISCHARGE SUMMARY
[facility] HOSPITALISTS DISCHARGE SUMMARY    Patient Identification:  Name:  Herminio Matson  Age:  53 y.o.  Sex:  male  :  1970  MRN:  [id number]  Visit Number:  [id number]    Date of Admission: 2024  Date of Discharge:  2024     PCP: Zain N    DISCHARGE DIAGNOSIS ***      CONSULTS ***      PROCEDURES PERFORMED  ***    HOSPITAL COURSE  Patient is a 53 y.o. male presented to [facility] complaining of ***.  Please see the admitting history and physical for further details.      Constitutional:  Well-developed and well-nourished. No acute distress.  Appeared comfortable and was ambulating without difficulty.  HENT:  Head:  Normocephalic and atraumatic.    Cardiovascular:  Mild tachycardia, regular rhythm   Pulmonary/Chest:  No respiratory distress, no wheezes, no crackles, no rhonchi  Abdominal: Mild to moderate distention, abdomen soft, mild tenderness in RUQ, tenderness improved in epigastric region  Musculoskeletal:  No deformity.    Neurological: Awake, alert, no focal deficit on gross examination. No slurred speech or facial droop. Mild right sided weakness consistent with reported history of bell's palsy.  Skin:  Skin is warm and dry.   Psychiatric: Appropriate mood and affect  Edited by: Alesha Gunn DO at 2024 1057    VITAL SIGNS:  Temp:  [97.7 °F (36.5 °C)-99.7 °F (37.6 °C)] 98.5 °F (36.9 °C)  Heart Rate:  [] 103  Resp:  [9-28] 18  BP: (109-153)/(62-82) 151/82  SpO2:  [94 %-99 %] 98 %  on   ;   Device (Oxygen Therapy): room air    Body mass index is 27.68 kg/m².  Wt Readings from Last 3 Encounters:   24 92.6 kg (204 lb 2.3 oz)   23 95.6 kg (210 lb 12.2 oz)       PHYSICAL EXAM: ***  Constitutional:  Well-developed and well-nourished.  No acute distress.      HENT:  Head:  Normocephalic and atraumatic.  Mouth:  Moist mucous membranes.    Eyes:  Conjunctivae and EOM are normal. No scleral icterus.    Neck:  Neck supple.  No  JVD present.    Cardiovascular:  Normal rate, regular rhythm, and normal heart sounds. No murmur.  Pulmonary/Chest:  Normal rate and effort. Breath sounds clear to auscultation bilaterally with good air movement.  Abdominal:  Soft. No distension and no tenderness. Normal bowel sounds present.  Musculoskeletal:  No tenderness and no deformity.  No red or swollen joints anywhere.    Neurological:  Alert and oriented to person, place, and time.  No focal strength or sensory deficit.    Skin:  Skin is warm and dry. No rash noted. No pallor.   Peripheral vascular:  No clubbing, no cyanosis, no edema.  DISCHARGE DISPOSITION   Stable    DISCHARGE MEDICATIONS:     Discharge Medications        New Medications        Instructions Start Date   HYDROcodone-acetaminophen 5-325 MG per tablet  Commonly known as: NORCO   1 tablet, Oral, Every 4 Hours PRN      metoprolol tartrate 25 MG tablet  Commonly known as: LOPRESSOR   12.5 mg, Oral, Every 12 Hours Scheduled      prochlorperazine 5 MG tablet  Commonly known as: COMPAZINE   5 mg, Oral, Every 6 Hours PRN             Continue These Medications        Instructions Start Date   aspirin 81 MG EC tablet   Take 1 tablet by mouth Daily.      empagliflozin 10 MG tablet tablet  Commonly known as: JARDIANCE   10 mg, Oral, Daily      fenofibrate 145 MG tablet  Commonly known as: TRICOR   145 mg, Oral, Nightly      fish oil 1000 MG capsule capsule   1,000 mg, Daily With Breakfast               Diet Instructions       Diet: Gastrointestinal Diets; Fat-Restricted; Regular (IDDSI 7); Thin (IDDSI 0)      Discharge Diet: Gastrointestinal Diets    Gastrointestinal Diet: Fat-Restricted    Texture: Regular (IDDSI 7)    Fluid Consistency: Thin (IDDSI 0)          Activity Instructions       Activity as Tolerated            Additional Instructions for the Follow-ups that You Need to Schedule       Discharge Follow-up with PCP   As directed       Currently Documented PCP:    Luma Pittman APRN    PCP  Phone Number:    [phone]     Follow Up Details: within 1 week               Follow-up Information       Zain April, APRN .    Specialty: Nurse Practitioner  Why: within 1 week  Contact information:  [address] [place] [address]  [phone]                              CODE STATUS  Code Status and Medical Interventions: CPR (Attempt to Resuscitate); Full Support   Ordered at: 12/16/24 2310     Code Status (Patient has no pulse and is not breathing):    CPR (Attempt to Resuscitate)     Medical Interventions (Patient has pulse or is breathing):    Full Support       Alesha Gunn DO  [facility]  12/22/24  14:59 EST    Please note that this discharge summary required more than 30 minutes to complete.

## 2024-12-23 ENCOUNTER — READMISSION MANAGEMENT (OUTPATIENT)
Dept: CALL CENTER | Facility: HOSPITAL | Age: 54
End: 2024-12-23
Payer: COMMERCIAL

## 2024-12-23 NOTE — OUTREACH NOTE
Prep Survey      Flowsheet Row Responses   [facility] facility patient discharged from? [facility]   Is LACE score < 7 ? No   Eligibility Readm Mgmt   Discharge diagnosis Acute pancreatitis   Does the patient have one of the following disease processes/diagnoses(primary or secondary)? Other   Does the patient have Home health ordered? No   Is there a DME ordered? No   Medication alerts for this patient see avs   Prep survey completed? Yes            Nerissa HELM - Registered Nurse

## 2024-12-23 NOTE — PAYOR COMM NOTE
"[facility]  NPI:[id number]    Utilization Review  Contact: Farhana Darnell RN  Phone: [phone]  Fax:[phone]    DISCHARGE NOTIFICATION     Auth # [id number]    Kylie Matson (53 y.o. Male)       Date of Birth   1970    Social Security Number       Address   [address] [place] [address]    Home Phone   [phone]    MRN   [id number]       Evangelical   Non-Tenriism    Marital Status                               Admission Date   12/16/24    Admission Type   Emergency    Admitting Provider   Paolo Valentine MD    Attending Provider       Department, Room/Bed   [facility] CRITICAL CARE, CC01/1C       Discharge Date   12/22/2024    Discharge Disposition   Home or Self Care    Discharge Destination                                 Attending Provider: (none)   Allergies: No Known Allergies    Isolation: None   Infection: None   Code Status: Prior    Ht: 182.9 cm (72.01\")   Wt: 92.6 kg (204 lb 2.3 oz)    Admission Cmt: None   Principal Problem: Acute pancreatitis [K85.90]                   Active Insurance as of 12/16/2024       Primary Coverage       Payor Plan Insurance Group Employer/Plan Group    [employer]UE [employer]OSS [employer]UE [employer] PPO [id number]       Payor Plan Address Payor Plan Phone Number Payor Plan Fax Number Effective Dates    [address] [phone]  1/1/2023 - None Entered    [place] [address]         Subscriber Name Subscriber Birth Date Member ID       KYLIE MATSON 1970 [id number]                     Emergency Contacts        (Rel.) Home Phone Work Phone Mobile Phone    NIKKI MATSON (Spouse) [phone] -- --          Alesha Gunn DO   Physician  Medicine     Discharge Summary     Incomplete     Date of Service: 12/22/24 1459  Creation Time: 12/22/24 1459     Incomplete              [facility]     Patient Identification:  Name:  " Herminio Matson  Age:  53 y.o.  Sex:  male  :  1970  MRN:  [id number]  Visit Number:  [id number]     Date of Admission: 2024  Date of Discharge:  2024      PCP: Luma Pittman APRN     DISCHARGE DIAGNOSIS         CONSULTS         PROCEDURES PERFORMED       HOSPITAL COURSE  Patient is a 53 y.o. male presented to [facility] complaining of .  Please see the admitting history and physical for further details.       Constitutional:  Well-developed and well-nourished. No acute distress.  Appeared comfortable and was ambulating without difficulty.  HENT:  Head:  Normocephalic and atraumatic.    Cardiovascular:  Mild tachycardia, regular rhythm   Pulmonary/Chest:  No respiratory distress, no wheezes, no crackles, no rhonchi  Abdominal: Mild to moderate distention, abdomen soft, mild tenderness in RUQ, tenderness improved in epigastric region  Musculoskeletal:  No deformity.    Neurological: Awake, alert, no focal deficit on gross examination. No slurred speech or facial droop. Mild right sided weakness consistent with reported history of bell's palsy.  Skin:  Skin is warm and dry.   Psychiatric: Appropriate mood and affect  Edited by: Alesha Gunn DO at 2024 1057     VITAL SIGNS:  Temp:  [97.7 °F (36.5 °C)-99.7 °F (37.6 °C)] 98.5 °F (36.9 °C)  Heart Rate:  [] 103  Resp:  [9-28] 18  BP: (109-153)/(62-82) 151/82  SpO2:  [94 %-99 %] 98 %  on   ;   Device (Oxygen Therapy): room air     Body mass index is 27.68 kg/m².      Wt Readings from Last 3 Encounters:   24 92.6 kg (204 lb 2.3 oz)   23 95.6 kg (210 lb 12.2 oz)         PHYSICAL EXAM:   Constitutional:  Well-developed and well-nourished.  No acute distress.      HENT:  Head:  Normocephalic and atraumatic.  Mouth:  Moist mucous membranes.    Eyes:  Conjunctivae and EOM are normal. No scleral icterus.    Neck:  Neck supple.  No JVD present.    Cardiovascular:  Normal rate, regular rhythm, and normal heart sounds. No  murmur.  Pulmonary/Chest:  Normal rate and effort. Breath sounds clear to auscultation bilaterally with good air movement.  Abdominal:  Soft. No distension and no tenderness. Normal bowel sounds present.  Musculoskeletal:  No tenderness and no deformity.  No red or swollen joints anywhere.    Neurological:  Alert and oriented to person, place, and time.  No focal strength or sensory deficit.    Skin:  Skin is warm and dry. No rash noted. No pallor.   Peripheral vascular:  No clubbing, no cyanosis, no edema.  DISCHARGE DISPOSITION   Stable     DISCHARGE MEDICATIONS:      Discharge Medications          New Medications         Instructions Start Date   HYDROcodone-acetaminophen 5-325 MG per tablet  Commonly known as: NORCO    1 tablet, Oral, Every 4 Hours PRN        metoprolol tartrate 25 MG tablet  Commonly known as: LOPRESSOR    12.5 mg, Oral, Every 12 Hours Scheduled        prochlorperazine 5 MG tablet  Commonly known as: COMPAZINE    5 mg, Oral, Every 6 Hours PRN                  Continue These Medications         Instructions Start Date   aspirin 81 MG EC tablet    Take 1 tablet by mouth Daily.        empagliflozin 10 MG tablet tablet  Commonly known as: JARDIANCE    10 mg, Oral, Daily        fenofibrate 145 MG tablet  Commonly known as: TRICOR    145 mg, Oral, Nightly        fish oil 1000 MG capsule capsule    1,000 mg, Daily With Breakfast                     Diet Instructions         Diet: Gastrointestinal Diets; Fat-Restricted; Regular (IDDSI 7); Thin (IDDSI 0)       Discharge Diet: Gastrointestinal Diets     Gastrointestinal Diet: Fat-Restricted     Texture: Regular (IDDSI 7)     Fluid Consistency: Thin (IDDSI 0)             Activity Instructions         Activity as Tolerated               Additional Instructions for the Follow-ups that You Need to Schedule         Discharge Follow-up with PCP   As directed         Currently Documented PCP:    Zain April, APRN    PCP Phone Number:    [phone]       Follow Up Details: within 1 week                     Follow-up Information         Zain April, APRN .    Specialty: Nurse Practitioner  Why: within 1 week  Contact information:  [address] [place] [address] [phone]                                      CODE STATUS      Code Status and Medical Interventions: CPR (Attempt to Resuscitate); Full Support   Ordered at: 12/16/24 2320     Code Status (Patient has no pulse and is not breathing):     CPR (Attempt to Resuscitate)     Medical Interventions (Patient has pulse or is breathing):     Full Support         Alesha Gunn DO  [facility]  12/22/24  14:59 EST     Please note that this discharge summary required more than 30 minutes to complete.

## 2024-12-25 ENCOUNTER — APPOINTMENT (OUTPATIENT)
Dept: GENERAL RADIOLOGY | Facility: HOSPITAL | Age: 54
End: 2024-12-25
Payer: COMMERCIAL

## 2024-12-25 PROCEDURE — 93005 ELECTROCARDIOGRAM TRACING: CPT | Performed by: STUDENT IN AN ORGANIZED HEALTH CARE EDUCATION/TRAINING PROGRAM

## 2024-12-25 PROCEDURE — 84484 ASSAY OF TROPONIN QUANT: CPT | Performed by: STUDENT IN AN ORGANIZED HEALTH CARE EDUCATION/TRAINING PROGRAM

## 2024-12-25 PROCEDURE — 87040 BLOOD CULTURE FOR BACTERIA: CPT | Performed by: STUDENT IN AN ORGANIZED HEALTH CARE EDUCATION/TRAINING PROGRAM

## 2024-12-25 PROCEDURE — 85025 COMPLETE CBC W/AUTO DIFF WBC: CPT | Performed by: STUDENT IN AN ORGANIZED HEALTH CARE EDUCATION/TRAINING PROGRAM

## 2024-12-25 PROCEDURE — 36415 COLL VENOUS BLD VENIPUNCTURE: CPT

## 2024-12-25 PROCEDURE — 99285 EMERGENCY DEPT VISIT HI MDM: CPT

## 2024-12-25 PROCEDURE — 83880 ASSAY OF NATRIURETIC PEPTIDE: CPT | Performed by: STUDENT IN AN ORGANIZED HEALTH CARE EDUCATION/TRAINING PROGRAM

## 2024-12-25 PROCEDURE — 80053 COMPREHEN METABOLIC PANEL: CPT | Performed by: STUDENT IN AN ORGANIZED HEALTH CARE EDUCATION/TRAINING PROGRAM

## 2024-12-25 PROCEDURE — 82009 KETONE BODYS QUAL: CPT | Performed by: STUDENT IN AN ORGANIZED HEALTH CARE EDUCATION/TRAINING PROGRAM

## 2024-12-25 PROCEDURE — 71045 X-RAY EXAM CHEST 1 VIEW: CPT

## 2024-12-25 PROCEDURE — 83605 ASSAY OF LACTIC ACID: CPT | Performed by: STUDENT IN AN ORGANIZED HEALTH CARE EDUCATION/TRAINING PROGRAM

## 2024-12-25 PROCEDURE — 85007 BL SMEAR W/DIFF WBC COUNT: CPT | Performed by: STUDENT IN AN ORGANIZED HEALTH CARE EDUCATION/TRAINING PROGRAM

## 2024-12-25 RX ORDER — SODIUM CHLORIDE 0.9 % (FLUSH) 0.9 %
10 SYRINGE (ML) INJECTION AS NEEDED
Status: DISCONTINUED | OUTPATIENT
Start: 2024-12-25 | End: 2024-12-26 | Stop reason: HOSPADM

## 2024-12-26 ENCOUNTER — HOSPITAL ENCOUNTER (EMERGENCY)
Facility: HOSPITAL | Age: 54
Discharge: OTHER INSTITUTION | End: 2024-12-26
Attending: STUDENT IN AN ORGANIZED HEALTH CARE EDUCATION/TRAINING PROGRAM
Payer: COMMERCIAL

## 2024-12-26 ENCOUNTER — APPOINTMENT (OUTPATIENT)
Dept: CT IMAGING | Facility: HOSPITAL | Age: 54
End: 2024-12-26
Payer: COMMERCIAL

## 2024-12-26 VITALS
BODY MASS INDEX: 27.77 KG/M2 | SYSTOLIC BLOOD PRESSURE: 140 MMHG | WEIGHT: 205 LBS | DIASTOLIC BLOOD PRESSURE: 72 MMHG | TEMPERATURE: 98.4 F | OXYGEN SATURATION: 100 % | HEIGHT: 72 IN | RESPIRATION RATE: 21 BRPM | HEART RATE: 113 BPM

## 2024-12-26 DIAGNOSIS — E13.10 DIABETIC KETOACIDOSIS WITHOUT COMA ASSOCIATED WITH OTHER SPECIFIED DIABETES MELLITUS: ICD-10-CM

## 2024-12-26 DIAGNOSIS — K86.89 PANCREATIC NECROSIS: Primary | ICD-10-CM

## 2024-12-26 LAB
A-A DO2: 12 MMHG (ref 0–300)
ACETONE BLD QL: ABNORMAL
ALBUMIN SERPL-MCNC: 2.9 G/DL (ref 3.5–5.2)
ALBUMIN SERPL-MCNC: 2.9 G/DL (ref 3.5–5.2)
ALBUMIN/GLOB SERPL: 0.7 G/DL
ALBUMIN/GLOB SERPL: 0.7 G/DL
ALP SERPL-CCNC: 106 U/L (ref 39–117)
ALP SERPL-CCNC: 108 U/L (ref 39–117)
ALT SERPL W P-5'-P-CCNC: 11 U/L (ref 1–41)
ALT SERPL W P-5'-P-CCNC: 13 U/L (ref 1–41)
ANION GAP SERPL CALCULATED.3IONS-SCNC: 31.4 MMOL/L (ref 5–15)
ANION GAP SERPL CALCULATED.3IONS-SCNC: 32 MMOL/L (ref 5–15)
ANION GAP SERPL CALCULATED.3IONS-SCNC: 32.3 MMOL/L (ref 5–15)
ANISOCYTOSIS BLD QL: ABNORMAL
ARTERIAL PATENCY WRIST A: ABNORMAL
AST SERPL-CCNC: 13 U/L (ref 1–40)
AST SERPL-CCNC: 15 U/L (ref 1–40)
ATMOSPHERIC PRESS: 733 MMHG
BASE EXCESS BLDA CALC-SCNC: -20.2 MMOL/L (ref 0–2)
BDY SITE: ABNORMAL
BILIRUB SERPL-MCNC: 0.3 MG/DL (ref 0–1.2)
BILIRUB SERPL-MCNC: 0.3 MG/DL (ref 0–1.2)
BUN SERPL-MCNC: 22 MG/DL (ref 6–20)
BUN SERPL-MCNC: 24 MG/DL (ref 6–20)
BUN SERPL-MCNC: 25 MG/DL (ref 6–20)
BUN/CREAT SERPL: 22 (ref 7–25)
BUN/CREAT SERPL: 22.9 (ref 7–25)
BUN/CREAT SERPL: 24 (ref 7–25)
CALCIUM SPEC-SCNC: 9.4 MG/DL (ref 8.6–10.5)
CALCIUM SPEC-SCNC: 9.6 MG/DL (ref 8.6–10.5)
CALCIUM SPEC-SCNC: 9.7 MG/DL (ref 8.6–10.5)
CHLORIDE SERPL-SCNC: 95 MMOL/L (ref 98–107)
CHLORIDE SERPL-SCNC: 97 MMOL/L (ref 98–107)
CHLORIDE SERPL-SCNC: 97 MMOL/L (ref 98–107)
CHOLEST SERPL-MCNC: 357 MG/DL (ref 0–200)
CO2 BLDA-SCNC: 5.7 MMOL/L (ref 22–33)
CO2 SERPL-SCNC: 6 MMOL/L (ref 22–29)
CO2 SERPL-SCNC: 6.7 MMOL/L (ref 22–29)
CO2 SERPL-SCNC: 7.6 MMOL/L (ref 22–29)
COHGB MFR BLD: 1.1 % (ref 0–5)
CREAT SERPL-MCNC: 1 MG/DL (ref 0.76–1.27)
CREAT SERPL-MCNC: 1.04 MG/DL (ref 0.76–1.27)
CREAT SERPL-MCNC: 1.05 MG/DL (ref 0.76–1.27)
D-LACTATE SERPL-SCNC: 1.9 MMOL/L (ref 0.5–2)
DEPRECATED RDW RBC AUTO: 49.2 FL (ref 37–54)
DEPRECATED RDW RBC AUTO: 52.5 FL (ref 37–54)
EGFRCR SERPLBLD CKD-EPI 2021: 83.8 ML/MIN/1.73
EGFRCR SERPLBLD CKD-EPI 2021: 84.8 ML/MIN/1.73
EGFRCR SERPLBLD CKD-EPI 2021: 88.9 ML/MIN/1.73
ERYTHROCYTE [DISTWIDTH] IN BLOOD BY AUTOMATED COUNT: 13.4 % (ref 12.3–15.4)
ERYTHROCYTE [DISTWIDTH] IN BLOOD BY AUTOMATED COUNT: 13.6 % (ref 12.3–15.4)
FLUAV RNA RESP QL NAA+PROBE: NOT DETECTED
FLUBV RNA RESP QL NAA+PROBE: NOT DETECTED
GEN 5 1HR TROPONIN T REFLEX: 9 NG/L
GLOBULIN UR ELPH-MCNC: 4 GM/DL
GLOBULIN UR ELPH-MCNC: 4.3 GM/DL
GLUCOSE BLDC GLUCOMTR-MCNC: 169 MG/DL (ref 70–130)
GLUCOSE SERPL-MCNC: 174 MG/DL (ref 65–99)
GLUCOSE SERPL-MCNC: 179 MG/DL (ref 65–99)
GLUCOSE SERPL-MCNC: 181 MG/DL (ref 65–99)
HBA1C MFR BLD: 9.8 % (ref 4.8–5.6)
HCO3 BLDA-SCNC: 5.3 MMOL/L (ref 20–26)
HCT VFR BLD AUTO: 48.3 % (ref 37.5–51)
HCT VFR BLD AUTO: 49.9 % (ref 37.5–51)
HCT VFR BLD CALC: 41.3 % (ref 38–51)
HDLC SERPL-MCNC: 20 MG/DL (ref 40–60)
HGB BLD-MCNC: 14.8 G/DL (ref 13–17.7)
HGB BLD-MCNC: 14.9 G/DL (ref 13–17.7)
HGB BLDA-MCNC: 13.5 G/DL (ref 14–18)
HOLD SPECIMEN: NORMAL
HOLD SPECIMEN: NORMAL
HYPOCHROMIA BLD QL: ABNORMAL
HYPOCHROMIA BLD QL: ABNORMAL
INHALED O2 CONCENTRATION: 21 %
LDLC SERPL CALC-MCNC: 250 MG/DL (ref 0–100)
LDLC/HDLC SERPL: 12.99 {RATIO}
LIPASE SERPL-CCNC: 41 U/L (ref 13–60)
LYMPHOCYTES # BLD MANUAL: 0.46 10*3/MM3 (ref 0.7–3.1)
LYMPHOCYTES # BLD MANUAL: 3.57 10*3/MM3 (ref 0.7–3.1)
LYMPHOCYTES NFR BLD MANUAL: 2 % (ref 5–12)
LYMPHOCYTES NFR BLD MANUAL: 5 % (ref 5–12)
Lab: ABNORMAL
Lab: ABNORMAL
MACROCYTES BLD QL SMEAR: ABNORMAL
MACROCYTES BLD QL SMEAR: ABNORMAL
MAGNESIUM SERPL-MCNC: 2.5 MG/DL (ref 1.6–2.6)
MCH RBC QN AUTO: 30.3 PG (ref 26.6–33)
MCH RBC QN AUTO: 30.9 PG (ref 26.6–33)
MCHC RBC AUTO-ENTMCNC: 29.7 G/DL (ref 31.5–35.7)
MCHC RBC AUTO-ENTMCNC: 30.8 G/DL (ref 31.5–35.7)
MCV RBC AUTO: 104.2 FL (ref 79–97)
MCV RBC AUTO: 98.2 FL (ref 79–97)
METAMYELOCYTES NFR BLD MANUAL: 1 % (ref 0–0)
METHGB BLD QL: 0.9 % (ref 0–3)
MODALITY: ABNORMAL
MONOCYTES # BLD: 0.92 10*3/MM3 (ref 0.1–0.9)
MONOCYTES # BLD: 1.78 10*3/MM3 (ref 0.1–0.9)
MYELOCYTES NFR BLD MANUAL: 2 % (ref 0–0)
MYELOCYTES NFR BLD MANUAL: 3 % (ref 0–0)
NEUTROPHILS # BLD AUTO: 29.23 10*3/MM3 (ref 1.7–7)
NEUTROPHILS # BLD AUTO: 43.16 10*3/MM3 (ref 1.7–7)
NEUTROPHILS NFR BLD MANUAL: 77 % (ref 42.7–76)
NEUTROPHILS NFR BLD MANUAL: 90 % (ref 42.7–76)
NEUTS BAND NFR BLD MANUAL: 4 % (ref 0–5)
NEUTS BAND NFR BLD MANUAL: 5 % (ref 0–5)
NOTIFIED BY: ABNORMAL
NOTIFIED WHO: ABNORMAL
NT-PROBNP SERPL-MCNC: 118.7 PG/ML (ref 0–900)
OSMOLALITY SERPL: 319 MOSM/KG (ref 275–300)
OXYHGB MFR BLDV: 95.9 % (ref 94–99)
PCO2 BLDA: 13.4 MM HG (ref 35–45)
PCO2 TEMP ADJ BLD: ABNORMAL MM[HG]
PH BLDA: 7.2 PH UNITS (ref 7.35–7.45)
PH, TEMP CORRECTED: ABNORMAL
PHOSPHATE SERPL-MCNC: 4.1 MG/DL (ref 2.5–4.5)
PLAT MORPH BLD: NORMAL
PLAT MORPH BLD: NORMAL
PLATELET # BLD AUTO: 454 10*3/MM3 (ref 140–450)
PLATELET # BLD AUTO: 519 10*3/MM3 (ref 140–450)
PMV BLD AUTO: 9.5 FL (ref 6–12)
PMV BLD AUTO: 9.6 FL (ref 6–12)
PO2 BLDA: 117 MM HG (ref 83–108)
PO2 TEMP ADJ BLD: ABNORMAL MM[HG]
POLYCHROMASIA BLD QL SMEAR: ABNORMAL
POTASSIUM SERPL-SCNC: 4.8 MMOL/L (ref 3.5–5.2)
POTASSIUM SERPL-SCNC: 5.1 MMOL/L (ref 3.5–5.2)
POTASSIUM SERPL-SCNC: 5.1 MMOL/L (ref 3.5–5.2)
PROT SERPL-MCNC: 6.9 G/DL (ref 6–8.5)
PROT SERPL-MCNC: 7.2 G/DL (ref 6–8.5)
RBC # BLD AUTO: 4.79 10*6/MM3 (ref 4.14–5.8)
RBC # BLD AUTO: 4.92 10*6/MM3 (ref 4.14–5.8)
SAO2 % BLDCOA: 97.9 % (ref 94–99)
SARS-COV-2 RNA RESP QL NAA+PROBE: NOT DETECTED
SODIUM SERPL-SCNC: 134 MMOL/L (ref 136–145)
SODIUM SERPL-SCNC: 135 MMOL/L (ref 136–145)
SODIUM SERPL-SCNC: 136 MMOL/L (ref 136–145)
TRIGL SERPL-MCNC: 386 MG/DL (ref 0–150)
TROPONIN T NUMERIC DELTA: -1 NG/L
TROPONIN T SERPL HS-MCNC: 10 NG/L
VARIANT LYMPHS NFR BLD MANUAL: 1 % (ref 19.6–45.3)
VARIANT LYMPHS NFR BLD MANUAL: 10 % (ref 19.6–45.3)
VENTILATOR MODE: ABNORMAL
VLDLC SERPL-MCNC: 87 MG/DL (ref 5–40)
WBC NRBC COR # BLD AUTO: 35.65 10*3/MM3 (ref 3.4–10.8)
WBC NRBC COR # BLD AUTO: 45.91 10*3/MM3 (ref 3.4–10.8)
WHOLE BLOOD HOLD COAG: NORMAL
WHOLE BLOOD HOLD SPECIMEN: NORMAL

## 2024-12-26 PROCEDURE — 83050 HGB METHEMOGLOBIN QUAN: CPT

## 2024-12-26 PROCEDURE — 25510000001 IOPAMIDOL 61 % SOLUTION: Performed by: STUDENT IN AN ORGANIZED HEALTH CARE EDUCATION/TRAINING PROGRAM

## 2024-12-26 PROCEDURE — 83735 ASSAY OF MAGNESIUM: CPT | Performed by: STUDENT IN AN ORGANIZED HEALTH CARE EDUCATION/TRAINING PROGRAM

## 2024-12-26 PROCEDURE — 96365 THER/PROPH/DIAG IV INF INIT: CPT

## 2024-12-26 PROCEDURE — 71260 CT THORAX DX C+: CPT

## 2024-12-26 PROCEDURE — 85025 COMPLETE CBC W/AUTO DIFF WBC: CPT | Performed by: STUDENT IN AN ORGANIZED HEALTH CARE EDUCATION/TRAINING PROGRAM

## 2024-12-26 PROCEDURE — 96375 TX/PRO/DX INJ NEW DRUG ADDON: CPT

## 2024-12-26 PROCEDURE — 74177 CT ABD & PELVIS W/CONTRAST: CPT

## 2024-12-26 PROCEDURE — 85007 BL SMEAR W/DIFF WBC COUNT: CPT | Performed by: STUDENT IN AN ORGANIZED HEALTH CARE EDUCATION/TRAINING PROGRAM

## 2024-12-26 PROCEDURE — 25810000003 SODIUM CHLORIDE 0.9 % SOLUTION: Performed by: STUDENT IN AN ORGANIZED HEALTH CARE EDUCATION/TRAINING PROGRAM

## 2024-12-26 PROCEDURE — 96361 HYDRATE IV INFUSION ADD-ON: CPT

## 2024-12-26 PROCEDURE — 36600 WITHDRAWAL OF ARTERIAL BLOOD: CPT

## 2024-12-26 PROCEDURE — 25010000002 PIPERACILLIN SOD-TAZOBACTAM PER 1 G: Performed by: STUDENT IN AN ORGANIZED HEALTH CARE EDUCATION/TRAINING PROGRAM

## 2024-12-26 PROCEDURE — 82948 REAGENT STRIP/BLOOD GLUCOSE: CPT

## 2024-12-26 PROCEDURE — 25010000002 VANCOMYCIN 5 G RECONSTITUTED SOLUTION: Performed by: STUDENT IN AN ORGANIZED HEALTH CARE EDUCATION/TRAINING PROGRAM

## 2024-12-26 PROCEDURE — 83036 HEMOGLOBIN GLYCOSYLATED A1C: CPT | Performed by: STUDENT IN AN ORGANIZED HEALTH CARE EDUCATION/TRAINING PROGRAM

## 2024-12-26 PROCEDURE — 25010000002 MORPHINE PER 10 MG: Performed by: STUDENT IN AN ORGANIZED HEALTH CARE EDUCATION/TRAINING PROGRAM

## 2024-12-26 PROCEDURE — 83690 ASSAY OF LIPASE: CPT | Performed by: STUDENT IN AN ORGANIZED HEALTH CARE EDUCATION/TRAINING PROGRAM

## 2024-12-26 PROCEDURE — 80061 LIPID PANEL: CPT | Performed by: STUDENT IN AN ORGANIZED HEALTH CARE EDUCATION/TRAINING PROGRAM

## 2024-12-26 PROCEDURE — 83930 ASSAY OF BLOOD OSMOLALITY: CPT | Performed by: STUDENT IN AN ORGANIZED HEALTH CARE EDUCATION/TRAINING PROGRAM

## 2024-12-26 PROCEDURE — 80053 COMPREHEN METABOLIC PANEL: CPT | Performed by: STUDENT IN AN ORGANIZED HEALTH CARE EDUCATION/TRAINING PROGRAM

## 2024-12-26 PROCEDURE — 84100 ASSAY OF PHOSPHORUS: CPT | Performed by: STUDENT IN AN ORGANIZED HEALTH CARE EDUCATION/TRAINING PROGRAM

## 2024-12-26 PROCEDURE — 82375 ASSAY CARBOXYHB QUANT: CPT

## 2024-12-26 PROCEDURE — 25810000003 LACTATED RINGERS PER 1000 ML: Performed by: STUDENT IN AN ORGANIZED HEALTH CARE EDUCATION/TRAINING PROGRAM

## 2024-12-26 PROCEDURE — 96367 TX/PROPH/DG ADDL SEQ IV INF: CPT

## 2024-12-26 PROCEDURE — 82805 BLOOD GASES W/O2 SATURATION: CPT

## 2024-12-26 PROCEDURE — 71045 X-RAY EXAM CHEST 1 VIEW: CPT | Performed by: RADIOLOGY

## 2024-12-26 PROCEDURE — 84484 ASSAY OF TROPONIN QUANT: CPT | Performed by: STUDENT IN AN ORGANIZED HEALTH CARE EDUCATION/TRAINING PROGRAM

## 2024-12-26 PROCEDURE — 87636 SARSCOV2 & INF A&B AMP PRB: CPT | Performed by: NURSE PRACTITIONER

## 2024-12-26 RX ORDER — SODIUM CHLORIDE AND POTASSIUM CHLORIDE 150; 450 MG/100ML; MG/100ML
200 INJECTION, SOLUTION INTRAVENOUS CONTINUOUS PRN
Status: DISCONTINUED | OUTPATIENT
Start: 2024-12-26 | End: 2024-12-26 | Stop reason: HOSPADM

## 2024-12-26 RX ORDER — SODIUM CHLORIDE 9 MG/ML
40 INJECTION, SOLUTION INTRAVENOUS AS NEEDED
Status: DISCONTINUED | OUTPATIENT
Start: 2024-12-26 | End: 2024-12-26 | Stop reason: HOSPADM

## 2024-12-26 RX ORDER — SODIUM CHLORIDE AND POTASSIUM CHLORIDE 300; 900 MG/100ML; MG/100ML
200 INJECTION, SOLUTION INTRAVENOUS CONTINUOUS PRN
Status: DISCONTINUED | OUTPATIENT
Start: 2024-12-26 | End: 2024-12-26 | Stop reason: HOSPADM

## 2024-12-26 RX ORDER — DEXTROSE MONOHYDRATE 25 G/50ML
10-50 INJECTION, SOLUTION INTRAVENOUS
Status: DISCONTINUED | OUTPATIENT
Start: 2024-12-26 | End: 2024-12-26 | Stop reason: HOSPADM

## 2024-12-26 RX ORDER — SODIUM CHLORIDE AND POTASSIUM CHLORIDE 150; 900 MG/100ML; MG/100ML
200 INJECTION, SOLUTION INTRAVENOUS CONTINUOUS PRN
Status: DISCONTINUED | OUTPATIENT
Start: 2024-12-26 | End: 2024-12-26 | Stop reason: HOSPADM

## 2024-12-26 RX ORDER — DEXTROSE MONOHYDRATE AND SODIUM CHLORIDE 5; .45 G/100ML; G/100ML
125 INJECTION, SOLUTION INTRAVENOUS CONTINUOUS PRN
Status: DISCONTINUED | OUTPATIENT
Start: 2024-12-26 | End: 2024-12-26 | Stop reason: HOSPADM

## 2024-12-26 RX ORDER — DEXTROSE MONOHYDRATE, SODIUM CHLORIDE, AND POTASSIUM CHLORIDE 50; 2.98; 4.5 G/1000ML; G/1000ML; G/1000ML
125 INJECTION, SOLUTION INTRAVENOUS CONTINUOUS PRN
Status: DISCONTINUED | OUTPATIENT
Start: 2024-12-26 | End: 2024-12-26 | Stop reason: HOSPADM

## 2024-12-26 RX ORDER — DEXTROSE MONOHYDRATE, SODIUM CHLORIDE, AND POTASSIUM CHLORIDE 50; 1.49; 4.5 G/1000ML; G/1000ML; G/1000ML
125 INJECTION, SOLUTION INTRAVENOUS CONTINUOUS PRN
Status: DISCONTINUED | OUTPATIENT
Start: 2024-12-26 | End: 2024-12-26 | Stop reason: HOSPADM

## 2024-12-26 RX ORDER — SODIUM CHLORIDE 0.9 % (FLUSH) 0.9 %
10 SYRINGE (ML) INJECTION AS NEEDED
Status: DISCONTINUED | OUTPATIENT
Start: 2024-12-26 | End: 2024-12-26 | Stop reason: HOSPADM

## 2024-12-26 RX ORDER — DEXTROSE MONOHYDRATE AND SODIUM CHLORIDE 5; .9 G/100ML; G/100ML
125 INJECTION, SOLUTION INTRAVENOUS CONTINUOUS PRN
Status: DISCONTINUED | OUTPATIENT
Start: 2024-12-26 | End: 2024-12-26 | Stop reason: HOSPADM

## 2024-12-26 RX ORDER — SODIUM CHLORIDE 0.9 % (FLUSH) 0.9 %
10 SYRINGE (ML) INJECTION EVERY 12 HOURS SCHEDULED
Status: DISCONTINUED | OUTPATIENT
Start: 2024-12-26 | End: 2024-12-26 | Stop reason: HOSPADM

## 2024-12-26 RX ORDER — GLUCAGON 1 MG/ML
1 KIT INJECTION
Status: DISCONTINUED | OUTPATIENT
Start: 2024-12-26 | End: 2024-12-26 | Stop reason: HOSPADM

## 2024-12-26 RX ORDER — DEXTROSE MONOHYDRATE, SODIUM CHLORIDE, AND POTASSIUM CHLORIDE 50; 1.49; 9 G/1000ML; G/1000ML; G/1000ML
125 INJECTION, SOLUTION INTRAVENOUS CONTINUOUS PRN
Status: DISCONTINUED | OUTPATIENT
Start: 2024-12-26 | End: 2024-12-26 | Stop reason: HOSPADM

## 2024-12-26 RX ORDER — SODIUM CHLORIDE, SODIUM LACTATE, POTASSIUM CHLORIDE, CALCIUM CHLORIDE 600; 310; 30; 20 MG/100ML; MG/100ML; MG/100ML; MG/100ML
2000 INJECTION, SOLUTION INTRAVENOUS ONCE
Status: COMPLETED | OUTPATIENT
Start: 2024-12-26 | End: 2024-12-26

## 2024-12-26 RX ORDER — SODIUM CHLORIDE 450 MG/100ML
200 INJECTION, SOLUTION INTRAVENOUS CONTINUOUS PRN
Status: DISCONTINUED | OUTPATIENT
Start: 2024-12-26 | End: 2024-12-26 | Stop reason: HOSPADM

## 2024-12-26 RX ORDER — IOPAMIDOL 612 MG/ML
100 INJECTION, SOLUTION INTRAVASCULAR
Status: COMPLETED | OUTPATIENT
Start: 2024-12-26 | End: 2024-12-26

## 2024-12-26 RX ORDER — NICOTINE POLACRILEX 4 MG
15 LOZENGE BUCCAL
Status: DISCONTINUED | OUTPATIENT
Start: 2024-12-26 | End: 2024-12-26 | Stop reason: HOSPADM

## 2024-12-26 RX ORDER — SODIUM CHLORIDE 9 MG/ML
200 INJECTION, SOLUTION INTRAVENOUS CONTINUOUS PRN
Status: DISCONTINUED | OUTPATIENT
Start: 2024-12-26 | End: 2024-12-26 | Stop reason: HOSPADM

## 2024-12-26 RX ADMIN — IOPAMIDOL 80 ML: 612 INJECTION, SOLUTION INTRAVENOUS at 04:53

## 2024-12-26 RX ADMIN — SODIUM CHLORIDE 1000 ML/HR: 9 INJECTION, SOLUTION INTRAVENOUS at 07:51

## 2024-12-26 RX ADMIN — POTASSIUM CHLORIDE, DEXTROSE MONOHYDRATE AND SODIUM CHLORIDE 125 ML/HR: 150; 5; 450 INJECTION, SOLUTION INTRAVENOUS at 08:42

## 2024-12-26 RX ADMIN — SODIUM CHLORIDE 1000 ML: 9 INJECTION, SOLUTION INTRAVENOUS at 07:49

## 2024-12-26 RX ADMIN — Medication 1750 MG: at 07:30

## 2024-12-26 RX ADMIN — PIPERACILLIN AND TAZOBACTAM 3.38 G: 3; .375 INJECTION, POWDER, FOR SOLUTION INTRAVENOUS at 02:35

## 2024-12-26 RX ADMIN — INSULIN HUMAN 1.1 UNITS/HR: 1 INJECTION, SOLUTION INTRAVENOUS at 08:18

## 2024-12-26 RX ADMIN — MORPHINE SULFATE 4 MG: 4 INJECTION, SOLUTION INTRAMUSCULAR; INTRAVENOUS at 08:38

## 2024-12-26 RX ADMIN — SODIUM CHLORIDE, SODIUM LACTATE, POTASSIUM CHLORIDE, CALCIUM CHLORIDE 2000 ML/HR: 20; 30; 600; 310 INJECTION, SOLUTION INTRAVENOUS at 03:13

## 2024-12-26 NOTE — ED PROVIDER NOTES
EMERGENCY DEPARTMENT ENCOUNTER    Room Number:  108/08  Date seen:  12/26/2024  Time seen: 02:10 EST  PCP: Zain April, APRN        HPI:    53-year-old male with history of pancreatitis and hypertension presents to the ER with abdominal swelling and difficulty breathing.  Patient states he was recently seen and admitted for pancreatitis secondary to severe hypertriglyceridemia.  CT showing acute pancreatitis at that time.  Patient was seen and evaluated here in the ER on 12/17/2024, there was discussion with in house IM Dr. López as well as [facility] consultant regarding necessity of plasmapheresis, but patient not meeting requirements at that time.  Patient was subsequently admitted and discharged on 12/22/2024.  Unfortunately discharge summary was incomplete and current version does not provide any salient information for discharge at the time of this visit, most recent progress note showing good progression of diet and pain CT abdomen pelvis performed on 12/16/2024 shows acute pancreatitis, repeat CT scan performed on 12/20/2024 shows appearance suggestive of progression of pancreatitis with extensive peripancreatic stranding and peripancreatic fluid, no abscess noted at that time.  Extensive thickening of the adjacent duodenum, presumably from pancreatitis.  Patient states that his pancreatitis was secondary to hypertriglyceridemia.    Patient's triglycerides on 12/16/2024 too high to read, repeat triglycerides performed on 12/17/2024 3100 and down trended down to 390 prior to discharge.     Patient's triglycerides were treated with insulin drip and D10 infusion and patient was ultimately restarted on his home fenofibrate on discharge 12/22/2024.    PAST MEDICAL HISTORY  Active Ambulatory Problems     Diagnosis Date Noted    Pancreatitis 09/05/2023    Acute pancreatitis 12/16/2024     Resolved Ambulatory Problems     Diagnosis Date Noted    No Resolved Ambulatory Problems     Past Medical  History:   Diagnosis Date    Anxiety     Hypertension          PAST SURGICAL HISTORY  No past surgical history on file.      FAMILY HISTORY  No family history on file.      SOCIAL HISTORY  Social History     Socioeconomic History    Marital status:    Tobacco Use    Smoking status: Never    Smokeless tobacco: Never   Vaping Use    Vaping status: Never Used   Substance and Sexual Activity    Alcohol use: Never    Drug use: Never    Sexual activity: Yes         ALLERGIES  Patient has no known allergies.        REVIEW OF SYSTEMS    All systems reviewed and negative except for those discussed in HPI.       PHYSICAL EXAM  ED Triage Vitals [12/25/24 2302]   Temp Heart Rate Resp BP SpO2   98.5 °F (36.9 °C) 118 22 138/78 98 %      Temp src Heart Rate Source Patient Position BP Location FiO2 (%)   Oral Monitor Sitting Right arm --       Vital signs and nursing notes reviewed.  GENERAL: Moderate acute distress.  Sitting upright in the gurney.  CV: Normal perfusion.  Tachycardic.  RESPIRATORY: Tachypneic.  No wheezing, rales, rhonchi.  ABDOMEN: Soft.  Distended abdomen.  Nontender.        LAB RESULTS  Recent Results (from the past 24 hours)   ECG 12 Lead ED Triage Standing Order; SOA    Collection Time: 12/25/24 10:55 PM   Result Value Ref Range    QT Interval 326 ms    QTC Interval 462 ms   Comprehensive Metabolic Panel    Collection Time: 12/25/24 11:39 PM    Specimen: Arm, Right; Blood   Result Value Ref Range    Glucose 181 (H) 65 - 99 mg/dL    BUN 22 (H) 6 - 20 mg/dL    Creatinine 1.00 0.76 - 1.27 mg/dL    Sodium 136 136 - 145 mmol/L    Potassium 4.8 3.5 - 5.2 mmol/L    Chloride 97 (L) 98 - 107 mmol/L    CO2 6.7 (C) 22.0 - 29.0 mmol/L    Calcium 9.4 8.6 - 10.5 mg/dL    Total Protein 6.9 6.0 - 8.5 g/dL    Albumin 2.9 (L) 3.5 - 5.2 g/dL    ALT (SGPT) 11 1 - 41 U/L    AST (SGOT) 13 1 - 40 U/L    Alkaline Phosphatase 106 39 - 117 U/L    Total Bilirubin 0.3 0.0 - 1.2 mg/dL    Globulin 4.0 gm/dL    A/G Ratio 0.7 g/dL     BUN/Creatinine Ratio 22.0 7.0 - 25.0    Anion Gap 32.3 (H) 5.0 - 15.0 mmol/L    eGFR 88.9 >60.0 mL/min/1.73   BNP    Collection Time: 12/25/24 11:39 PM    Specimen: Arm, Right; Blood   Result Value Ref Range    proBNP 118.7 0.0 - 900.0 pg/mL   High Sensitivity Troponin T    Collection Time: 12/25/24 11:39 PM    Specimen: Arm, Right; Blood   Result Value Ref Range    HS Troponin T 10 <22 ng/L   Green Top (Gel)    Collection Time: 12/25/24 11:39 PM   Result Value Ref Range    Extra Tube Hold for add-ons.    Lavender Top    Collection Time: 12/25/24 11:39 PM   Result Value Ref Range    Extra Tube hold for add-on    Gold Top - SST    Collection Time: 12/25/24 11:39 PM   Result Value Ref Range    Extra Tube Hold for add-ons.    Light Blue Top    Collection Time: 12/25/24 11:39 PM   Result Value Ref Range    Extra Tube Hold for add-ons.    CBC Auto Differential    Collection Time: 12/25/24 11:39 PM    Specimen: Arm, Right; Blood   Result Value Ref Range    WBC 35.65 (C) 3.40 - 10.80 10*3/mm3    RBC 4.92 4.14 - 5.80 10*6/mm3    Hemoglobin 14.9 13.0 - 17.7 g/dL    Hematocrit 48.3 37.5 - 51.0 %    MCV 98.2 (H) 79.0 - 97.0 fL    MCH 30.3 26.6 - 33.0 pg    MCHC 30.8 (L) 31.5 - 35.7 g/dL    RDW 13.4 12.3 - 15.4 %    RDW-SD 49.2 37.0 - 54.0 fl    MPV 9.6 6.0 - 12.0 fL    Platelets 454 (H) 140 - 450 10*3/mm3   Lactic Acid, Plasma    Collection Time: 12/25/24 11:39 PM    Specimen: Arm, Right; Blood   Result Value Ref Range    Lactate 1.9 0.5 - 2.0 mmol/L   Manual Differential    Collection Time: 12/25/24 11:39 PM    Specimen: Arm, Right; Blood   Result Value Ref Range    Neutrophil % 77.0 (H) 42.7 - 76.0 %    Lymphocyte % 10.0 (L) 19.6 - 45.3 %    Monocyte % 5.0 5.0 - 12.0 %    Bands %  5.0 0.0 - 5.0 %    Metamyelocyte % 1.0 (H) 0.0 - 0.0 %    Myelocyte % 2.0 (H) 0.0 - 0.0 %    Neutrophils Absolute 29.23 (H) 1.70 - 7.00 10*3/mm3    Lymphocytes Absolute 3.57 (H) 0.70 - 3.10 10*3/mm3    Monocytes Absolute 1.78 (H) 0.10 - 0.90  10*3/mm3    Anisocytosis Slight/1+ None Seen    Hypochromia Slight/1+ None Seen    Macrocytes Slight/1+ None Seen    Polychromasia Slight/1+ None Seen    Platelet Morphology Normal Normal   Acetone    Collection Time: 12/25/24 11:39 PM    Specimen: Arm, Right; Blood   Result Value Ref Range    Acetone Small (C) Negative   COVID-19 and FLU A/B PCR, 1 HR TAT - Swab, Nasopharynx    Collection Time: 12/26/24  1:05 AM    Specimen: Nasopharynx; Swab   Result Value Ref Range    COVID19 Not Detected Not Detected - Ref. Range    Influenza A PCR Not Detected Not Detected    Influenza B PCR Not Detected Not Detected   High Sensitivity Troponin T 1Hr    Collection Time: 12/26/24  1:05 AM    Specimen: Blood   Result Value Ref Range    HS Troponin T 9 <22 ng/L    Troponin T Numeric Delta -1 Abnormal if >/=3 ng/L   Lipase    Collection Time: 12/26/24  1:05 AM    Specimen: Blood   Result Value Ref Range    Lipase 41 13 - 60 U/L   Lipid Panel    Collection Time: 12/26/24  1:05 AM    Specimen: Blood   Result Value Ref Range    Total Cholesterol 357 (H) 0 - 200 mg/dL    Triglycerides 386 (H) 0 - 150 mg/dL    HDL Cholesterol 20 (L) 40 - 60 mg/dL    LDL Cholesterol  250 (H) 0 - 100 mg/dL    VLDL Cholesterol 87 (H) 5 - 40 mg/dL    LDL/HDL Ratio 12.99    Comprehensive Metabolic Panel    Collection Time: 12/26/24  7:03 AM    Specimen: Blood   Result Value Ref Range    Glucose 179 (H) 65 - 99 mg/dL    BUN 24 (H) 6 - 20 mg/dL    Creatinine 1.05 0.76 - 1.27 mg/dL    Sodium 135 (L) 136 - 145 mmol/L    Potassium 5.1 3.5 - 5.2 mmol/L    Chloride 97 (L) 98 - 107 mmol/L    CO2 6.0 (C) 22.0 - 29.0 mmol/L    Calcium 9.7 8.6 - 10.5 mg/dL    Total Protein 7.2 6.0 - 8.5 g/dL    Albumin 2.9 (L) 3.5 - 5.2 g/dL    ALT (SGPT) 13 1 - 41 U/L    AST (SGOT) 15 1 - 40 U/L    Alkaline Phosphatase 108 39 - 117 U/L    Total Bilirubin 0.3 0.0 - 1.2 mg/dL    Globulin 4.3 gm/dL    A/G Ratio 0.7 g/dL    BUN/Creatinine Ratio 22.9 7.0 - 25.0    Anion Gap 32.0 (H) 5.0 -  15.0 mmol/L    eGFR 83.8 >60.0 mL/min/1.73   CBC Auto Differential    Collection Time: 12/26/24  7:03 AM    Specimen: Blood   Result Value Ref Range    WBC 45.91 (C) 3.40 - 10.80 10*3/mm3    RBC 4.79 4.14 - 5.80 10*6/mm3    Hemoglobin 14.8 13.0 - 17.7 g/dL    Hematocrit 49.9 37.5 - 51.0 %    .2 (H) 79.0 - 97.0 fL    MCH 30.9 26.6 - 33.0 pg    MCHC 29.7 (L) 31.5 - 35.7 g/dL    RDW 13.6 12.3 - 15.4 %    RDW-SD 52.5 37.0 - 54.0 fl    MPV 9.5 6.0 - 12.0 fL    Platelets 519 (H) 140 - 450 10*3/mm3       Ordered the above labs and reviewed the results.        RADIOLOGY  CT Chest With Contrast Diagnostic, CT Abdomen Pelvis With Contrast    Result Date: 12/26/2024  CLINICAL HISTORY: Abdominal pain, dyspnea, abdominal swelling.  COMPARISON: CT of the abdomen and pelvis on 12/20/2024..  TECHNIQUE: IV and oral contrast were administered and CT of the chest, abdomen and pelvis was obtained.  Multiplanar reformats were generated. Limited exposure control, adjustment of the mA and/or KV according to patient size or use of iterative reconstruction technique was utilized.  FINDINGS:  CT CHEST:  Pulmonary arteries: normal.   Heart, pericardium, aorta, lymph nodes: normal heart size. Coronary artery calcifications are present.  The esophagus is diffusely thick-walled and contains fluid.  Lungs and airways: normal.  Pleura: normal.  Musculoskeletal and chest wall: no acute abnormality.  Lower neck and upper abdomen: no abnormality.   CT ABDOMEN AND PELVIS:  Hepatobiliary: normal.  Pancreas: Necrosis of the distal pancreatic body and tail again seen, with an area of walled off necrosis in the pancreatic parenchyma measuring approximately 4.7 x 1.4 cm.  Extensive adjacent fat stranding and fluid, also extending along the retroperitoneum, similar compared to the previous exam given differences in technique.  Spleen: normal.  Adrenals: A subcentimeter lipoma is noted in the left adrenal gland incidentally.  The right adrenal gland  is normal..  Kidneys, ureters, bladder: normal.  Reproductive: normal.  GI tract/Bowel: Moderate distention of the stomach.  Fluid surrounds the duodenum, as previously, with wall thickening in the first and second duodenum predominantly.  There is some residual oral contrast in the stomach, but a significant portion of the administered contrast extends into the normal caliber small bowel.  The colon is normal.  Appendix: normal.  Peritoneum: No free intraperitoneal air  Vascular: Aortoiliac atherosclerosis.  Lymph nodes: normal.  Musculoskeletal and abdominal wall: normal.       CT CHEST: ESOPHAGEAL WALL THICKENING WITH FLUID IN THE ESOPHAGEAL LUMEN.  FINDINGS SUGGEST ESOPHAGITIS, POSSIBLY FROM REFLUX.  CT ABDOMEN AND PELVIS: SEVERE PANCREATITIS WITH NECROSIS IN THE PANCREATIC TAIL AND FLUID EXTENDING ALONG THE RETROPERITONEUM, SIMILAR COMPARED TO 12/20/2024.  DILATION OF THE STOMACH WITH INFLAMMATION ABOUT THE DUODENUM.  NO GASTRIC OUTLET OBSTRUCTION.   This report was finalized on 12/26/2024 6:11 AM by Fany Faria MD.      XR Chest 1 View    Result Date: 12/26/2024  PROCEDURE: Chest x-ray examination performed on December. Single view.  HISTORY: Shortness of breath. Difficulty breathing.  COMPARISON: None.  FINDINGS:  Normal heart size Mild hypoinflated lungs. No lobar consolidation or edema. 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 Dextroconvex curve at the mid thoracic spine No free air in the upper abdomen.       Hypoinflated lungs. No lobar consolidation or edema. No pleural effusion or pneumothorax. No fracture or dislocation.   This report was finalized on 12/26/2024 12:22 AM by Mukund Brennan MD.       I ordered the above noted radiological studies. Reviewed by me and discussed with radiologist.  See dictation for official radiology interpretation.        PROCEDURES  Procedures      MEDICATIONS GIVEN IN  ER  Medications   sodium chloride 0.9 % flush 10 mL (has no administration in time range)   sodium chloride 0.9 % flush 10 mL (has no administration in time range)   vancomycin 1750 mg/500 mL 0.9% NS IVPB ([facility]) (1,750 mg Intravenous New Bag 12/26/24 0730)   sodium chloride 0.9 % bolus 1,000 mL (1,000 mL Intravenous New Bag 12/26/24 0749)   sodium chloride 0.9 % flush 10 mL (has no administration in time range)   sodium chloride 0.9 % flush 10 mL (has no administration in time range)   sodium chloride 0.9 % infusion 40 mL (has no administration in time range)   dextrose (GLUTOSE) oral gel 15 g (has no administration in time range)   dextrose (D50W) (25 g/50 mL) IV injection 10-50 mL (has no administration in time range)   glucagon HCl (Diagnostic) injection 1 mg (has no administration in time range)   sodium chloride 0.9 % bolus (1,000 mL/hr Intravenous New Bag 12/26/24 0751)   sodium chloride 0.9 % infusion (has no administration in time range)   sodium chloride 0.9 % with KCl 20 mEq/L infusion (has no administration in time range)   sodium chloride 0.9 % with KCl 40 mEq/L infusion (has no administration in time range)   dextrose 5 % and sodium chloride 0.9 % infusion (has no administration in time range)   dextrose 5 % and sodium chloride 0.9 % with KCl 20 mEq/L infusion (has no administration in time range)   dextrose 5% and sodium chloride 0.9% with KCl 40 mEq/L infusion (has no administration in time range)   sodium chloride 0.45 % infusion (has no administration in time range)   sodium chloride 0.45 % with KCl 20 mEq/L infusion (has no administration in time range)   sodium chloride 0.45 % 1,000 mL with potassium chloride 40 mEq infusion (has no administration in time range)   dextrose 5 % and sodium chloride 0.45 % infusion (has no administration in time range)   dextrose 5 % and sodium chloride 0.45 % with KCl 20 mEq/L infusion (has no administration in time range)   dextrose 5 % and sodium chloride 0.45 %  with KCl 40 mEq/L infusion (has no administration in time range)   insulin regular 1 unit/mL in 0.9% sodium chloride (Glucommander) (has no administration in time range)   Potassium Replacement - Follow Nurse / BPA Driven Protocol (has no administration in time range)   Magnesium Standard Dose Replacement - Follow Nurse / BPA Driven Protocol (has no administration in time range)   Phosphorus Replacement - Follow Nurse / BPA Driven Protocol (has no administration in time range)   Calcium Replacement - Follow Nurse / BPA Driven Protocol (has no administration in time range)   piperacillin-tazobactam (ZOSYN) IVPB 3.375 g IVPB in 100 mL NS (VTB) (0 g Intravenous Stopped 12/26/24 0544)   lactated ringers infusion (0 mL/hr Intravenous Stopped 12/26/24 0544)   lactated ringers infusion (0 mL/hr Intravenous Stopped 12/26/24 0544)   iopamidol (ISOVUE-300) 61 % injection 100 mL (80 mL Intravenous Given 12/26/24 0453)   barium (READI-CAT 2) suspension 250 mL (250 mL Oral Given 12/26/24 0455)         MEDICATIONS GIVEN IN ER    Medications   sodium chloride 0.9 % flush 10 mL (has no administration in time range)   sodium chloride 0.9 % flush 10 mL (has no administration in time range)   vancomycin 1750 mg/500 mL 0.9% NS IVPB ([facility]) (1,750 mg Intravenous New Bag 12/26/24 7052)   sodium chloride 0.9 % bolus 1,000 mL (1,000 mL Intravenous New Bag 12/26/24 4397)   sodium chloride 0.9 % flush 10 mL (has no administration in time range)   sodium chloride 0.9 % flush 10 mL (has no administration in time range)   sodium chloride 0.9 % infusion 40 mL (has no administration in time range)   dextrose (GLUTOSE) oral gel 15 g (has no administration in time range)   dextrose (D50W) (25 g/50 mL) IV injection 10-50 mL (has no administration in time range)   glucagon HCl (Diagnostic) injection 1 mg (has no administration in time range)   sodium chloride 0.9 % bolus (1,000 mL/hr Intravenous New Bag 12/26/24 7621)   sodium chloride 0.9 % infusion  (has no administration in time range)   sodium chloride 0.9 % with KCl 20 mEq/L infusion (has no administration in time range)   sodium chloride 0.9 % with KCl 40 mEq/L infusion (has no administration in time range)   dextrose 5 % and sodium chloride 0.9 % infusion (has no administration in time range)   dextrose 5 % and sodium chloride 0.9 % with KCl 20 mEq/L infusion (has no administration in time range)   dextrose 5% and sodium chloride 0.9% with KCl 40 mEq/L infusion (has no administration in time range)   sodium chloride 0.45 % infusion (has no administration in time range)   sodium chloride 0.45 % with KCl 20 mEq/L infusion (has no administration in time range)   sodium chloride 0.45 % 1,000 mL with potassium chloride 40 mEq infusion (has no administration in time range)   dextrose 5 % and sodium chloride 0.45 % infusion (has no administration in time range)   dextrose 5 % and sodium chloride 0.45 % with KCl 20 mEq/L infusion (has no administration in time range)   dextrose 5 % and sodium chloride 0.45 % with KCl 40 mEq/L infusion (has no administration in time range)   insulin regular 1 unit/mL in 0.9% sodium chloride (Glucommander) (has no administration in time range)   Potassium Replacement - Follow Nurse / BPA Driven Protocol (has no administration in time range)   Magnesium Standard Dose Replacement - Follow Nurse / BPA Driven Protocol (has no administration in time range)   Phosphorus Replacement - Follow Nurse / BPA Driven Protocol (has no administration in time range)   Calcium Replacement - Follow Nurse / BPA Driven Protocol (has no administration in time range)   piperacillin-tazobactam (ZOSYN) IVPB 3.375 g IVPB in 100 mL NS (VTB) (0 g Intravenous Stopped 12/26/24 0544)   lactated ringers infusion (0 mL/hr Intravenous Stopped 12/26/24 0544)   lactated ringers infusion (0 mL/hr Intravenous Stopped 12/26/24 0544)   iopamidol (ISOVUE-300) 61 % injection 100 mL (80 mL Intravenous Given 12/26/24 4067)    barium (READI-CAT 2) suspension 250 mL (250 mL Oral Given 12/26/24 0455)         PROGRESS, DATA ANALYSIS, CONSULTS, AND MEDICAL DECISION MAKING    All labs have been independently reviewed by me.  All radiology studies have been reviewed by me and discussed with radiologist dictating the report.   EKG's independently viewed and interpreted by me.  Discussion below represents my analysis of pertinent findings related to patient's condition, differential diagnosis, treatment plan and final disposition.           AS OF 07:53 EST VITALS:    BP - 145/73  HR - 113  TEMP - 98.4 °F (36.9 °C) (Oral)  02 SATS - 99%      MDM    Patient valuated for shortness of breath, recent hospitalization for pancreatitis with triglycerides over 4000, too high to register.  Initial vital signs show tachycardia, otherwise stable.  Patient appears very uncomfortable, declining pain medications, mostly complaining of shortness of breath, patient is tachypneic.    Differential: Pancreatitis, pancreatic necrosis, pneumonia, ascites, gastritis, esophagitis, cholelithiasis, cholecystitis, duodenitis    EKG performed on 12/25/2024 at 2255 shows sinus tachycardia, rate of 121, intervals normal.  No STEMI.    Care delayed due to delay in CT abdomen pelvis.    CT chest shows esophageal wall thickening with fluid in the esophagus suggestive of esophagitis possibly from reflux.    CT abdomen pelvis showing severe pancreatitis with necrosis in the tail of the pancreas as well as fluid extending along the retroperitoneum similar to 12/20/2024.  Dilation of the stomach with inflammation about the duodenum, no gastric outlet obstruction.    This is compared to CT abdomen pelvis performed on 12/20/2024 showing appearance suggestive of progression of pancreatitis with extensive.  Peripancreatic stranding and peripancreatic fluid.    Lab significant for leukocytosis of 35.6 this is significantly increased from leukocytosis of 15 upon discharge on  12/22/2024.    CMP shows CO2 of 6.7, lipid panel showing triglycerides stable at 386.  Lactic acid negative at 1.9, lipase negative at 41.  Delta troponin negative.  proBNP 118.    Patient was given 2 L lactated Ringer's for tachycardia and CO2 of 6.9 possibly signifying significant dehydration vs acidosis, anion gap 32, glucose 180.  Pending repeat CBC and CMP to evaluate for improvement.    Offered pain medication, patient declined on several occasions.    Due to progression of pancreatitis I do feel patient warrants GI evaluation and possibly other surgical specialties.    Discussed case with on-call physician at the transfer center Dr. Del Rio who agrees with management and kindly agrees to accept patient as transfer.      0750 Repeat CO2 6.0, this is worsening compared to prior of 6.7.  Anion gap remains at 32.  Glucose remains around 180.  When speaking with the patient patient now notifies me that he does in fact have diabetes which he did not disclouse in his medical history of on initial evaluation and is not listed in his medical history.  Upon further review of his outpatient medication, patient is on Jardiance, SGLT2 inhibitor.  Concern for euglycemic DKA.  At this time, patient has received 2 L of lactated Ringer's.  I notified patient of this discovery at this time and our care plan.    Care trasnfered to my colleague Dr. Vazquez at time of shift change who will initiate DKA protocol.      DIAGNOSIS  Final diagnoses:   Pancreatic necrosis   Diabetic ketoacidosis without coma associated with other specified diabetes mellitus         DISPOSITION  ED Disposition       ED Disposition   Transfer to Another Facility     Condition   --    Comment   [facility]                              Lon Galvan, DO  12/26/24 0731       Lon Galvan,   12/26/24 0752

## 2024-12-26 NOTE — ED NOTES
MEDICAL SCREENING:    Reason for Visit: SOA    Patient initially seen in triage.  The patient was advised further evaluation and diagnostic testing will be needed, some of the treatment and testing will be initiated in the lobby in order to begin the process.  The patient will be returned to the waiting area for the time being and possibly be re-assessed by a subsequent ED provider.  The patient will be brought back to the treatment area in as timely manner as possible.     Alison Ogden, APRN  12/25/24 2541

## 2024-12-26 NOTE — ED NOTES
Pt POC , Corrected sodium 135, Potassium 5.1 on CMP. Contacted pharmacy for D5 1/2 NS 20 meq K+, pharmacy made aware.

## 2024-12-27 LAB
QT INTERVAL: 326 MS
QTC INTERVAL: 462 MS

## 2024-12-31 LAB
BACTERIA SPEC AEROBE CULT: NORMAL
BACTERIA SPEC AEROBE CULT: NORMAL

## 2025-01-08 ENCOUNTER — READMISSION MANAGEMENT (OUTPATIENT)
Dept: CALL CENTER | Facility: HOSPITAL | Age: 55
End: 2025-01-08
Payer: COMMERCIAL

## 2025-01-08 NOTE — OUTREACH NOTE
Medical Week 3 Survey      Flowsheet Row Responses   [facility] patient discharged from? [facility]   Does the patient have one of the following disease processes/diagnoses(primary or secondary)? Other   Week 3 attempt successful? Yes   Call start time 1012   Call end time 1014   Discharge diagnosis Acute pancreatitis   Meds reviewed with patient/caregiver? Yes   Is the patient having any side effects they believe may be caused by any medication additions or changes? No   Does the patient have all medications ordered at discharge? Yes   Is the patient taking all medications as directed (includes completed medication regime)? Yes   Does the patient have a primary care provider?  Yes   Does the patient have an appointment with their PCP within 7 days of discharge? Yes   Has the patient kept scheduled appointments due by today? Yes   Psychosocial issues? No   Did the patient receive a copy of their discharge instructions? Yes   Nursing interventions Reviewed instructions with patient   What is the patient's perception of their health status since discharge? Improving   Is the patient/caregiver able to teach back signs and symptoms related to disease process for when to call PCP? Yes   Is the patient/caregiver able to teach back signs and symptoms related to disease process for when to call 911? Yes   Is the patient/caregiver able to teach back the hierarchy of who to call/visit for symptoms/problems? PCP, Specialist, Home health nurse, Urgent Care, ED, 911 Yes   If the patient is a current smoker, are they able to teach back resources for cessation? Not a smoker   Week 3 Call Completed? Yes   Graduated Yes   Graduated/Revoked comments Patient is feeling much better.   Call end time 1014            Allen VICTOR - Registered Nurse

## 2025-01-13 ENCOUNTER — OFFICE VISIT (OUTPATIENT)
Dept: GASTROENTEROLOGY | Facility: CLINIC | Age: 55
End: 2025-01-13
Payer: COMMERCIAL

## 2025-01-13 VITALS
SYSTOLIC BLOOD PRESSURE: 119 MMHG | WEIGHT: 184 LBS | HEART RATE: 72 BPM | DIASTOLIC BLOOD PRESSURE: 73 MMHG | HEIGHT: 72 IN | BODY MASS INDEX: 24.92 KG/M2

## 2025-01-13 DIAGNOSIS — K85.91: ICD-10-CM

## 2025-01-13 DIAGNOSIS — K21.9 GASTROESOPHAGEAL REFLUX DISEASE, UNSPECIFIED WHETHER ESOPHAGITIS PRESENT: Primary | ICD-10-CM

## 2025-01-13 DIAGNOSIS — Z12.11 ENCOUNTER FOR SCREENING FOR MALIGNANT NEOPLASM OF COLON: ICD-10-CM

## 2025-01-13 RX ORDER — ATORVASTATIN CALCIUM 10 MG/1
10 TABLET, FILM COATED ORAL DAILY
COMMUNITY

## 2025-01-13 RX ORDER — INSULIN GLARGINE 100 [IU]/ML
6 INJECTION, SOLUTION SUBCUTANEOUS NIGHTLY
COMMUNITY
Start: 2024-12-30

## 2025-01-13 RX ORDER — PANTOPRAZOLE SODIUM 40 MG/1
40 TABLET, DELAYED RELEASE ORAL DAILY
Qty: 60 TABLET | Refills: 5 | Status: SHIPPED | OUTPATIENT
Start: 2025-01-13

## 2025-01-13 NOTE — PROGRESS NOTES
Date of Consultation:  1/15/2025  Referring Physician: Alesha Gunn DO    Chief Complaint  Pancreatitis    Herminio Matson is a 54 y.o. male who presents today to [facility] for Pancreatitis.    HPI:   54-year-old male with PMH of uncontrolled DM2 and Bell's palsy who presents today for evaluation of recurrent pancreatitis.  Patient was initially evaluated in the ER on 12/17/2024 with acute upper abdominal pain.  Lab work was notable for triglycerides >4425 lipase >3000.  CT abdomen pelvis notable for acute pancreatitis.  Patient was presented to  for transfer for possible plasmapheresis given hypertriglyceridemic state.  However, he did not meet criteria.  He was subsequently treated with an insulin drip and D10 infusions.  A repeat CT abdomen pelvis performed on 12/20/2024 showed apparent suggestive of progression of pancreatitis with extensive peripancreatic stranding.  Pancreatic fluid, no abscess noted at that time, extensive thickening of the adjacent duodenum likely secondary from pancreatitis.  He was discharged on fenofibrate.  Patient was subsequently admitted to [facility].  Patient was discharged on 12/22/2024.  Unfortunately, discharge summary is incomplete and unavailable for review.  Patient Caro presented to the [facility] ED on 12/26/2024 with ongoing abdominal swelling and shortness of breath.  Patient was tachycardic and tachypneic on arrival.  CBC on presentation was notable for significant leukocytosis.  He was subsequently given Zosyn prophylactically.  ABG was notable for metabolic acidosis.  There was concern that patient was in euglycemic DKA.  CT chest was notable for esophageal wall thickening with fluid in the esophagus suggestive of esophagitis likely from reflux.  CT abdomen pelvis showed severe pancreatitis with necrosis of the tail of the pancreas as well as fluid extending along the retroperitoneum and  peritoneum, dilation of the stomach with inflammation about the duodenum without gastric outlet obstruction.  Patient was subsequently transferred to  for urgent GI evaluation.  On arrival to , patient was diagnosed with euglycemic DKA with positive urine ketones and serum beta hydroxybutyrate.  He was placed on DKA protocols.  His Jardiance was discontinued as this is contraindicated in the setting of pancreatitis thought to be contributing to euglycemic DKA.  PBS service was consulted and recommended no acute intervention for W ON.  He was given Zosyn due to concern for peripancreatic fluid collection.  Right upper quadrant was notable for biliary sludge and distended gallbladder.  Elective cholecystectomy was recommended as well.  He was referred to [facility].  Patient was discharged on fenofibrate, statin, and fish oil 2 g twice daily.  He was instructed to reduce intake of simple carbohydrates, reduce saturated fat and increase consumption of fish with high omega-3 fatty acids.  Of note, patient has follow-up appointment with  trauma surgery in February 2025 and with  gastroenterology in March 2025.    Today, he presents for follow-up of pancreatitis.  He notes that this is the third or fourth time he has had pancreatitis in the past 5 years.  He had never been on any statin or fenofibrate prior to this last hospitalization.  He notes that he had a 25 pound weight loss over the last 3 weeks secondary to a acute pancreatitis.  He is currently having early satiety and feels full very quickly.  He has frequent acid reflux flares as well.  Notes that he is having issues with constipation and is having 3 maybe 4 bowel movements per week.  He rates his stool as BSS 3-4.  He endorses excessive straining and poor evacuation of his colon.  He has not noticed any bloody or black stools.  He denies nausea, vomiting, weight loss, or any other complaints today.  He denies alcohol use.  Of note, patient has never had  "colorectal cancer screening.  He states that he had a Cologuard shipped to his house 1-2 years ago.  However, he never had this performed.       Previous History:   Past Medical History:   Diagnosis Date    Anxiety     Hypertension     Pancreatitis       History reviewed. No pertinent surgical history.   Social History     Socioeconomic History    Marital status:    Tobacco Use    Smoking status: Never    Smokeless tobacco: Never   Vaping Use    Vaping status: Never Used   Substance and Sexual Activity    Alcohol use: Never    Drug use: Never    Sexual activity: Yes        Current Medications:  Current Outpatient Medications   Medication Sig Dispense Refill    aspirin 81 MG EC tablet Take 1 tablet by mouth Daily.      atorvastatin (LIPITOR) 10 MG tablet Take 1 tablet by mouth Daily.      fenofibrate (TRICOR) 145 MG tablet Take 1 tablet by mouth Every Night.      Insulin Glargine (Lantus SoloStar) 100 UNIT/ML injection pen Inject 6 Units under the skin into the appropriate area as directed Every Night.      metFORMIN (GLUCOPHAGE) 500 MG tablet Take 1 tablet by mouth 2 (Two) Times a Day With Meals.      metoprolol tartrate (LOPRESSOR) 25 MG tablet Take 0.5 tablets by mouth Every 12 (Twelve) Hours for 30 days. 30 tablet 0    Omega-3 Fatty Acids (fish oil) 1000 MG capsule capsule Take 1 capsule by mouth Daily With Breakfast.      pantoprazole (Protonix) 40 MG EC tablet Take 1 tablet by mouth Daily. 60 tablet 5     No current facility-administered medications for this visit.       Allergies:   No Known Allergies    Vitals:   /73   Pulse 72   Ht 182.9 cm (72\")   Wt 83.5 kg (184 lb)   BMI 24.95 kg/m²   Estimated body mass index is 24.95 kg/m² as calculated from the following:    Height as of this encounter: 182.9 cm (72\").    Weight as of this encounter: 83.5 kg (184 lb).    Herminio TiptonMatson  reports that he has never smoked. He has never used smokeless tobacco. I have educated him on the risk of diseases " from using tobacco products such as cancer, COPD, and heart disease.     ROS:   Review of Systems   Constitutional:  Positive for unexpected weight change.   HENT: Negative.     Gastrointestinal:  Positive for abdominal distention and constipation. Negative for abdominal pain, anal bleeding, blood in stool, diarrhea, nausea, rectal pain and vomiting.        + GERD        Physical Exam:   Physical Exam  Vitals reviewed. Exam conducted with a chaperone present.   Constitutional:       General: He is not in acute distress.     Appearance: Normal appearance. He is well-groomed. He is not toxic-appearing.      Comments: Chronically ill-appearing   HENT:      Head: Normocephalic and atraumatic.      Nose: Nose normal.      Mouth/Throat:      Mouth: Mucous membranes are moist.   Eyes:      Extraocular Movements: Extraocular movements intact.   Cardiovascular:      Rate and Rhythm: Normal rate and regular rhythm.      Heart sounds: Normal heart sounds. No murmur heard.  Pulmonary:      Effort: Pulmonary effort is normal. No respiratory distress.      Breath sounds: Normal breath sounds. No stridor. No wheezing or rales.   Abdominal:      General: Bowel sounds are normal. There is no distension.      Palpations: Abdomen is soft. There is no mass.      Tenderness: There is no abdominal tenderness. There is no guarding or rebound.      Hernia: No hernia is present.   Skin:     General: Skin is warm.      Coloration: Skin is not jaundiced.      Findings: No rash.   Neurological:      Mental Status: He is alert and oriented to person, place, and time.   Psychiatric:         Mood and Affect: Mood and affect normal.         Speech: Speech normal.         Behavior: Behavior normal. Behavior is cooperative.         Thought Content: Thought content normal.          Lab Results:   Lab Results   Component Value Date    WBC 7.15 12/30/2024    HGB 10.1 (L) 12/30/2024    HCT 30.5 (L) 12/30/2024    MCV 91 12/30/2024    RDW 12.9 12/30/2024      12/30/2024    NEUTRORELPCT 82.5 (H) 12/17/2024    LYMPHORELPCT 8.7 (L) 12/17/2024    MONORELPCT 7.2 12/17/2024    EOSRELPCT 0.4 12/17/2024    BASORELPCT 0.6 12/17/2024    NEUTROABS 43.16 (H) 12/26/2024    LYMPHSABS 0.92 12/17/2024       Lab Results   Component Value Date     12/26/2024    K 4.3 12/27/2024    CO2 7.6 (C) 12/26/2024     12/27/2024    BUN 25 (H) 12/26/2024    CREATININE 1.04 12/26/2024    GLUCOSE 227 (H) 12/27/2024    CALCIUM 9.6 12/26/2024    ALKPHOS 108 12/26/2024    AST 15 12/26/2024    ALT 13 12/26/2024    BILITOT 0.3 12/26/2024    ALBUMIN 2.9 (L) 12/26/2024    PROTEINTOT 7.2 12/26/2024    MG 1.9 12/30/2024    PHOS 4.1 12/26/2024       Pathology:        Endoscopy:        Imaging:  US Abdomen Limited 12/28/2024   US Abdomen Limited    Result Date: 12/28/2024  Gallbladder sludge within mildly distended gallbladder. Pericholecystic fluid. No biliary ductal dilatation. CRITICAL RESULT: No. COMMUNICATION: Per this written report. Drafted by Anamaria Espinal MD on 12/28/2024 11:22 PM Final report signed by Anamaria Espinal MD on 12/28/2024 11:24 PM    XR Abdomen 1 View    Result Date: 12/26/2024  No significant stool burden. Mild colonic and small bowel distention may represent ileus. CRITICAL RESULT:   No. COMMUNICATION: Per this written report. Drafted by Anamaria Espinal MD on 12/26/2024 9:04 PM Final report signed by Anamaria Espinal MD on 12/26/2024 9:06 PM    CT Chest With Contrast Diagnostic    Result Date: 12/26/2024   CT CHEST: ESOPHAGEAL WALL THICKENING WITH FLUID IN THE ESOPHAGEAL LUMEN.  FINDINGS SUGGEST ESOPHAGITIS, POSSIBLY FROM REFLUX.  CT ABDOMEN AND PELVIS: SEVERE PANCREATITIS WITH NECROSIS IN THE PANCREATIC TAIL AND FLUID EXTENDING ALONG THE RETROPERITONEUM, SIMILAR COMPARED TO 12/20/2024.  DILATION OF THE STOMACH WITH INFLAMMATION ABOUT THE DUODENUM.  NO GASTRIC OUTLET OBSTRUCTION.   This report was finalized on 12/26/2024 6:11 AM by Fany Faria MD.      CT Abdomen Pelvis With  "Contrast    Result Date: 12/26/2024   CT CHEST: ESOPHAGEAL WALL THICKENING WITH FLUID IN THE ESOPHAGEAL LUMEN.  FINDINGS SUGGEST ESOPHAGITIS, POSSIBLY FROM REFLUX.  CT ABDOMEN AND PELVIS: SEVERE PANCREATITIS WITH NECROSIS IN THE PANCREATIC TAIL AND FLUID EXTENDING ALONG THE RETROPERITONEUM, SIMILAR COMPARED TO 12/20/2024.  DILATION OF THE STOMACH WITH INFLAMMATION ABOUT THE DUODENUM.  NO GASTRIC OUTLET OBSTRUCTION.   This report was finalized on 12/26/2024 6:11 AM by Fany Faria MD.      XR Chest 1 View    Result Date: 12/26/2024   Hypoinflated lungs. No lobar consolidation or edema. No pleural effusion or pneumothorax. No fracture or dislocation.   This report was finalized on 12/26/2024 12:22 AM by Mukund Brennan MD.      CT Abdomen Pelvis With Contrast    Result Date: 12/20/2024   1. Appearance suggestive of progression of pancreatitis with extensive peripancreatic stranding and peripancreatic fluid. No abscess at this time  2. Extensive thickening of the adjacent duodenum, presumably from the pancreatitis         This report was finalized on 12/20/2024 11:23 AM by Dr. Braden Landin MD.      CT Abdomen Pelvis With Contrast    Result Date: 12/16/2024   Acute pancreatitis.  This report was finalized on 12/16/2024 9:17 PM by Leonard Morales MD.      XR Chest 1 View    Result Date: 12/16/2024   No evidence of acute disease in the chest.  This report was finalized on 12/16/2024 9:07 PM by Leonard Morales MD.         Results review: During today's encounter, all relevant clinical data has been reviewed.      Assessment and Plan    1. Gastroesophageal reflux disease, unspecified whether esophagitis present (Primary)  Given frequent GERD and CT findings of inflammation surrounding the stomach and duodenum, I did discuss possibly proceeding with EGD/screening colonoscopy.  However, patient's wife is with him and states \"he has to work.\"  Per his wife, patient is sole source of income.  I did call Dr. Callejas for further " discussion.  Dr. Callejas suggested inflammation surrounding stomach and duodenum likely secondary to pancreatitis.  Can defer EGD at this time.  Will trial Protonix 40 mg once daily.  -     pantoprazole (Protonix) 40 MG EC tablet; Take 1 tablet by mouth Daily.  Dispense: 60 tablet; Refill: 5  Will obtain upper GI with small bowel follow-through.  -     [place] Upper GI Double Contrast & SBFT; Future    2. Pancreatitis with uninfected necrosis  Please keep appointment with [facility].  Encourage patient that he would need close follow-up with his PCP in order to closely monitor his triglycerides as it was expected the pancreatitis secondary to hypertriglyceridemia  Please keep appointment with [facility] as well for evaluation of elective cholecystectomy.    3. Encounter for screening for malignant neoplasm of colon  Patient would like to proceed with [facility].  He will discuss this with his PCP.      New Medications:   New Medications Ordered This Visit   Medications    pantoprazole (Protonix) 40 MG EC tablet     Sig: Take 1 tablet by mouth Daily.     Dispense:  60 tablet     Refill:  5       Discontinued Medications:   Medications Discontinued During This Encounter   Medication Reason    empagliflozin (JARDIANCE) 10 MG tablet tablet *Therapy completed    prochlorperazine (COMPAZINE) 5 MG tablet *Therapy completed        Visit Diagnoses:    ICD-10-CM ICD-9-CM   1. Gastroesophageal reflux disease, unspecified whether esophagitis present  K21.9 530.81   2. Pancreatitis with uninfected necrosis  K85.91 577.0   3. Encounter for screening for malignant neoplasm of colon  Z12.11 V76.51            Follow Up:   Return in about 8 weeks (around 3/10/2025).    The patient was in agreement with the plan and all questions were answered to patient's satisfaction.        This document has been electronically signed by Lavinia Carrington PA-C   January 15, 2025 12:13 EST    Dictated Utilizing Dragon Dictation: Part of this note may be an  electronic transcription/translation of spoken language to printed text using the Dragon Dictation System.    CC:  DO Zain Van, April, APRN

## 2025-01-22 ENCOUNTER — HOSPITAL ENCOUNTER (OUTPATIENT)
Dept: GENERAL RADIOLOGY | Facility: HOSPITAL | Age: 55
Discharge: HOME | End: 2025-01-22
Payer: COMMERCIAL

## 2025-01-22 DIAGNOSIS — K85.91: ICD-10-CM

## 2025-01-22 DIAGNOSIS — K21.9 GASTROESOPHAGEAL REFLUX DISEASE, UNSPECIFIED WHETHER ESOPHAGITIS PRESENT: ICD-10-CM

## 2025-01-22 PROCEDURE — 74248 X-RAY SM INT F-THRU STD: CPT | Performed by: RADIOLOGY

## 2025-01-22 PROCEDURE — 74246 X-RAY XM UPR GI TRC 2CNTRST: CPT | Performed by: RADIOLOGY

## 2025-01-22 PROCEDURE — 74246 X-RAY XM UPR GI TRC 2CNTRST: CPT

## 2025-01-22 PROCEDURE — 74248 X-RAY SM INT F-THRU STD: CPT

## 2025-01-23 ENCOUNTER — TELEPHONE (OUTPATIENT)
Dept: GASTROENTEROLOGY | Facility: CLINIC | Age: 55
End: 2025-01-23
Payer: COMMERCIAL

## 2025-01-23 NOTE — TELEPHONE ENCOUNTER
----- Message from Lavinia Carrington sent at 1/23/2025  8:23 AM EST -----  Please let patient know that small bowel follow-through was notable for a very small sliding hiatal hernia and some acid reflux.  Please have him continue on his Protonix.  Please have him keep all follow-up appointments.

## 2025-01-29 ENCOUNTER — OFFICE VISIT (OUTPATIENT)
Dept: ENDOCRINOLOGY | Facility: CLINIC | Age: 55
End: 2025-01-29
Payer: COMMERCIAL

## 2025-01-29 ENCOUNTER — PATIENT ROUNDING (BHMG ONLY) (OUTPATIENT)
Dept: ENDOCRINOLOGY | Facility: CLINIC | Age: 55
End: 2025-01-29
Payer: COMMERCIAL

## 2025-01-29 ENCOUNTER — SPECIALTY PHARMACY (OUTPATIENT)
Dept: PHARMACY | Facility: HOSPITAL | Age: 55
End: 2025-01-29
Payer: COMMERCIAL

## 2025-01-29 VITALS
SYSTOLIC BLOOD PRESSURE: 142 MMHG | OXYGEN SATURATION: 99 % | DIASTOLIC BLOOD PRESSURE: 80 MMHG | HEART RATE: 69 BPM | WEIGHT: 188.4 LBS | BODY MASS INDEX: 25.52 KG/M2 | HEIGHT: 72 IN

## 2025-01-29 DIAGNOSIS — E11.65 TYPE 2 DIABETES MELLITUS WITH HYPERGLYCEMIA, WITH LONG-TERM CURRENT USE OF INSULIN: Primary | ICD-10-CM

## 2025-01-29 DIAGNOSIS — Z79.4 TYPE 2 DIABETES MELLITUS WITH HYPERGLYCEMIA, WITH LONG-TERM CURRENT USE OF INSULIN: Primary | ICD-10-CM

## 2025-01-29 LAB
ALBUMIN SERPL-MCNC: 3.6 G/DL (ref 3.5–5.2)
ALBUMIN UR-MCNC: <1.2 MG/DL
ALBUMIN/GLOB SERPL: 1.1 G/DL
ALP SERPL-CCNC: 66 U/L (ref 39–117)
ALT SERPL W P-5'-P-CCNC: 8 U/L (ref 1–41)
ANION GAP SERPL CALCULATED.3IONS-SCNC: 7.7 MMOL/L (ref 5–15)
AST SERPL-CCNC: 10 U/L (ref 1–40)
BILIRUB SERPL-MCNC: <0.2 MG/DL (ref 0–1.2)
BUN SERPL-MCNC: 16 MG/DL (ref 6–20)
BUN/CREAT SERPL: 18.6 (ref 7–25)
CALCIUM SPEC-SCNC: 9.2 MG/DL (ref 8.6–10.5)
CHLORIDE SERPL-SCNC: 102 MMOL/L (ref 98–107)
CHOLEST SERPL-MCNC: 115 MG/DL (ref 0–200)
CO2 SERPL-SCNC: 27.3 MMOL/L (ref 22–29)
CREAT SERPL-MCNC: 0.86 MG/DL (ref 0.76–1.27)
CREAT UR-MCNC: 30.7 MG/DL
D-LACTATE SERPL-SCNC: 1.5 MMOL/L (ref 0.5–2)
EGFRCR SERPLBLD CKD-EPI 2021: 101.6 ML/MIN/1.73
EXPIRATION DATE: ABNORMAL
GLOBULIN UR ELPH-MCNC: 3.3 GM/DL
GLUCOSE BLDC GLUCOMTR-MCNC: 339 MG/DL (ref 70–130)
GLUCOSE SERPL-MCNC: 253 MG/DL (ref 65–99)
HDLC SERPL-MCNC: 32 MG/DL (ref 40–60)
LDLC SERPL CALC-MCNC: 61 MG/DL (ref 0–100)
LDLC/HDLC SERPL: 1.83 {RATIO}
Lab: ABNORMAL
MICROALBUMIN/CREAT UR: NORMAL MG/G{CREAT}
POTASSIUM SERPL-SCNC: 3.8 MMOL/L (ref 3.5–5.2)
PROT SERPL-MCNC: 6.9 G/DL (ref 6–8.5)
SODIUM SERPL-SCNC: 137 MMOL/L (ref 136–145)
TRIGL SERPL-MCNC: 122 MG/DL (ref 0–150)
TSH SERPL DL<=0.05 MIU/L-ACNC: 2.34 UIU/ML (ref 0.27–4.2)
VLDLC SERPL-MCNC: 22 MG/DL (ref 5–40)

## 2025-01-29 PROCEDURE — 80061 LIPID PANEL: CPT

## 2025-01-29 PROCEDURE — 80053 COMPREHEN METABOLIC PANEL: CPT

## 2025-01-29 PROCEDURE — 86341 ISLET CELL ANTIBODY: CPT

## 2025-01-29 PROCEDURE — 82043 UR ALBUMIN QUANTITATIVE: CPT

## 2025-01-29 PROCEDURE — 83605 ASSAY OF LACTIC ACID: CPT

## 2025-01-29 PROCEDURE — 82570 ASSAY OF URINE CREATININE: CPT

## 2025-01-29 PROCEDURE — 86337 INSULIN ANTIBODIES: CPT

## 2025-01-29 PROCEDURE — 84443 ASSAY THYROID STIM HORMONE: CPT

## 2025-01-29 PROCEDURE — 84681 ASSAY OF C-PEPTIDE: CPT

## 2025-01-29 RX ORDER — INSULIN GLARGINE 100 [IU]/ML
35 INJECTION, SOLUTION SUBCUTANEOUS NIGHTLY
Qty: 15 ML | Refills: 2 | Status: SHIPPED | OUTPATIENT
Start: 2025-01-29

## 2025-01-29 NOTE — PROGRESS NOTES
A [employer] message has been sent to the patient for patient rounding with [facility]

## 2025-01-29 NOTE — PROGRESS NOTES
Specialty Pharmacy Patient Management Program  Endocrinology Initial Assessment       Herminio Matson is a 54 y.o. male with Type 2 Diabetes seen by an Endocrinology provider and enrolled in the Endocrinology Patient Management program offered by [facility].  An initial outreach was conducted, including assessment of therapy appropriateness and specialty medication education for Lantus and Metformin. The patient was introduced to services offered by [facility], including: regular assessments, refill coordination, curbside pick-up or mail order delivery options, prior authorization maintenance, and financial assistance programs as applicable. The patient was also provided with contact information for the pharmacy team.     Patient is currently taking Lantus 6 units nightly and Metformin 500mg po bid with meals.  Pt had admission at  for biliary sludge, necrotizing pancreatitis, and DKA on 12/26/24.    Patient checks BG twice daily with readings in the 200's range, sometimes over 300. Patient denies low BG <70. The patient denies any side effects or adherence issues with this current diabetic medications    Complications include: h/o pancreatitis, biliary sludge, GERD    Patient denies personal/family history of thyroid cancer, reports history of pancreatitis, and denies issues with recurrent UTI/yeast infections.     In the past, the patient has tried:     Drug Dose Reason for Discontinuation Notes   Glipizide  Pt not sure why it was dc'ed    Farxiga  Switched to Jardiance due to insurance issues    Jardiance  Pt states that he went into DKA while on Jardiance, this med was dc'ed during admission at                                                 Insurance Coverage & Financial Support  [facility]     Relevant Past Medical History and Comorbidities  Relevant medical history and concomitant health conditions were discussed with the patient. The patient's chart has  been reviewed for relevant past medical history and comorbid health conditions and updated as necessary.   Past Medical History:   Diagnosis Date    Anxiety     Hypertension     Pancreatitis     Type 2 diabetes mellitus      Social History     Socioeconomic History    Marital status:    Tobacco Use    Smoking status: Never     Passive exposure: Never    Smokeless tobacco: Current     Types: Snuff   Vaping Use    Vaping status: Never Used   Substance and Sexual Activity    Alcohol use: Never    Drug use: Never    Sexual activity: Yes       Problem list reviewed by David Victoria RPH on 1/29/2025 at 10:26 AM    Allergies  Known allergies and reactions were discussed with the patient. The patient's chart has been reviewed for  allergy information and updated as necessary.   Allergies   Allergen Reactions    Empagliflozin Other (See Comments)     Diabetic Ketoacidosis       Allergies reviewed by David Victoria RPH on 1/29/2025 at  8:21 AM  Allergies reviewed by aDvid Victoria RPH on 1/29/2025 at 10:26 AM    Relevant Laboratory Values  A1C Last 3 Results          12/26/2024    08:27   HGBA1C Last 3 Results   Hemoglobin A1C 9.80      Lab Results   Component Value Date    HGBA1C 9.80 (H) 12/26/2024     Lab Results   Component Value Date    GLUCOSE 227 (H) 12/27/2024    CALCIUM 9.6 12/26/2024     12/26/2024    K 4.3 12/27/2024    CO2 7.6 (C) 12/26/2024     12/27/2024    BUN 25 (H) 12/26/2024    CREATININE 1.04 12/26/2024    BCR 24.0 12/26/2024    ANIONGAP 31.4 (H) 12/26/2024     Lab Results   Component Value Date    CHOL 357 (H) 12/26/2024    CHLPL 433 (H) 07/02/2019    TRIG 386 (H) 12/26/2024    HDL 20 (L) 12/26/2024     (H) 12/26/2024         Current Medication List  This medication list has been reviewed with the patient and evaluated for any interactions or necessary modifications/recommendations, and updated to include all prescription medications, OTC medications, and supplements the  patient is currently taking.  This list reflects what is contained in the patient's profile, which has also been marked as reviewed to communicate to other providers it is the most up to date version of the patient's current medication therapy.     Current Outpatient Medications:     atorvastatin (LIPITOR) 40 MG tablet, Take 1 tablet by mouth Daily., Disp: , Rfl:     fenofibrate (TRICOR) 145 MG tablet, Take 1 tablet by mouth Daily., Disp: , Rfl:     metFORMIN (GLUCOPHAGE) 500 MG tablet, Take 1 tablet by mouth 2 (Two) Times a Day With Meals., Disp: , Rfl:     metoprolol tartrate (LOPRESSOR) 25 MG tablet, Take 0.5 tablets by mouth Every 12 (Twelve) Hours for 30 days. (Patient taking differently: Take 1 tablet by mouth Daily.), Disp: 30 tablet, Rfl: 0    Omega-3 Fatty Acids (fish oil) 1000 MG capsule capsule, Take 1 capsule by mouth 2 (Two) Times a Day With Meals., Disp: , Rfl:     pantoprazole (Protonix) 40 MG EC tablet, Take 1 tablet by mouth Daily., Disp: 60 tablet, Rfl: 5    Insulin Glargine (Lantus SoloStar) 100 UNIT/ML injection pen, Inject 35 Units under the skin into the appropriate area as directed Every Night., Disp: 15 mL, Rfl: 2    Insulin Pen Needle 32G X 4 MM misc, Use 1 each Daily., Disp: 100 each, Rfl: 3    Medicines reviewed by David Victoria [facility] on 1/29/2025 at  8:27 AM    Drug Interactions  -Metoprolol may enhance the hypoglycemic effect of antidiabetic agents    Recommended Medications Assessment  Aspirin -  Not Taking Currently  Statin -  Currently Taking   ACEi/ARB - Not Taking Currently       Adherence and Self-Administration  Adherence related to the patient's specialty therapy was discussed with the patient. The Adherence segment of this outreach has been reviewed and updated.     Barriers to Patient Adherence and/or Self-Administration: none   Methods for Supporting Patient Adherence and/or Self-Administration: none    Vaccination Status:   Recommended flu vaccine and Prevnar-20  vaccine.    Smoker?  no    Goals of Therapy  Goals related to the patient's specialty therapy were discussed with the patient. The Patient Goals segment of this outreach has been reviewed and updated.    Goals Addressed Today        Specialty Pharmacy General Goal      A1c < 7%--Pt not at goal as A1c 9.8% in December 2024              Reassessment Plan & Follow-Up  Patient's diabetes is uncontrolled with A1C of 9.8%.  Medication Therapy Changes:  Per Jeannie Blackwell APRN:  Increase Lantus to 24 units qhs (can increase by 2 units every 3 days if glucose > 150 piror to breakfast)  Continue Metformin 500mg po bid  Provider is testing pt for T1DM.  Provider is also rechecking lipid panel today to see if it has improved post-admission as pt is currently taking lipitor 40mg daily, fenofibrate 145mg po daily, and fish oil 1g po bid.  Pt given samples of Dexcom G6 by provider.   Related Plans, Therapy Recommendations or Therapy Problems to Be Addressed:   Provider asked about cost of Dexcom G6 for this patient.  Benefits investigation was performed and provider informed pt's cost with insurance is $40 per month.  Recommended increase in Lantus dose.  Recommendation accepted by provider.  Recommended that provider check lactic acid level as pt recently had DKA and is on Metformin.  Lab ordered by provider.  Of note, pt states that he takes metoprolol tartrate 25mg po daily.  Recommended that pt speak with the provider that prescribes this as the tartrate formulation is normally split into bid doses and the succinate formulation normally is given once daily.  Recommended flu vaccine and Prevnar-20 vaccine.  Patient denies issues with affordability or adherence at this time.       Patient does not wish to use [facility] at this time due to: loyalty to retail pharmacy    Attestation  I attest the patient was actively involved in and has agreed to the above plan of care. If the prescribed therapy is at any point  deemed not appropriate based on the current or future assessments, a consultation will be initiated with the patient's specialty care provider to determine the best course of action. The revised plan of therapy will be documented along with any required assessments and/or additional patient education provided..    David Victoria RPH  01/29/25  10:44 EST

## 2025-01-29 NOTE — ASSESSMENT & PLAN NOTE
-Diabetes is not controlled with A1c 9.8.   -Will increase Lantus to 24 units daily (weight based dosing)  -Continue Metformin 500 mg BID  -Will check C-peptide and T1DM antibodies to see if Patient has flipped to type 1.5 with recent episode of DKA and uncontrolled blood sugars.   -Sample Dexcom G6 placed in office today. If patient likes CGM he will let us know at follow up and we will prescribe it for him.  -Discussed dietary and exercise guidelines with patient. 150 g carbs per day and 150 minutes of exercise per week. Increase protein with complex carbs. Avoid foods high in refined sugars and sugary drinks.   -Discussed the importance of yearly eye exams.  -Discussed Glucagon use and appropriate timing for this.   -S/S hypoglycemia reviewed with Rule of 15's advised.  -Discussed long term risks of untreated/undertreated diabetes including retinopathy, neuropathy, nephropathy, amputations, hospitalizations, MI, CVA.

## 2025-01-29 NOTE — PATIENT INSTRUCTIONS
01/29/25    Herminio Matson 1970     Your A1C is:   Lab Results   Component Value Date    HGBA1C 9.80 (H) 12/26/2024    HGBA1C  12/16/2024      Comment:      Questionable results due to grossly lipemic specimen.    Dr. Gong in ER aware of lipemia interference. Dr. Gunn (hospitalist) aware of corrected report.  Corrected result. Previous result was 8.80 % on 12/16/2024 at 2231 EST.    HGBA1C 10.30 (H) 09/06/2023       ADA General Goals: A1c: < 7%                                                  Fasting/before meal glucose:  mg/dL                                    2 Hour after meal glucoses: < 180 mg/dL                                        Bedtime glucose:120-180                Lantus is long acting insulin that lasts 24 hours. It is taken at bedtime every night and should not be skipped unless instructed by your doctor         Starting Basal Insulin Dose: 24  units before bedtime           Change insulin doses every 3 days based on the following:    If before breakfast blood sugar readings are consistently higher than 150 for 3 days, raise insulin dose by 2 units.    If after 2 weeks, before meal blood sugars are not lower than 150, call the office.    If you are having frequent blood sugars lower than 70, call the office.    CASPER Amaya

## 2025-01-29 NOTE — PROGRESS NOTES
"Chief Complaint   Patient presents with    Diabetes     New patient, last A1c 12/26/24 value 9.80       HPI   Herminio Matson is a 54 y.o. male who presents to the clinic today for initial evaluation of type 2 diabetes. Diabetes was diagnosed at least 10 years ago. Last A1c was 9.8 on 12/16/2024. He was recently hospitalized for DKA and pancreatitis. This was the first time he has had DKA and pancreatitis. They believe his pancreatitis was due to elevated triglycerides. After this time his Jardiance was discontinued. He checks his glucose twice daily. Fasting glucose is averaging 200s to 300s. Glucose is 300s before he goes to bed. He is currently taking metformin 500 mg twice daily and Lantus 6 units daily. He denies any hypoglycemia.     Past medications: Jardiance- DKA, Farxiga- cost issues, glipizide- unsure why it was stopped  Diabetic complications: none  Last optho exam: due and advised  Last microalbumin: today  Last foot exam: today  Statin: yes   Lipid panel: today  ACE/ARB: no     Nutrition:     Current diet: 2 meals per day  Breakfast: egg whites and toast, turkey sausage  Dinner: vegetable bowl, turkey sandwich  Drinks: water, diet dr pepper    The following portions of the patient's history were reviewed and updated as appropriate: allergies, current medications, past family history, past medical history, past social history, past surgical history, and problem list.    /80 (BP Location: Left arm, Patient Position: Sitting, Cuff Size: Adult)   Pulse 69   Ht 182.9 cm (72\")   Wt 85.5 kg (188 lb 6.4 oz)   SpO2 99%   BMI 25.55 kg/m²    Past Medical History:   Diagnosis Date    Anxiety     Hypertension     Pancreatitis     Type 2 diabetes mellitus      History reviewed. No pertinent surgical history.   Family History   Problem Relation Age of Onset    Diabetes Father       Social History     Socioeconomic History    Marital status:    Tobacco Use    Smoking status: Never     Passive " exposure: Never    Smokeless tobacco: Current     Types: Snuff   Vaping Use    Vaping status: Never Used   Substance and Sexual Activity    Alcohol use: Never    Drug use: Never    Sexual activity: Yes      Allergies   Allergen Reactions    Empagliflozin Other (See Comments)     Diabetic Ketoacidosis      Current Outpatient Medications on File Prior to Visit   Medication Sig Dispense Refill    atorvastatin (LIPITOR) 40 MG tablet Take 1 tablet by mouth Daily.      fenofibrate (TRICOR) 145 MG tablet Take 1 tablet by mouth Daily.      Insulin Glargine (Lantus SoloStar) 100 UNIT/ML injection pen Inject 6 Units under the skin into the appropriate area as directed Every Night.      metFORMIN (GLUCOPHAGE) 500 MG tablet Take 1 tablet by mouth 2 (Two) Times a Day With Meals.      metoprolol tartrate (LOPRESSOR) 25 MG tablet Take 0.5 tablets by mouth Every 12 (Twelve) Hours for 30 days. (Patient taking differently: Take 1 tablet by mouth Daily.) 30 tablet 0    Omega-3 Fatty Acids (fish oil) 1000 MG capsule capsule Take 1 capsule by mouth 2 (Two) Times a Day With Meals.      pantoprazole (Protonix) 40 MG EC tablet Take 1 tablet by mouth Daily. 60 tablet 5    [DISCONTINUED] aspirin 81 MG EC tablet Take 1 tablet by mouth Daily.       No current facility-administered medications on file prior to visit.      Review of Systems   Constitutional:  Positive for fatigue and unexpected weight loss.   Cardiovascular:  Negative for leg swelling.   Endocrine: Positive for polydipsia, polyphagia and polyuria.   Psychiatric/Behavioral:  Positive for sleep disturbance.       Physical Exam  Vitals reviewed.   Constitutional:       Appearance: Normal appearance.   HENT:      Head: Normocephalic.   Eyes:      Pupils: Pupils are equal, round, and reactive to light.   Cardiovascular:      Pulses: Normal pulses.           Dorsalis pedis pulses are 2+ on the right side and 2+ on the left side.        Posterior tibial pulses are 2+ on the right side  "and 2+ on the left side.   Pulmonary:      Effort: Pulmonary effort is normal.   Abdominal:      Palpations: Abdomen is soft.   Musculoskeletal:      Cervical back: Normal range of motion.   Feet:      Right foot:      Protective Sensation: 6 sites tested.  4 sites sensed.      Skin integrity: Dry skin present.      Toenail Condition: Right toenails are abnormally thick. Fungal disease present.     Left foot:      Protective Sensation: 6 sites tested.  6 sites sensed.      Skin integrity: Dry skin present.      Toenail Condition: Left toenails are abnormally thick. Fungal disease present.     Comments: Diabetic Foot Exam Performed and Monofilament Test Performed     Skin:     General: Skin is warm and dry.   Neurological:      Mental Status: He is alert and oriented to person, place, and time.   Psychiatric:         Mood and Affect: Mood normal.         Behavior: Behavior normal.       Labs/Imaging  CMP:  Lab Results   Component Value Date    BUN 25 (H) 12/26/2024    CREATININE 1.04 12/26/2024    EGFR 84.8 12/26/2024    BCR 24.0 12/26/2024     12/26/2024    K 4.3 12/27/2024    CO2 7.6 (C) 12/26/2024    CALCIUM 9.6 12/26/2024    ALBUMIN 2.9 (L) 12/26/2024    AGRATIO 0.7 12/26/2024    BILITOT 0.3 12/26/2024    ALKPHOS 108 12/26/2024    AST 15 12/26/2024    ALT 13 12/26/2024     Lipid Panel:  Lab Results   Component Value Date    CHOL 357 (H) 12/26/2024    TRIG 386 (H) 12/26/2024    HDL 20 (L) 12/26/2024    VLDL 87 (H) 12/26/2024     (H) 12/26/2024     HbA1c:  Lab Results   Component Value Date    HGBA1C 9.80 (H) 12/26/2024    HGBA1C  12/16/2024      Comment:      Questionable results due to grossly lipemic specimen.    Dr. Gong in ER aware of lipemia interference. Dr. Gunn (hospitalist) aware of corrected report.  Corrected result. Previous result was 8.80 % on 12/16/2024 at 2231 EST.    HGBA1C 10.30 (H) 09/06/2023     Microalbumin:  No results found for: \"MALBCRERATIO\"  TSH:  No results found for: " "\"TSH\"    Assessment and Plan  Diagnoses and all orders for this visit:    1. Type 2 diabetes mellitus with hyperglycemia, with long-term current use of insulin (Primary)  Assessment & Plan:  -Diabetes is not controlled with A1c 9.8.   -Will increase Lantus to 24 units daily (weight based dosing)  -Continue Metformin 500 mg BID  -Will check C-peptide and T1DM antibodies to see if Patient has flipped to type 1.5 with recent episode of DKA and uncontrolled blood sugars.   -Sample Dexcom G6 placed in office today. If patient likes CGM he will let us know at follow up and we will prescribe it for him.  -Discussed dietary and exercise guidelines with patient. 150 g carbs per day and 150 minutes of exercise per week. Increase protein with complex carbs. Avoid foods high in refined sugars and sugary drinks.   -Discussed the importance of yearly eye exams.  -Discussed Glucagon use and appropriate timing for this.   -S/S hypoglycemia reviewed with Rule of 15's advised.  -Discussed long term risks of untreated/undertreated diabetes including retinopathy, neuropathy, nephropathy, amputations, hospitalizations, MI, CVA.      Orders:  -     POC Glucose, Blood  -     Comprehensive Metabolic Panel  -     Lipid Panel  -     Microalbumin / Creatinine Urine Ratio - Urine, Clean Catch  -     TSH  -     Lactic Acid, Plasma  -     Anti-islet Cell Antibody  -     Glutamic Acid Decarboxylase  -     IA-2 Autoantibodies  -     Insulin Antibody  -     ZNT8 Antibodies  -     C-Peptide       Return in about 2 weeks (around 2/12/2025). The patient was instructed to contact the clinic with any interval questions or concerns.    Please note that portions of this document were completed with a voice recognition program. Efforts were made to edit the dictations, but occasionally words are mis-transcribed.  This document has been electronically signed by CASPER Amaya  January 29, 2025 09:38 EST   "

## 2025-01-30 LAB — C PEPTIDE SERPL-MCNC: 2.1 NG/ML (ref 1.1–4.4)

## 2025-01-31 LAB — PANC ISLET CELL AB TITR SER: NEGATIVE {TITER}

## 2025-02-04 LAB — GAD65 AB SER IA-ACNC: <5 U/ML (ref 0–5)

## 2025-02-05 LAB — INSULIN AB SER-ACNC: <5 UU/ML

## 2025-02-06 LAB — ISLET CELL512 AB SER-ACNC: <7.5 U/ML

## 2025-02-08 LAB — ZNT8 AB SERPL IA-ACNC: <15 U/ML

## 2025-02-12 ENCOUNTER — OFFICE VISIT (OUTPATIENT)
Dept: ENDOCRINOLOGY | Facility: CLINIC | Age: 55
End: 2025-02-12
Payer: COMMERCIAL

## 2025-02-12 VITALS
DIASTOLIC BLOOD PRESSURE: 74 MMHG | WEIGHT: 190.2 LBS | SYSTOLIC BLOOD PRESSURE: 144 MMHG | BODY MASS INDEX: 25.8 KG/M2 | HEART RATE: 73 BPM | OXYGEN SATURATION: 99 %

## 2025-02-12 DIAGNOSIS — E11.65 TYPE 2 DIABETES MELLITUS WITH HYPERGLYCEMIA, WITH LONG-TERM CURRENT USE OF INSULIN: Primary | ICD-10-CM

## 2025-02-12 DIAGNOSIS — Z79.4 TYPE 2 DIABETES MELLITUS WITH HYPERGLYCEMIA, WITH LONG-TERM CURRENT USE OF INSULIN: Primary | ICD-10-CM

## 2025-02-12 LAB
EXPIRATION DATE: ABNORMAL
GLUCOSE BLDC GLUCOMTR-MCNC: 230 MG/DL (ref 70–130)
Lab: ABNORMAL

## 2025-02-12 RX ORDER — METFORMIN HYDROCHLORIDE 500 MG/1
1000 TABLET, EXTENDED RELEASE ORAL
Qty: 120 TABLET | Refills: 3 | Status: SHIPPED | OUTPATIENT
Start: 2025-02-12

## 2025-02-12 RX ORDER — HYDROCHLOROTHIAZIDE 12.5 MG/1
CAPSULE ORAL
Qty: 6 EACH | Refills: 2 | Status: SHIPPED | OUTPATIENT
Start: 2025-02-12

## 2025-02-12 RX ORDER — KETOROLAC TROMETHAMINE 30 MG/ML
1 INJECTION, SOLUTION INTRAMUSCULAR; INTRAVENOUS TAKE AS DIRECTED
Qty: 1 EACH | Refills: 0 | Status: SHIPPED | OUTPATIENT
Start: 2025-02-12

## 2025-02-12 NOTE — PATIENT INSTRUCTIONS
02/12/25    Herminio Matson 1970     Your A1C is:   Lab Results   Component Value Date    HGBA1C 9.80 (H) 12/26/2024    HGBA1C  12/16/2024      Comment:      Questionable results due to grossly lipemic specimen.    Dr. Gong in ER aware of lipemia interference. Dr. Gunn (hospitalist) aware of corrected report.  Corrected result. Previous result was 8.80 % on 12/16/2024 at 2231 EST.    HGBA1C 10.30 (H) 09/06/2023       ADA General Goals: A1c: < 7%                                                  Fasting/before meal glucose: <150 mg/dL                                    2 Hour after meal glucoses: < 180 mg/dL                                        Bedtime glucose:120-180              -Metformin 1,000 mg (2 pills) with breakfast and dinner (twice a day)       Lantus is long acting insulin that lasts 24 hours. It is taken at bedtime every night and should not be skipped unless instructed by your doctor         Starting Basal Insulin Dose: 30  units before bedtime           Change insulin doses every 3 days based on the following:    If before breakfast blood sugar readings are consistently higher than 150 for 3-4 days, raise insulin dose by 2 units.    If after 2 weeks, before meal blood sugars are not lower than 150, call the office.    If you are having frequent blood sugars lower than 70, call the office.    CASPER Amaya

## 2025-02-12 NOTE — ASSESSMENT & PLAN NOTE
-Diabetes is not controlled with A1c 9.8 on 12/26/24, however it is improving with GMI of 8.6% today.   -CGM data reviewed from the last two weeks shows average glucose 219 with standard deviation of 42. In target range 19%, high 57%, very high 24%, low 0%, very low 0%.   -Pattern of fasting hyperglycemia with postprandial hyperglycemia  -Will send Olga 3 plus CGM  -Will increase Lantus to 30 units daily.  Instructed patient to increase dose by 2 units every 3 days for fasting blood sugars greater than 150.  He reports his fasting blood sugars are 130s to 180s right now.  -Will increase metformin to 1000 mg extended release twice daily  -He is not currently interested in GLP-1 therapy due to potential weight loss.  He does not want to lose anymore weight.  -Discussed dietary and exercise guidelines with patient. 150 g carbs per day and 150 minutes of exercise per week. Increase protein with complex carbs. Avoid foods high in refined sugars and sugary drinks.   -Discussed the importance of yearly eye exams.  -Discussed Glucagon use and appropriate timing for this.   -S/S hypoglycemia reviewed with Rule of 15's advised.  -Discussed long term risks of untreated/undertreated diabetes including retinopathy, neuropathy, nephropathy, amputations, hospitalizations, MI, CVA.

## 2025-02-12 NOTE — PROGRESS NOTES
Chief Complaint   Patient presents with    Follow-up     T2DM, last A1c 9.8 (12/26/2024)       HPI   Herminio Matson is a 54 y.o. male who presents to the clinic today for follow-up of type 2 diabetes.  Previous workup for type 1 diabetes was negative at last office visit.  Antibodies and C-peptide were normal.  Diabetes was diagnosed at least 10 years ago.  Last A1c was 9.8 on 12/16/2024.  He checks his glucose with Dexcom G6 CGM. Fasting glucose is averaging 130s to 180s.  He is currently taking Lantus 26 units daily and metformin 500 mg twice daily.  He has been on a higher dose of metformin in the past, and tolerated it okay.  He is unsure of GLP-1 injection at this time due to potential for weight loss.  He denies any hypoglycemia.     Past medications: Jardiance- DKA, Farxiga- cost issues, glipizide- unsure why it was stopped  Diabetic complications: none  Last optho exam: due and advised  Last microalbumin: UTD  Last foot exam: UTD  Statin: yes   Lipid panel: UTD  ACE/ARB: no     The following portions of the patient's history were reviewed and updated as appropriate: allergies, current medications, past family history, past medical history, past social history, past surgical history, and problem list.    /74 (BP Location: Right arm, Patient Position: Sitting, Cuff Size: Adult)   Pulse 73   Wt 86.3 kg (190 lb 3.2 oz)   SpO2 99%   BMI 25.80 kg/m²    Past Medical History:   Diagnosis Date    Anxiety     Hypertension     Pancreatitis     Type 2 diabetes mellitus      History reviewed. No pertinent surgical history.   Family History   Problem Relation Age of Onset    Diabetes Father       Social History     Socioeconomic History    Marital status:    Tobacco Use    Smoking status: Never     Passive exposure: Never    Smokeless tobacco: Current     Types: Snuff   Vaping Use    Vaping status: Never Used   Substance and Sexual Activity    Alcohol use: Never    Drug use: Never    Sexual activity:  Yes      Allergies   Allergen Reactions    Empagliflozin Other (See Comments)     Diabetic Ketoacidosis      Current Outpatient Medications on File Prior to Visit   Medication Sig Dispense Refill    atorvastatin (LIPITOR) 40 MG tablet Take 1 tablet by mouth Daily.      fenofibrate (TRICOR) 145 MG tablet Take 1 tablet by mouth Daily.      Insulin Glargine (Lantus SoloStar) 100 UNIT/ML injection pen Inject 35 Units under the skin into the appropriate area as directed Every Night. 15 mL 2    Insulin Pen Needle 32G X 4 MM misc Use 1 each Daily. 100 each 3    Omega-3 Fatty Acids (fish oil) 1000 MG capsule capsule Take 1 capsule by mouth 2 (Two) Times a Day With Meals.      pantoprazole (Protonix) 40 MG EC tablet Take 1 tablet by mouth Daily. 60 tablet 5    [DISCONTINUED] metFORMIN (GLUCOPHAGE) 500 MG tablet Take 1 tablet by mouth 2 (Two) Times a Day With Meals.      metoprolol tartrate (LOPRESSOR) 25 MG tablet Take 0.5 tablets by mouth Every 12 (Twelve) Hours for 30 days. (Patient taking differently: Take 1 tablet by mouth Daily.) 30 tablet 0     No current facility-administered medications on file prior to visit.      Review of Systems   Constitutional:  Positive for fatigue and unexpected weight loss.   Eyes:  Positive for blurred vision.   Endocrine: Positive for polydipsia, polyphagia and polyuria.   Psychiatric/Behavioral:  Positive for sleep disturbance.       Physical Exam  Vitals reviewed.   Constitutional:       Appearance: Normal appearance.   HENT:      Head: Normocephalic.   Eyes:      Pupils: Pupils are equal, round, and reactive to light.   Cardiovascular:      Pulses: Normal pulses.   Pulmonary:      Effort: Pulmonary effort is normal.   Abdominal:      Palpations: Abdomen is soft.   Musculoskeletal:      Cervical back: Normal range of motion.   Skin:     General: Skin is warm and dry.   Neurological:      Mental Status: He is alert and oriented to person, place, and time.   Psychiatric:         Mood and  Affect: Mood normal.         Behavior: Behavior normal.       Labs/Imaging  CMP:  Lab Results   Component Value Date    BUN 16 01/29/2025    CREATININE 0.86 01/29/2025    EGFR 101.6 01/29/2025    BCR 18.6 01/29/2025     01/29/2025    K 3.8 01/29/2025    CO2 27.3 01/29/2025    CALCIUM 9.2 01/29/2025    ALBUMIN 3.6 01/29/2025    AGRATIO 1.1 01/29/2025    BILITOT <0.2 01/29/2025    ALKPHOS 66 01/29/2025    AST 10 01/29/2025    ALT 8 01/29/2025     Lipid Panel:  Lab Results   Component Value Date    CHOL 115 01/29/2025    TRIG 122 01/29/2025    HDL 32 (L) 01/29/2025    VLDL 22 01/29/2025    LDL 61 01/29/2025     HbA1c:  Lab Results   Component Value Date    HGBA1C 9.80 (H) 12/26/2024    HGBA1C  12/16/2024      Comment:      Questionable results due to grossly lipemic specimen.    Dr. Gong in ER aware of lipemia interference. Dr. Gunn (hospitalist) aware of corrected report.  Corrected result. Previous result was 8.80 % on 12/16/2024 at 2231 EST.    HGBA1C 10.30 (H) 09/06/2023     Microalbumin:  Lab Results   Component Value Date    MALBCRERATIO  01/29/2025      Comment:      Unable to calculate     TSH:  Lab Results   Component Value Date    TSH 2.340 01/29/2025       Assessment and Plan  Diagnoses and all orders for this visit:    1. Type 2 diabetes mellitus with hyperglycemia, with long-term current use of insulin (Primary)  Assessment & Plan:  -Diabetes is not controlled with A1c 9.8 on 12/26/24, however it is improving with GMI of 8.6% today.   -CGM data reviewed from the last two weeks shows average glucose 219 with standard deviation of 42. In target range 19%, high 57%, very high 24%, low 0%, very low 0%.   -Pattern of fasting hyperglycemia with postprandial hyperglycemia  -Will send Olga 3 plus CGM  -Will increase Lantus to 30 units daily.  Instructed patient to increase dose by 2 units every 3 days for fasting blood sugars greater than 150.  He reports his fasting blood sugars are 130s to 180s right  now.  -Will increase metformin to 1000 mg extended release twice daily  -He is not currently interested in GLP-1 therapy due to potential weight loss.  He does not want to lose anymore weight.  -Discussed dietary and exercise guidelines with patient. 150 g carbs per day and 150 minutes of exercise per week. Increase protein with complex carbs. Avoid foods high in refined sugars and sugary drinks.   -Discussed the importance of yearly eye exams.  -Discussed Glucagon use and appropriate timing for this.   -S/S hypoglycemia reviewed with Rule of 15's advised.  -Discussed long term risks of untreated/undertreated diabetes including retinopathy, neuropathy, nephropathy, amputations, hospitalizations, MI, CVA.      Orders:  -     POC Glucose, Blood  -     metFORMIN ER (GLUCOPHAGE-XR) 500 MG 24 hr tablet; Take 2 tablets by mouth Daily With Breakfast & Dinner.  Dispense: 120 tablet; Refill: 3  -     Continuous Glucose Sensor (FreeStyle Olga 3 Plus Sensor); Use Every 15 (Fifteen) Days.  Dispense: 6 each; Refill: 2  -     Continuous Glucose  (FreeStyle Olga 3 [place]) device; Use 1 each Take As Directed.  Dispense: 1 each; Refill: 0      Return in about 8 weeks (around 4/9/2025). The patient was instructed to contact the clinic with any interval questions or concerns.    Please note that portions of this document were completed with a voice recognition program. Efforts were made to edit the dictations, but occasionally words are mis-transcribed.  This document has been electronically signed by CASPER Amaya  February 12, 2025 13:51 EST

## 2025-03-11 ENCOUNTER — OFFICE VISIT (OUTPATIENT)
Dept: GASTROENTEROLOGY | Facility: CLINIC | Age: 55
End: 2025-03-11
Payer: COMMERCIAL

## 2025-03-11 VITALS
WEIGHT: 192.8 LBS | SYSTOLIC BLOOD PRESSURE: 153 MMHG | BODY MASS INDEX: 26.11 KG/M2 | HEART RATE: 71 BPM | HEIGHT: 72 IN | DIASTOLIC BLOOD PRESSURE: 85 MMHG

## 2025-03-11 DIAGNOSIS — K21.9 GASTROESOPHAGEAL REFLUX DISEASE, UNSPECIFIED WHETHER ESOPHAGITIS PRESENT: ICD-10-CM

## 2025-03-11 DIAGNOSIS — K85.91: Primary | ICD-10-CM

## 2025-03-11 PROCEDURE — 99213 OFFICE O/P EST LOW 20 MIN: CPT

## 2025-03-11 NOTE — PROGRESS NOTES
Chief Complaint  RENAE Matson is a 54 y.o. male who presents today to [facility] for GERD.    HPI:   54-year-old male with PMH of uncontrolled DM2 and Bell's palsy who presents today for evaluation of recurrent pancreatitis.  Patient was initially evaluated in the ER on 12/17/2024 with acute upper abdominal pain.  Lab work was notable for triglycerides >4425 lipase >3000.  CT abdomen pelvis notable for acute pancreatitis.  Patient was presented to  for transfer for possible plasmapheresis given hypertriglyceridemic state.  However, he did not meet criteria.  He was subsequently treated with an insulin drip and D10 infusions.  A repeat CT abdomen pelvis performed on 12/20/2024 showed apparent suggestive of progression of pancreatitis with extensive peripancreatic stranding.  Pancreatic fluid, no abscess noted at that time, extensive thickening of the adjacent duodenum likely secondary from pancreatitis.  He was discharged on fenofibrate.  Patient was subsequently admitted to [facility].  Patient was discharged on 12/22/2024.  Unfortunately, discharge summary is incomplete and unavailable for review.  Patient Caro presented to the [facility] ED on 12/26/2024 with ongoing abdominal swelling and shortness of breath.  Patient was tachycardic and tachypneic on arrival.  CBC on presentation was notable for significant leukocytosis.  He was subsequently given Zosyn prophylactically.  ABG was notable for metabolic acidosis.  There was concern that patient was in euglycemic DKA.  CT chest was notable for esophageal wall thickening with fluid in the esophagus suggestive of esophagitis likely from reflux.  CT abdomen pelvis showed severe pancreatitis with necrosis of the tail of the pancreas as well as fluid extending along the retroperitoneum and peritoneum, dilation of the stomach with inflammation about the duodenum without gastric outlet  obstruction.  Patient was subsequently transferred to  for urgent GI evaluation.  On arrival to , patient was diagnosed with euglycemic DKA with positive urine ketones and serum beta hydroxybutyrate.  He was placed on DKA protocols.  His Jardiance was discontinued as this is contraindicated in the setting of pancreatitis thought to be contributing to euglycemic DKA.  PBS service was consulted and recommended no acute intervention for W ON.  He was given Zosyn due to concern for peripancreatic fluid collection.  Right upper quadrant was notable for biliary sludge and distended gallbladder.  Elective cholecystectomy was recommended as well.  He was referred to [facility].  Patient was discharged on fenofibrate, statin, and fish oil 2 g twice daily.  He was instructed to reduce intake of simple carbohydrates, reduce saturated fat and increase consumption of fish with high omega-3 fatty acids.  Of note, patient has follow-up appointment with  trauma surgery in February 2025 and with  gastroenterology in March 2025.     Today, he presents for follow-up of pancreatitis.  He notes that this is the third or fourth time he has had pancreatitis in the past 5 years.  He had never been on any statin or fenofibrate prior to this last hospitalization.  He notes that he had a 25 pound weight loss over the last 3 weeks secondary to a acute pancreatitis.  He is currently having early satiety and feels full very quickly.  He has frequent acid reflux flares as well.  Notes that he is having issues with constipation and is having 3 maybe 4 bowel movements per week.  He rates his stool as BSS 3-4.  He endorses excessive straining and poor evacuation of his colon.  He has not noticed any bloody or black stools.  He denies nausea, vomiting, weight loss, or any other complaints today.  He denies alcohol use.  Of note, patient has never had colorectal cancer screening.  He states that he had a Cologuard shipped to his house 1-2 years ago.   However, he never had this performed.  Patient was offered EGD on last visit.  However, he wanted to defer.  He was initiated on Protonix 40 mg once daily.           Interval History:    Today, patient presents for follow-up.  He states that he has been doing well on Protonix 40 mg once daily.  He has not had any more abdominal pain or GERD since initiating.  He has not had any more issues with pancreatitis.  His weight has remained stable.  He reports having 1 bowel movement per day that he rates as BSS 3-5.  He had a Cologuard performed in February 2025 that was negative.  He has no other complaints today.    Previous History:   Past Medical History:   Diagnosis Date    Anxiety     Hypertension     Pancreatitis     Type 2 diabetes mellitus       History reviewed. No pertinent surgical history.   Social History     Socioeconomic History    Marital status:    Tobacco Use    Smoking status: Never     Passive exposure: Never    Smokeless tobacco: Current     Types: Snuff   Vaping Use    Vaping status: Never Used   Substance and Sexual Activity    Alcohol use: Never    Drug use: Never    Sexual activity: Yes        Current Medications:  Current Outpatient Medications   Medication Sig Dispense Refill    atorvastatin (LIPITOR) 40 MG tablet Take 1 tablet by mouth Daily.      Continuous Glucose  (FreeStyle Olga 3 [place]) device Use 1 each Take As Directed. 1 each 0    Continuous Glucose Sensor (FreeStyle Olga 3 Plus Sensor) Use Every 15 (Fifteen) Days. 6 each 2    fenofibrate (TRICOR) 145 MG tablet Take 1 tablet by mouth Daily.      Insulin Glargine (Lantus SoloStar) 100 UNIT/ML injection pen Inject 35 Units under the skin into the appropriate area as directed Every Night. 15 mL 2    Insulin Pen Needle 32G X 4 MM misc Use 1 each Daily. 100 each 3    metFORMIN ER (GLUCOPHAGE-XR) 500 MG 24 hr tablet Take 2 tablets by mouth Daily With Breakfast & Dinner. 120 tablet 3    Omega-3 Fatty Acids (fish oil) 1000 MG  "capsule capsule Take 1 capsule by mouth 2 (Two) Times a Day With Meals.      pantoprazole (Protonix) 40 MG EC tablet Take 1 tablet by mouth Daily. 60 tablet 5    metoprolol tartrate (LOPRESSOR) 25 MG tablet Take 0.5 tablets by mouth Every 12 (Twelve) Hours for 30 days. (Patient taking differently: Take 1 tablet by mouth Daily.) 30 tablet 0     No current facility-administered medications for this visit.       Allergies:   Allergies   Allergen Reactions    Empagliflozin Other (See Comments)     Diabetic Ketoacidosis       Vitals:   /85   Pulse 71   Ht 182.9 cm (72\")   Wt 87.5 kg (192 lb 12.8 oz)   BMI 26.15 kg/m²   Estimated body mass index is 26.15 kg/m² as calculated from the following:    Height as of this encounter: 182.9 cm (72\").    Weight as of this encounter: 87.5 kg (192 lb 12.8 oz).    ROS:   Review of Systems   Constitutional: Negative.    HENT: Negative.     Respiratory: Negative.     Cardiovascular: Negative.    Gastrointestinal: Negative.    All other systems reviewed and are negative.       Physical Exam:   Physical Exam  Vitals reviewed.   Constitutional:       General: He is not in acute distress.     Appearance: Normal appearance. He is well-groomed. He is not toxic-appearing.   HENT:      Head: Normocephalic and atraumatic.      Mouth/Throat:      Mouth: Mucous membranes are moist.   Eyes:      Extraocular Movements: Extraocular movements intact.   Cardiovascular:      Rate and Rhythm: Normal rate and regular rhythm.      Heart sounds: Normal heart sounds. No murmur heard.  Pulmonary:      Effort: Pulmonary effort is normal. No respiratory distress.      Breath sounds: Normal breath sounds. No stridor. No wheezing or rales.   Abdominal:      General: Bowel sounds are normal. There is no distension.      Palpations: Abdomen is soft. There is no mass.      Tenderness: There is no abdominal tenderness. There is no guarding or rebound.      Hernia: No hernia is present.   Skin:     General: " Skin is warm.      Coloration: Skin is not jaundiced.      Findings: No rash.   Neurological:      Mental Status: He is alert and oriented to person, place, and time.   Psychiatric:         Mood and Affect: Mood and affect normal.         Speech: Speech normal.         Behavior: Behavior normal. Behavior is cooperative.         Thought Content: Thought content normal.          Lab Results:   Lab Results   Component Value Date    WBC 7.15 12/30/2024    HGB 10.1 (L) 12/30/2024    HCT 30.5 (L) 12/30/2024    MCV 91 12/30/2024    RDW 12.9 12/30/2024     12/30/2024    NEUTRORELPCT 82.5 (H) 12/17/2024    LYMPHORELPCT 8.7 (L) 12/17/2024    MONORELPCT 7.2 12/17/2024    EOSRELPCT 0.4 12/17/2024    BASORELPCT 0.6 12/17/2024    NEUTROABS 43.16 (H) 12/26/2024    LYMPHSABS 0.92 12/17/2024       Lab Results   Component Value Date     01/29/2025    K 3.8 01/29/2025    CO2 27.3 01/29/2025     01/29/2025    BUN 16 01/29/2025    CREATININE 0.86 01/29/2025    GLUCOSE 253 (H) 01/29/2025    CALCIUM 9.2 01/29/2025    ALKPHOS 66 01/29/2025    AST 10 01/29/2025    ALT 8 01/29/2025    BILITOT <0.2 01/29/2025    ALBUMIN 3.6 01/29/2025    PROTEINTOT 6.9 01/29/2025    MG 1.9 12/30/2024    PHOS 4.1 12/26/2024       Pathology:        Endoscopy:        Imaging:   FL Upper GI Double Contrast & SBFT  Result Date: 1/22/2025  1.  Complete small bowel transit noted at 2 hours. 2.  Very small sliding hiatal hernia. 3.  Occasional episodes of gastroesophageal reflux to level of mid-upper thoracic esophagus. 4. Otherwise unremarkable upper GI series and small bowel follow-through.  This report was finalized on 1/22/2025 12:19 PM by Dr. Franklin Butterfield MD.      US Abdomen Limited  Result Date: 12/28/2024  Gallbladder sludge within mildly distended gallbladder. Pericholecystic fluid. No biliary ductal dilatation. CRITICAL RESULT: No. COMMUNICATION: Per this written report. Drafted by Anamaria Espinal MD on 12/28/2024 11:22 PM Final report  signed by Anamaria Espinal MD on 12/28/2024 11:24 PM    XR Abdomen 1 View  Result Date: 12/26/2024  No significant stool burden. Mild colonic and small bowel distention may represent ileus. CRITICAL RESULT:   No. COMMUNICATION: Per this written report. Drafted by Anamaria Espinal MD on 12/26/2024 9:04 PM Final report signed by Anamaria Espinal MD on 12/26/2024 9:06 PM    CT Chest With Contrast Diagnostic  Result Date: 12/26/2024   CT CHEST: ESOPHAGEAL WALL THICKENING WITH FLUID IN THE ESOPHAGEAL LUMEN.  FINDINGS SUGGEST ESOPHAGITIS, POSSIBLY FROM REFLUX.  CT ABDOMEN AND PELVIS: SEVERE PANCREATITIS WITH NECROSIS IN THE PANCREATIC TAIL AND FLUID EXTENDING ALONG THE RETROPERITONEUM, SIMILAR COMPARED TO 12/20/2024.  DILATION OF THE STOMACH WITH INFLAMMATION ABOUT THE DUODENUM.  NO GASTRIC OUTLET OBSTRUCTION.   This report was finalized on 12/26/2024 6:11 AM by Fany Faria MD.      CT Abdomen Pelvis With Contrast  Result Date: 12/26/2024   CT CHEST: ESOPHAGEAL WALL THICKENING WITH FLUID IN THE ESOPHAGEAL LUMEN.  FINDINGS SUGGEST ESOPHAGITIS, POSSIBLY FROM REFLUX.  CT ABDOMEN AND PELVIS: SEVERE PANCREATITIS WITH NECROSIS IN THE PANCREATIC TAIL AND FLUID EXTENDING ALONG THE RETROPERITONEUM, SIMILAR COMPARED TO 12/20/2024.  DILATION OF THE STOMACH WITH INFLAMMATION ABOUT THE DUODENUM.  NO GASTRIC OUTLET OBSTRUCTION.   This report was finalized on 12/26/2024 6:11 AM by Fany Faria MD.      XR Chest 1 View  Result Date: 12/26/2024   Hypoinflated lungs. No lobar consolidation or edema. No pleural effusion or pneumothorax. No fracture or dislocation.   This report was finalized on 12/26/2024 12:22 AM by Mukund Brennan MD.      CT Abdomen Pelvis With Contrast  Result Date: 12/20/2024   1. Appearance suggestive of progression of pancreatitis with extensive peripancreatic stranding and peripancreatic fluid. No abscess at this time  2. Extensive thickening of the adjacent duodenum, presumably from the pancreatitis         This report was  finalized on 12/20/2024 11:23 AM by Dr. Braden Landin MD.      CT Abdomen Pelvis With Contrast  Result Date: 12/16/2024   Acute pancreatitis.  This report was finalized on 12/16/2024 9:17 PM by Leonard Morales MD.      XR Chest 1 View  Result Date: 12/16/2024   No evidence of acute disease in the chest.  This report was finalized on 12/16/2024 9:07 PM by Leonard Morales MD.          Results review: During today's encounter, all relevant clinical data has been reviewed.      Assessment and Plan    1. Pancreatitis with uninfected necrosis (Primary)  Patient reports that he has not had any more recent episodes.  He is following with endocrinology.    2. Gastroesophageal reflux disease, unspecified whether esophagitis present  Well-controlled on Protonix 40 mg daily.  Please continue.      New Medications:   No orders of the defined types were placed in this encounter.      Discontinued Medications:   There are no discontinued medications.     Visit Diagnoses:    ICD-10-CM ICD-9-CM   1. Pancreatitis with uninfected necrosis  K85.91 577.0   2. Gastroesophageal reflux disease, unspecified whether esophagitis present  K21.9 530.81            Follow Up:   Return in about 6 months (around 9/11/2025).    The patient was in agreement with the plan and all questions were answered to patient's satisfaction.        This document has been electronically signed by Lavinia Carrington PA-C   March 11, 2025 08:51 EDT    Dictated Utilizing Dragon Dictation: Part of this note may be an electronic transcription/translation of spoken language to printed text using the Dragon Dictation System.    CC:  No ref. provider found  Zain April, APRN

## 2025-04-08 ENCOUNTER — OFFICE VISIT (OUTPATIENT)
Dept: ENDOCRINOLOGY | Facility: CLINIC | Age: 55
End: 2025-04-08
Payer: COMMERCIAL

## 2025-04-08 VITALS
BODY MASS INDEX: 26.12 KG/M2 | WEIGHT: 192.6 LBS | HEART RATE: 62 BPM | OXYGEN SATURATION: 99 % | DIASTOLIC BLOOD PRESSURE: 78 MMHG | SYSTOLIC BLOOD PRESSURE: 140 MMHG

## 2025-04-08 DIAGNOSIS — E11.65 TYPE 2 DIABETES MELLITUS WITH HYPERGLYCEMIA, WITH LONG-TERM CURRENT USE OF INSULIN: Primary | ICD-10-CM

## 2025-04-08 DIAGNOSIS — Z79.4 TYPE 2 DIABETES MELLITUS WITH HYPERGLYCEMIA, WITH LONG-TERM CURRENT USE OF INSULIN: Primary | ICD-10-CM

## 2025-04-08 LAB
EXPIRATION DATE: ABNORMAL
HBA1C MFR BLD: 8.4 % (ref 4.5–5.7)
Lab: ABNORMAL

## 2025-04-08 RX ORDER — GLIPIZIDE 5 MG/1
5 TABLET, FILM COATED, EXTENDED RELEASE ORAL DAILY
Qty: 90 TABLET | Refills: 0 | Status: SHIPPED | OUTPATIENT
Start: 2025-04-08

## 2025-04-08 RX ORDER — INSULIN GLARGINE 100 [IU]/ML
38 INJECTION, SOLUTION SUBCUTANEOUS NIGHTLY
Qty: 15 ML | Refills: 2 | Status: SHIPPED | OUTPATIENT
Start: 2025-04-08

## 2025-04-08 NOTE — PROGRESS NOTES
Chief Complaint   Patient presents with    Follow-up     Type 2 diabetes mellitus with hyperglycemia, with long-term current use of insulin       HPI   Herminio Matson is a 54 y.o. male who presents to the clinic today for follow-up of type 2 diabetes.  Previous workup for type 1 diabetes was negative at last office visit.  Antibodies and C-peptide were normal.  Diabetes was diagnosed at least 10 years ago.  A1c today is 8.4. He currently checks his glucose with Olga 3 CGM. Glucose is averaging 200s.  He is currently taking Lantus 35 units daily and metformin 1000 mg extended release twice daily.  He is not interested in GLP-1 injections due to potential weight loss.  He denies any hypoglycemia.    Past medications: Jardiance- DKA, Farxiga- cost issues, glipizide- unsure why it was stopped  Diabetic complications: none  Last optho exam: due and advised  Last microalbumin: UTD  Last foot exam: UTD  Statin: yes   Lipid panel: UTD  ACE/ARB: no     The following portions of the patient's history were reviewed and updated as appropriate: allergies, current medications, past family history, past medical history, past social history, past surgical history, and problem list.    /78 (BP Location: Left arm, Patient Position: Sitting, Cuff Size: Adult)   Pulse 62   Wt 87.4 kg (192 lb 9.6 oz)   SpO2 99%   BMI 26.12 kg/m²    Past Medical History:   Diagnosis Date    Anxiety     Hypertension     Pancreatitis     Type 2 diabetes mellitus      History reviewed. No pertinent surgical history.   Family History   Problem Relation Age of Onset    Diabetes Father       Social History     Socioeconomic History    Marital status:    Tobacco Use    Smoking status: Never     Passive exposure: Never    Smokeless tobacco: Current     Types: Snuff   Vaping Use    Vaping status: Never Used   Substance and Sexual Activity    Alcohol use: Never    Drug use: Never    Sexual activity: Yes      Allergies   Allergen Reactions     Empagliflozin Other (See Comments)     Diabetic Ketoacidosis      Current Outpatient Medications on File Prior to Visit   Medication Sig Dispense Refill    atorvastatin (LIPITOR) 40 MG tablet Take 1 tablet by mouth Daily.      Continuous Glucose  (FreeStyle Olga 3 [place]) device Use 1 each Take As Directed. 1 each 0    Continuous Glucose Sensor (FreeStyle Olga 3 Plus Sensor) Use Every 15 (Fifteen) Days. 6 each 2    fenofibrate (TRICOR) 145 MG tablet Take 1 tablet by mouth Daily.      Insulin Pen Needle 32G X 4 MM misc Use 1 each Daily. 100 each 3    metFORMIN ER (GLUCOPHAGE-XR) 500 MG 24 hr tablet Take 2 tablets by mouth Daily With Breakfast & Dinner. 120 tablet 3    Omega-3 Fatty Acids (fish oil) 1000 MG capsule capsule Take 1 capsule by mouth 2 (Two) Times a Day With Meals.      pantoprazole (Protonix) 40 MG EC tablet Take 1 tablet by mouth Daily. 60 tablet 5    metoprolol tartrate (LOPRESSOR) 25 MG tablet Take 0.5 tablets by mouth Every 12 (Twelve) Hours for 30 days. (Patient taking differently: Take 1 tablet by mouth Daily.) 30 tablet 0     No current facility-administered medications on file prior to visit.      Review of Systems   Constitutional:  Positive for fatigue and unexpected weight loss.   Endocrine: Positive for polydipsia, polyphagia and polyuria.   Psychiatric/Behavioral:  Positive for sleep disturbance.       Physical Exam  Vitals reviewed.   Constitutional:       Appearance: Normal appearance.   HENT:      Head: Normocephalic.   Eyes:      Pupils: Pupils are equal, round, and reactive to light.   Cardiovascular:      Pulses: Normal pulses.   Pulmonary:      Effort: Pulmonary effort is normal.   Abdominal:      Palpations: Abdomen is soft.   Musculoskeletal:      Cervical back: Normal range of motion.   Skin:     General: Skin is warm and dry.   Neurological:      Mental Status: He is alert and oriented to person, place, and time.   Psychiatric:         Mood and Affect: Mood normal.          Behavior: Behavior normal.       Labs/Imaging  CMP:  Lab Results   Component Value Date    BUN 16 01/29/2025    CREATININE 0.86 01/29/2025    EGFR 101.6 01/29/2025    BCR 18.6 01/29/2025     01/29/2025    K 3.8 01/29/2025    CO2 27.3 01/29/2025    CALCIUM 9.2 01/29/2025    ALBUMIN 3.6 01/29/2025    AGRATIO 1.1 01/29/2025    BILITOT <0.2 01/29/2025    ALKPHOS 66 01/29/2025    AST 10 01/29/2025    ALT 8 01/29/2025     Lipid Panel:  Lab Results   Component Value Date    CHOL 115 01/29/2025    TRIG 122 01/29/2025    HDL 32 (L) 01/29/2025    VLDL 22 01/29/2025    LDL 61 01/29/2025     HbA1c:  Lab Results   Component Value Date    HGBA1C 8.4 (A) 04/08/2025    HGBA1C 9.80 (H) 12/26/2024    HGBA1C  12/16/2024      Comment:      Questionable results due to grossly lipemic specimen.    Dr. Gong in ER aware of lipemia interference. Dr. Gunn (hospitalist) aware of corrected report.  Corrected result. Previous result was 8.80 % on 12/16/2024 at 2231 EST.     Microalbumin:  Lab Results   Component Value Date    MALBCRERATIO  01/29/2025      Comment:      Unable to calculate     TSH:  Lab Results   Component Value Date    TSH 2.340 01/29/2025       Assessment and Plan  Diagnoses and all orders for this visit:    1. Type 2 diabetes mellitus with hyperglycemia, with long-term current use of insulin (Primary)  Assessment & Plan:  -Diabetes is not controlled with A1c 8.4.   -CGM data reviewed from the last two weeks shows average glucose 208 with standard deviation of 22%. In target range 31%, high 53%, very high 16%, low 0%, very low 0%.  -Pattern of: Some fasting hyperglycemia with postprandial hyperglycemia  -Increase Lantus to 38 units daily  -Start glipizide 5 mg extended release daily  -Continue metformin 1000 mg extended release twice daily  -Will consider Januvia if additional medication needs to be added at next office visit to improve blood glucose. Co-pay card should make this affordable for patient.  -Discussed  dietary and exercise guidelines with patient. 150 g carbs per day and 150 minutes of exercise per week. Increase protein with complex carbs. Avoid foods high in refined sugars and sugary drinks.   -Discussed the importance of yearly eye exams.  -Discussed Glucagon use and appropriate timing for this.   -S/S hypoglycemia reviewed with Rule of 15's advised.  -Discussed long term risks of untreated/undertreated diabetes including retinopathy, neuropathy, nephropathy, amputations, hospitalizations, MI, CVA.      Orders:  -     POC Glycosylated Hemoglobin (Hb A1C)  -     Insulin Glargine (Lantus SoloStar) 100 UNIT/ML injection pen; Inject 38 Units under the skin into the appropriate area as directed Every Night.  Dispense: 15 mL; Refill: 2  -     glipizide (GLUCOTROL XL) 5 MG ER tablet; Take 1 tablet by mouth Daily.  Dispense: 90 tablet; Refill: 0      Return in about 3 months (around 7/8/2025). The patient was instructed to contact the clinic with any interval questions or concerns.    Please note that portions of this document were completed with a voice recognition program. Efforts were made to edit the dictations, but occasionally words are mis-transcribed.  This document has been electronically signed by CASPER Amaya  April 10, 2025 08:38 EDT

## 2025-04-10 NOTE — ASSESSMENT & PLAN NOTE
-Diabetes is not controlled with A1c 8.4.   -CGM data reviewed from the last two weeks shows average glucose 208 with standard deviation of 22%. In target range 31%, high 53%, very high 16%, low 0%, very low 0%.  -Pattern of: Some fasting hyperglycemia with postprandial hyperglycemia  -Increase Lantus to 38 units daily  -Start glipizide 5 mg extended release daily  -Continue metformin 1000 mg extended release twice daily  -Will consider Januvia if additional medication needs to be added at next office visit to improve blood glucose. Co-pay card should make this affordable for patient.  -Discussed dietary and exercise guidelines with patient. 150 g carbs per day and 150 minutes of exercise per week. Increase protein with complex carbs. Avoid foods high in refined sugars and sugary drinks.   -Discussed the importance of yearly eye exams.  -Discussed Glucagon use and appropriate timing for this.   -S/S hypoglycemia reviewed with Rule of 15's advised.  -Discussed long term risks of untreated/undertreated diabetes including retinopathy, neuropathy, nephropathy, amputations, hospitalizations, MI, CVA.

## 2025-06-30 DIAGNOSIS — E11.65 TYPE 2 DIABETES MELLITUS WITH HYPERGLYCEMIA, WITH LONG-TERM CURRENT USE OF INSULIN: ICD-10-CM

## 2025-06-30 DIAGNOSIS — Z79.4 TYPE 2 DIABETES MELLITUS WITH HYPERGLYCEMIA, WITH LONG-TERM CURRENT USE OF INSULIN: ICD-10-CM

## 2025-06-30 RX ORDER — METFORMIN HYDROCHLORIDE 500 MG/1
1000 TABLET, EXTENDED RELEASE ORAL
Qty: 120 TABLET | Refills: 3 | Status: SHIPPED | OUTPATIENT
Start: 2025-06-30

## 2025-06-30 RX ORDER — INSULIN GLARGINE 100 [IU]/ML
38 INJECTION, SOLUTION SUBCUTANEOUS NIGHTLY
Qty: 15 ML | Refills: 2 | Status: SHIPPED | OUTPATIENT
Start: 2025-06-30

## 2025-06-30 RX ORDER — GLIPIZIDE 5 MG/1
5 TABLET, FILM COATED, EXTENDED RELEASE ORAL DAILY
Qty: 90 TABLET | Refills: 0 | Status: SHIPPED | OUTPATIENT
Start: 2025-06-30

## 2025-07-08 ENCOUNTER — OFFICE VISIT (OUTPATIENT)
Dept: ENDOCRINOLOGY | Facility: CLINIC | Age: 55
End: 2025-07-08
Payer: COMMERCIAL

## 2025-07-08 VITALS
BODY MASS INDEX: 26.31 KG/M2 | HEART RATE: 73 BPM | OXYGEN SATURATION: 97 % | DIASTOLIC BLOOD PRESSURE: 74 MMHG | WEIGHT: 194 LBS | SYSTOLIC BLOOD PRESSURE: 140 MMHG

## 2025-07-08 DIAGNOSIS — I10 PRIMARY HYPERTENSION: ICD-10-CM

## 2025-07-08 DIAGNOSIS — Z79.4 TYPE 2 DIABETES MELLITUS WITH HYPERGLYCEMIA, WITH LONG-TERM CURRENT USE OF INSULIN: Primary | ICD-10-CM

## 2025-07-08 DIAGNOSIS — E78.2 MIXED HYPERLIPIDEMIA: ICD-10-CM

## 2025-07-08 DIAGNOSIS — E11.65 TYPE 2 DIABETES MELLITUS WITH HYPERGLYCEMIA, WITH LONG-TERM CURRENT USE OF INSULIN: Primary | ICD-10-CM

## 2025-07-08 LAB
CHOLEST SERPL-MCNC: 137 MG/DL (ref 0–200)
EXPIRATION DATE: ABNORMAL
HBA1C MFR BLD: 7.2 % (ref 4.5–5.7)
HDLC SERPL-MCNC: 34 MG/DL (ref 40–60)
LDLC SERPL CALC-MCNC: 59 MG/DL (ref 0–100)
LDLC/HDLC SERPL: 1.41 {RATIO}
Lab: ABNORMAL
TRIGL SERPL-MCNC: 275 MG/DL (ref 0–150)
VLDLC SERPL-MCNC: 44 MG/DL (ref 5–40)

## 2025-07-08 PROCEDURE — 99214 OFFICE O/P EST MOD 30 MIN: CPT

## 2025-07-08 PROCEDURE — 95251 CONT GLUC MNTR ANALYSIS I&R: CPT

## 2025-07-08 PROCEDURE — 80061 LIPID PANEL: CPT

## 2025-07-08 PROCEDURE — 83036 HEMOGLOBIN GLYCOSYLATED A1C: CPT

## 2025-07-08 RX ORDER — HYDROCHLOROTHIAZIDE 12.5 MG/1
CAPSULE ORAL
Qty: 6 EACH | Refills: 2 | Status: SHIPPED | OUTPATIENT
Start: 2025-07-08

## 2025-07-08 RX ORDER — ATORVASTATIN CALCIUM 40 MG/1
40 TABLET, FILM COATED ORAL DAILY
Qty: 30 TABLET | Refills: 2 | Status: SHIPPED | OUTPATIENT
Start: 2025-07-08

## 2025-07-08 RX ORDER — FENOFIBRATE 145 MG/1
145 TABLET, FILM COATED ORAL DAILY
Qty: 30 TABLET | Refills: 2 | Status: SHIPPED | OUTPATIENT
Start: 2025-07-08

## 2025-07-08 RX ORDER — METOPROLOL TARTRATE 25 MG/1
25 TABLET, FILM COATED ORAL DAILY
Qty: 30 TABLET | Refills: 2 | Status: SHIPPED | OUTPATIENT
Start: 2025-07-08

## 2025-07-08 NOTE — PROGRESS NOTES
Chief Complaint   Patient presents with    Diabetes     F/u       HPI  Herminio Matson is a 54 y.o. male who presents to the clinic today for follow-up of type 2 diabetes.  Previous workup for type 1 diabetes was negative at last office visit.  Antibodies and C-peptide were normal.  Diabetes was diagnosed at least 10 years ago.  A1c today is 7.2.  He checks his glucose with tania 2+ CGM.  Glucose is averaging 170s.  He is currently taking Lantus 38 units daily, glipizide 5 mg extended release daily and metformin 1000 mg extended release twice daily.  Overall he is doing well. He denies any hypoglycemia.     History of pancreatitis x3, last episode was 1/2025  Past medications: Jardiance- DKA, Farxiga- cost issues, glipizide- unsure why it was stopped  Diabetic complications: none  Last optho exam: due and advised  Last microalbumin: UTD  Last foot exam: UTD  Statin: yes   Lipid panel: UTD  ACE/ARB: no     The following portions of the patient's history were reviewed and updated as appropriate: allergies, current medications, past family history, past medical history, past social history, past surgical history, and problem list.    /74 (BP Location: Left arm, Patient Position: Sitting, Cuff Size: Adult)   Pulse 73   Wt 88 kg (194 lb)   SpO2 97%   BMI 26.31 kg/m²    Past Medical History:   Diagnosis Date    Anxiety     Hypertension     Pancreatitis     Type 2 diabetes mellitus      History reviewed. No pertinent surgical history.   Family History   Problem Relation Age of Onset    Diabetes Father       Social History     Socioeconomic History    Marital status:    Tobacco Use    Smoking status: Never     Passive exposure: Never    Smokeless tobacco: Current     Types: Snuff   Vaping Use    Vaping status: Never Used   Substance and Sexual Activity    Alcohol use: Never    Drug use: Never    Sexual activity: Yes      Allergies   Allergen Reactions    Empagliflozin Other (See Comments)     Diabetic  Ketoacidosis      Current Outpatient Medications on File Prior to Visit   Medication Sig Dispense Refill    Continuous Glucose  (FreeStyle Olga 3 [place]) device Use 1 each Take As Directed. 1 each 0    Insulin Glargine (Lantus SoloStar) 100 UNIT/ML injection pen Inject 38 Units under the skin into the appropriate area as directed Every Night. 15 mL 2    Insulin Pen Needle 32G X 4 MM misc Use 1 each Daily. 100 each 3    metFORMIN ER (GLUCOPHAGE-XR) 500 MG 24 hr tablet Take 2 tablets by mouth Daily With Breakfast & Dinner. 120 tablet 3    Omega-3 Fatty Acids (fish oil) 1000 MG capsule capsule Take 1 capsule by mouth 2 (Two) Times a Day With Meals.      pantoprazole (Protonix) 40 MG EC tablet Take 1 tablet by mouth Daily. 60 tablet 5    [DISCONTINUED] atorvastatin (LIPITOR) 40 MG tablet Take 1 tablet by mouth Daily.      [DISCONTINUED] Continuous Glucose Sensor (FreeStyle Olga 3 Plus Sensor) Use Every 15 (Fifteen) Days. 6 each 2    [DISCONTINUED] fenofibrate (TRICOR) 145 MG tablet Take 1 tablet by mouth Daily.      [DISCONTINUED] glipizide (GLUCOTROL XL) 5 MG ER tablet Take 1 tablet by mouth Daily. 90 tablet 0    [DISCONTINUED] metoprolol tartrate (LOPRESSOR) 25 MG tablet Take 0.5 tablets by mouth Every 12 (Twelve) Hours for 30 days. (Patient taking differently: Take 1 tablet by mouth Daily.) 30 tablet 0     No current facility-administered medications on file prior to visit.      Review of Systems   Constitutional:  Positive for fatigue. Negative for unexpected weight loss.   Endocrine: Negative for polydipsia, polyphagia and polyuria.   Psychiatric/Behavioral:  Positive for sleep disturbance.       Physical Exam  Vitals reviewed.   Constitutional:       Appearance: Normal appearance.   HENT:      Head: Normocephalic.   Eyes:      Pupils: Pupils are equal, round, and reactive to light.   Cardiovascular:      Pulses: Normal pulses.   Pulmonary:      Effort: Pulmonary effort is normal.   Abdominal:       Palpations: Abdomen is soft.   Musculoskeletal:      Cervical back: Normal range of motion.   Skin:     General: Skin is warm and dry.   Neurological:      Mental Status: He is alert and oriented to person, place, and time.   Psychiatric:         Mood and Affect: Mood normal.         Behavior: Behavior normal.       Labs/Imaging  CMP:  Lab Results   Component Value Date    BUN 16 01/29/2025    CREATININE 0.86 01/29/2025    EGFR 101.6 01/29/2025    BCR 18.6 01/29/2025     01/29/2025    K 3.8 01/29/2025    CO2 27.3 01/29/2025    CALCIUM 9.2 01/29/2025    ALBUMIN 3.6 01/29/2025    AGRATIO 1.1 01/29/2025    BILITOT <0.2 01/29/2025    ALKPHOS 66 01/29/2025    AST 10 01/29/2025    ALT 8 01/29/2025     Lipid Panel:  Lab Results   Component Value Date    CHOL 115 01/29/2025    TRIG 122 01/29/2025    HDL 32 (L) 01/29/2025    VLDL 22 01/29/2025    LDL 61 01/29/2025     HbA1c:  Lab Results   Component Value Date    HGBA1C 7.2 (A) 07/08/2025    HGBA1C 8.4 (A) 04/08/2025    HGBA1C 9.80 (H) 12/26/2024     Microalbumin:  Lab Results   Component Value Date    MALBCRERATIO  01/29/2025      Comment:      Unable to calculate     TSH:  Lab Results   Component Value Date    TSH 2.340 01/29/2025       Assessment and Plan  Diagnoses and all orders for this visit:    1. Type 2 diabetes mellitus with hyperglycemia, with long-term current use of insulin (Primary)  Assessment & Plan:  -Diabetes is improving with A1c 7.2.   -CGM data reviewed from the last two weeks shows average glucose 177 with glucose variability of 23.6%. In target range 56%, high 38%, very high 6%, low 0%, very low 0%.   -Pattern of: Some postprandial hyperglycemia  -Continue Lantus 38 units daily  -Continue glipizide 5 mg extended release daily  -Continue metformin 1,000 mg extended release daily  -Discussed dietary and exercise guidelines with patient. 150 g carbs per day and 150 minutes of exercise per week. Increase protein with complex carbs. Avoid foods high  in refined sugars and sugary drinks.   -Discussed the importance of yearly eye exams.  -Discussed Glucagon use and appropriate timing for this.   -S/S hypoglycemia reviewed with Rule of 15's advised.  -Discussed long term risks of untreated/undertreated diabetes including retinopathy, neuropathy, nephropathy, amputations, hospitalizations, MI, CVA.      Orders:  -     POC Glycosylated Hemoglobin (Hb A1C)  -     Continuous Glucose Sensor (FreeStyle Olga 3 Plus Sensor); Use Every 15 (Fifteen) Days.  Dispense: 6 each; Refill: 2    2. Primary hypertension  -     metoprolol tartrate (LOPRESSOR) 25 MG tablet; Take 1 tablet by mouth Daily.  Dispense: 30 tablet; Refill: 2    3. Mixed hyperlipidemia  -     fenofibrate (TRICOR) 145 MG tablet; Take 1 tablet by mouth Daily.  Dispense: 30 tablet; Refill: 2  -     atorvastatin (LIPITOR) 40 MG tablet; Take 1 tablet by mouth Daily.  Dispense: 30 tablet; Refill: 2  -     Lipid Panel      Return in about 3 months (around 10/8/2025). The patient was instructed to contact the clinic with any interval questions or concerns.    Please note that portions of this document were completed with a voice recognition program. Efforts were made to edit the dictations, but occasionally words are mis-transcribed.  This document has been electronically signed by CASPER Amaya  July 8, 2025 16:43 EDT

## 2025-07-08 NOTE — ASSESSMENT & PLAN NOTE
-Diabetes is improving with A1c 7.2.   -CGM data reviewed from the last two weeks shows average glucose 177 with glucose variability of 23.6%. In target range 56%, high 38%, very high 6%, low 0%, very low 0%.   -Pattern of: Some postprandial hyperglycemia  -Continue Lantus 38 units daily  -Continue glipizide 5 mg extended release daily  -Continue metformin 1,000 mg extended release daily  -Discussed dietary and exercise guidelines with patient. 150 g carbs per day and 150 minutes of exercise per week. Increase protein with complex carbs. Avoid foods high in refined sugars and sugary drinks.   -Discussed the importance of yearly eye exams.  -Discussed Glucagon use and appropriate timing for this.   -S/S hypoglycemia reviewed with Rule of 15's advised.  -Discussed long term risks of untreated/undertreated diabetes including retinopathy, neuropathy, nephropathy, amputations, hospitalizations, MI, CVA.